# Patient Record
Sex: MALE | Race: WHITE | NOT HISPANIC OR LATINO | Employment: OTHER | URBAN - METROPOLITAN AREA
[De-identification: names, ages, dates, MRNs, and addresses within clinical notes are randomized per-mention and may not be internally consistent; named-entity substitution may affect disease eponyms.]

---

## 2018-10-11 ENCOUNTER — HOSPITAL ENCOUNTER (OUTPATIENT)
Dept: LAB | Facility: MEDICAL CENTER | Age: 83
End: 2018-10-11
Attending: FAMILY MEDICINE
Payer: MEDICARE

## 2018-10-11 LAB
ALBUMIN SERPL BCP-MCNC: 4.4 G/DL (ref 3.2–4.9)
ALBUMIN/GLOB SERPL: 1.9 G/DL
ALP SERPL-CCNC: 59 U/L (ref 30–99)
ALT SERPL-CCNC: 23 U/L (ref 2–50)
ANION GAP SERPL CALC-SCNC: 6 MMOL/L (ref 0–11.9)
APPEARANCE UR: CLEAR
AST SERPL-CCNC: 27 U/L (ref 12–45)
BACTERIA #/AREA URNS HPF: NEGATIVE /HPF
BASOPHILS # BLD AUTO: 0.5 % (ref 0–1.8)
BASOPHILS # BLD: 0.03 K/UL (ref 0–0.12)
BILIRUB SERPL-MCNC: 2.1 MG/DL (ref 0.1–1.5)
BILIRUB UR QL STRIP.AUTO: NEGATIVE
BNP SERPL-MCNC: 143 PG/ML (ref 0–100)
BUN SERPL-MCNC: 20 MG/DL (ref 8–22)
CALCIUM SERPL-MCNC: 9.2 MG/DL (ref 8.5–10.5)
CHLORIDE SERPL-SCNC: 101 MMOL/L (ref 96–112)
CHOLEST SERPL-MCNC: 148 MG/DL (ref 100–199)
CO2 SERPL-SCNC: 31 MMOL/L (ref 20–33)
COLOR UR: YELLOW
CREAT SERPL-MCNC: 0.96 MG/DL (ref 0.5–1.4)
EOSINOPHIL # BLD AUTO: 0.1 K/UL (ref 0–0.51)
EOSINOPHIL NFR BLD: 1.5 % (ref 0–6.9)
EPI CELLS #/AREA URNS HPF: NEGATIVE /HPF
ERYTHROCYTE [DISTWIDTH] IN BLOOD BY AUTOMATED COUNT: 49.1 FL (ref 35.9–50)
FASTING STATUS PATIENT QL REPORTED: NORMAL
GLOBULIN SER CALC-MCNC: 2.3 G/DL (ref 1.9–3.5)
GLUCOSE SERPL-MCNC: 115 MG/DL (ref 65–99)
GLUCOSE UR STRIP.AUTO-MCNC: NEGATIVE MG/DL
HCT VFR BLD AUTO: 45 % (ref 42–52)
HDLC SERPL-MCNC: 84 MG/DL
HGB BLD-MCNC: 14.6 G/DL (ref 14–18)
HYALINE CASTS #/AREA URNS LPF: ABNORMAL /LPF
IMM GRANULOCYTES # BLD AUTO: 0.07 K/UL (ref 0–0.11)
IMM GRANULOCYTES NFR BLD AUTO: 1.1 % (ref 0–0.9)
KETONES UR STRIP.AUTO-MCNC: NEGATIVE MG/DL
LDLC SERPL CALC-MCNC: 53 MG/DL
LEUKOCYTE ESTERASE UR QL STRIP.AUTO: NEGATIVE
LYMPHOCYTES # BLD AUTO: 1.4 K/UL (ref 1–4.8)
LYMPHOCYTES NFR BLD: 21.1 % (ref 22–41)
MCH RBC QN AUTO: 33.8 PG (ref 27–33)
MCHC RBC AUTO-ENTMCNC: 32.4 G/DL (ref 33.7–35.3)
MCV RBC AUTO: 104.2 FL (ref 81.4–97.8)
MICRO URNS: ABNORMAL
MONOCYTES # BLD AUTO: 0.7 K/UL (ref 0–0.85)
MONOCYTES NFR BLD AUTO: 10.5 % (ref 0–13.4)
NEUTROPHILS # BLD AUTO: 4.35 K/UL (ref 1.82–7.42)
NEUTROPHILS NFR BLD: 65.3 % (ref 44–72)
NITRITE UR QL STRIP.AUTO: NEGATIVE
NRBC # BLD AUTO: 0 K/UL
NRBC BLD-RTO: 0 /100 WBC
PH UR STRIP.AUTO: 7 [PH]
PLATELET # BLD AUTO: 147 K/UL (ref 164–446)
PMV BLD AUTO: 9.7 FL (ref 9–12.9)
POTASSIUM SERPL-SCNC: 4.1 MMOL/L (ref 3.6–5.5)
PROT SERPL-MCNC: 6.7 G/DL (ref 6–8.2)
PROT UR QL STRIP: NEGATIVE MG/DL
PSA SERPL-MCNC: 1.12 NG/ML (ref 0–4)
RBC # BLD AUTO: 4.32 M/UL (ref 4.7–6.1)
RBC # URNS HPF: ABNORMAL /HPF
RBC UR QL AUTO: ABNORMAL
SODIUM SERPL-SCNC: 138 MMOL/L (ref 135–145)
SP GR UR STRIP.AUTO: 1.02
T4 FREE SERPL-MCNC: 1.12 NG/DL (ref 0.53–1.43)
TRIGL SERPL-MCNC: 53 MG/DL (ref 0–149)
TSH SERPL DL<=0.005 MIU/L-ACNC: 0.86 UIU/ML (ref 0.38–5.33)
UROBILINOGEN UR STRIP.AUTO-MCNC: 0.2 MG/DL
VIT B12 SERPL-MCNC: 164 PG/ML (ref 211–911)
WBC # BLD AUTO: 6.7 K/UL (ref 4.8–10.8)
WBC #/AREA URNS HPF: ABNORMAL /HPF

## 2018-10-11 PROCEDURE — 83880 ASSAY OF NATRIURETIC PEPTIDE: CPT

## 2018-10-11 PROCEDURE — 84443 ASSAY THYROID STIM HORMONE: CPT

## 2018-10-11 PROCEDURE — 81001 URINALYSIS AUTO W/SCOPE: CPT

## 2018-10-11 PROCEDURE — 84153 ASSAY OF PSA TOTAL: CPT

## 2018-10-11 PROCEDURE — 82607 VITAMIN B-12: CPT

## 2018-10-11 PROCEDURE — 80053 COMPREHEN METABOLIC PANEL: CPT

## 2018-10-11 PROCEDURE — 84479 ASSAY OF THYROID (T3 OR T4): CPT

## 2018-10-11 PROCEDURE — 36415 COLL VENOUS BLD VENIPUNCTURE: CPT

## 2018-10-11 PROCEDURE — 80061 LIPID PANEL: CPT

## 2018-10-11 PROCEDURE — 84439 ASSAY OF FREE THYROXINE: CPT

## 2018-10-11 PROCEDURE — 85025 COMPLETE CBC W/AUTO DIFF WBC: CPT

## 2018-10-11 PROCEDURE — 84436 ASSAY OF TOTAL THYROXINE: CPT

## 2018-10-13 LAB
FT4I SERPL CALC-MCNC: 2.2 UNITS (ref 1.7–4.2)
T3RU NFR SERPL: 37 % (ref 28–41)
T4 SERPL-MCNC: 6.03 UG/DL (ref 5.1–14.1)

## 2018-10-20 ENCOUNTER — APPOINTMENT (OUTPATIENT)
Dept: CARDIOLOGY | Facility: MEDICAL CENTER | Age: 83
DRG: 192 | End: 2018-10-20
Attending: INTERNAL MEDICINE
Payer: MEDICARE

## 2018-10-20 ENCOUNTER — APPOINTMENT (OUTPATIENT)
Dept: RADIOLOGY | Facility: MEDICAL CENTER | Age: 83
DRG: 192 | End: 2018-10-20
Attending: EMERGENCY MEDICINE
Payer: MEDICARE

## 2018-10-20 ENCOUNTER — HOSPITAL ENCOUNTER (INPATIENT)
Facility: MEDICAL CENTER | Age: 83
LOS: 1 days | DRG: 192 | End: 2018-10-22
Attending: EMERGENCY MEDICINE | Admitting: INTERNAL MEDICINE
Payer: MEDICARE

## 2018-10-20 DIAGNOSIS — J44.1 CHRONIC OBSTRUCTIVE PULMONARY DISEASE WITH ACUTE EXACERBATION (HCC): ICD-10-CM

## 2018-10-20 DIAGNOSIS — R07.9 CHEST PAIN, UNSPECIFIED TYPE: ICD-10-CM

## 2018-10-20 DIAGNOSIS — R06.02 SHORTNESS OF BREATH: ICD-10-CM

## 2018-10-20 DIAGNOSIS — I50.9 CHRONIC CONGESTIVE HEART FAILURE, UNSPECIFIED HEART FAILURE TYPE (HCC): ICD-10-CM

## 2018-10-20 PROBLEM — R17 SERUM TOTAL BILIRUBIN ELEVATED: Status: ACTIVE | Noted: 2018-10-20

## 2018-10-20 PROBLEM — I10 HTN (HYPERTENSION): Status: ACTIVE | Noted: 2018-10-20

## 2018-10-20 LAB
ALBUMIN SERPL BCP-MCNC: 4.1 G/DL (ref 3.2–4.9)
ALBUMIN/GLOB SERPL: 1.8 G/DL
ALP SERPL-CCNC: 56 U/L (ref 30–99)
ALT SERPL-CCNC: 22 U/L (ref 2–50)
ANION GAP SERPL CALC-SCNC: 8 MMOL/L (ref 0–11.9)
AST SERPL-CCNC: 31 U/L (ref 12–45)
BASOPHILS # BLD AUTO: 0.3 % (ref 0–1.8)
BASOPHILS # BLD: 0.02 K/UL (ref 0–0.12)
BILIRUB SERPL-MCNC: 1.8 MG/DL (ref 0.1–1.5)
BNP SERPL-MCNC: 258 PG/ML (ref 0–100)
BUN SERPL-MCNC: 21 MG/DL (ref 8–22)
CALCIUM SERPL-MCNC: 8.7 MG/DL (ref 8.4–10.2)
CHLORIDE SERPL-SCNC: 102 MMOL/L (ref 96–112)
CO2 SERPL-SCNC: 26 MMOL/L (ref 20–33)
CREAT SERPL-MCNC: 0.91 MG/DL (ref 0.5–1.4)
EKG IMPRESSION: NORMAL
EOSINOPHIL # BLD AUTO: 0.09 K/UL (ref 0–0.51)
EOSINOPHIL NFR BLD: 1.3 % (ref 0–6.9)
ERYTHROCYTE [DISTWIDTH] IN BLOOD BY AUTOMATED COUNT: 47.8 FL (ref 35.9–50)
GLOBULIN SER CALC-MCNC: 2.3 G/DL (ref 1.9–3.5)
GLUCOSE SERPL-MCNC: 93 MG/DL (ref 65–99)
HCT VFR BLD AUTO: 42.9 % (ref 42–52)
HGB BLD-MCNC: 14.3 G/DL (ref 14–18)
IMM GRANULOCYTES # BLD AUTO: 0.05 K/UL (ref 0–0.11)
IMM GRANULOCYTES NFR BLD AUTO: 0.7 % (ref 0–0.9)
LV EJECT FRACT  99904: 45
LV EJECT FRACT MOD 2C 99903: 42.08
LV EJECT FRACT MOD 4C 99902: 67.95
LV EJECT FRACT MOD BP 99901: 55.12
LYMPHOCYTES # BLD AUTO: 1.42 K/UL (ref 1–4.8)
LYMPHOCYTES NFR BLD: 20.8 % (ref 22–41)
MAGNESIUM SERPL-MCNC: 2.1 MG/DL (ref 1.5–2.5)
MCH RBC QN AUTO: 34.3 PG (ref 27–33)
MCHC RBC AUTO-ENTMCNC: 33.3 G/DL (ref 33.7–35.3)
MCV RBC AUTO: 102.9 FL (ref 81.4–97.8)
MONOCYTES # BLD AUTO: 0.73 K/UL (ref 0–0.85)
MONOCYTES NFR BLD AUTO: 10.7 % (ref 0–13.4)
NEUTROPHILS # BLD AUTO: 4.53 K/UL (ref 1.82–7.42)
NEUTROPHILS NFR BLD: 66.2 % (ref 44–72)
NRBC # BLD AUTO: 0 K/UL
NRBC BLD-RTO: 0 /100 WBC
PHOSPHATE SERPL-MCNC: 4 MG/DL (ref 2.5–4.5)
PLATELET # BLD AUTO: 133 K/UL (ref 164–446)
PMV BLD AUTO: 9.1 FL (ref 9–12.9)
POTASSIUM SERPL-SCNC: 4.3 MMOL/L (ref 3.6–5.5)
PROCALCITONIN SERPL-MCNC: <0.05 NG/ML
PROT SERPL-MCNC: 6.4 G/DL (ref 6–8.2)
RBC # BLD AUTO: 4.17 M/UL (ref 4.7–6.1)
SODIUM SERPL-SCNC: 136 MMOL/L (ref 135–145)
TROPONIN I SERPL-MCNC: 0.02 NG/ML (ref 0–0.04)
WBC # BLD AUTO: 6.8 K/UL (ref 4.8–10.8)

## 2018-10-20 PROCEDURE — 80053 COMPREHEN METABOLIC PANEL: CPT

## 2018-10-20 PROCEDURE — 99220 PR INITIAL OBSERVATION CARE,LEVL III: CPT | Performed by: INTERNAL MEDICINE

## 2018-10-20 PROCEDURE — G0378 HOSPITAL OBSERVATION PER HR: HCPCS

## 2018-10-20 PROCEDURE — 700102 HCHG RX REV CODE 250 W/ 637 OVERRIDE(OP): Performed by: INTERNAL MEDICINE

## 2018-10-20 PROCEDURE — 93005 ELECTROCARDIOGRAM TRACING: CPT | Performed by: EMERGENCY MEDICINE

## 2018-10-20 PROCEDURE — 99285 EMERGENCY DEPT VISIT HI MDM: CPT

## 2018-10-20 PROCEDURE — 700102 HCHG RX REV CODE 250 W/ 637 OVERRIDE(OP): Performed by: EMERGENCY MEDICINE

## 2018-10-20 PROCEDURE — 71045 X-RAY EXAM CHEST 1 VIEW: CPT

## 2018-10-20 PROCEDURE — 700111 HCHG RX REV CODE 636 W/ 250 OVERRIDE (IP): Performed by: EMERGENCY MEDICINE

## 2018-10-20 PROCEDURE — 700101 HCHG RX REV CODE 250: Performed by: EMERGENCY MEDICINE

## 2018-10-20 PROCEDURE — 36415 COLL VENOUS BLD VENIPUNCTURE: CPT

## 2018-10-20 PROCEDURE — 700101 HCHG RX REV CODE 250: Performed by: INTERNAL MEDICINE

## 2018-10-20 PROCEDURE — 83735 ASSAY OF MAGNESIUM: CPT

## 2018-10-20 PROCEDURE — 84484 ASSAY OF TROPONIN QUANT: CPT

## 2018-10-20 PROCEDURE — 94760 N-INVAS EAR/PLS OXIMETRY 1: CPT

## 2018-10-20 PROCEDURE — 96372 THER/PROPH/DIAG INJ SC/IM: CPT

## 2018-10-20 PROCEDURE — 700111 HCHG RX REV CODE 636 W/ 250 OVERRIDE (IP): Performed by: INTERNAL MEDICINE

## 2018-10-20 PROCEDURE — 84100 ASSAY OF PHOSPHORUS: CPT

## 2018-10-20 PROCEDURE — 94640 AIRWAY INHALATION TREATMENT: CPT

## 2018-10-20 PROCEDURE — 85025 COMPLETE CBC W/AUTO DIFF WBC: CPT

## 2018-10-20 PROCEDURE — A9270 NON-COVERED ITEM OR SERVICE: HCPCS | Performed by: INTERNAL MEDICINE

## 2018-10-20 PROCEDURE — 96374 THER/PROPH/DIAG INJ IV PUSH: CPT

## 2018-10-20 PROCEDURE — 93306 TTE W/DOPPLER COMPLETE: CPT | Mod: 26 | Performed by: INTERNAL MEDICINE

## 2018-10-20 PROCEDURE — 93306 TTE W/DOPPLER COMPLETE: CPT

## 2018-10-20 PROCEDURE — 83880 ASSAY OF NATRIURETIC PEPTIDE: CPT

## 2018-10-20 PROCEDURE — A9270 NON-COVERED ITEM OR SERVICE: HCPCS | Performed by: EMERGENCY MEDICINE

## 2018-10-20 PROCEDURE — 84145 PROCALCITONIN (PCT): CPT

## 2018-10-20 RX ORDER — BUDESONIDE AND FORMOTEROL FUMARATE DIHYDRATE 80; 4.5 UG/1; UG/1
2 AEROSOL RESPIRATORY (INHALATION)
Status: DISCONTINUED | OUTPATIENT
Start: 2018-10-20 | End: 2018-10-22 | Stop reason: HOSPADM

## 2018-10-20 RX ORDER — FUROSEMIDE 20 MG/1
20 TABLET ORAL DAILY
Status: ON HOLD | COMMUNITY
End: 2018-10-22

## 2018-10-20 RX ORDER — BUDESONIDE AND FORMOTEROL FUMARATE DIHYDRATE 80; 4.5 UG/1; UG/1
2 AEROSOL RESPIRATORY (INHALATION) 2 TIMES DAILY
Status: DISCONTINUED | OUTPATIENT
Start: 2018-10-20 | End: 2018-10-20

## 2018-10-20 RX ORDER — HYDROCODONE BITARTRATE AND ACETAMINOPHEN 5; 325 MG/1; MG/1
1 TABLET ORAL EVERY 6 HOURS PRN
Status: DISCONTINUED | OUTPATIENT
Start: 2018-10-20 | End: 2018-10-22 | Stop reason: HOSPADM

## 2018-10-20 RX ORDER — FUROSEMIDE 10 MG/ML
40 INJECTION INTRAMUSCULAR; INTRAVENOUS ONCE
Status: COMPLETED | OUTPATIENT
Start: 2018-10-20 | End: 2018-10-20

## 2018-10-20 RX ORDER — LABETALOL HYDROCHLORIDE 5 MG/ML
10 INJECTION, SOLUTION INTRAVENOUS EVERY 4 HOURS PRN
Status: DISCONTINUED | OUTPATIENT
Start: 2018-10-20 | End: 2018-10-22 | Stop reason: HOSPADM

## 2018-10-20 RX ORDER — ONDANSETRON 4 MG/1
4 TABLET, ORALLY DISINTEGRATING ORAL EVERY 4 HOURS PRN
Status: DISCONTINUED | OUTPATIENT
Start: 2018-10-20 | End: 2018-10-22 | Stop reason: HOSPADM

## 2018-10-20 RX ORDER — POLYETHYLENE GLYCOL 3350 17 G/17G
1 POWDER, FOR SOLUTION ORAL
Status: DISCONTINUED | OUTPATIENT
Start: 2018-10-20 | End: 2018-10-22 | Stop reason: HOSPADM

## 2018-10-20 RX ORDER — DOXYCYCLINE 100 MG/1
100 TABLET ORAL EVERY 12 HOURS
Status: DISCONTINUED | OUTPATIENT
Start: 2018-10-20 | End: 2018-10-22 | Stop reason: HOSPADM

## 2018-10-20 RX ORDER — CARVEDILOL 6.25 MG/1
3.12 TABLET ORAL 2 TIMES DAILY WITH MEALS
Status: DISCONTINUED | OUTPATIENT
Start: 2018-10-20 | End: 2018-10-22 | Stop reason: HOSPADM

## 2018-10-20 RX ORDER — ONDANSETRON 2 MG/ML
4 INJECTION INTRAMUSCULAR; INTRAVENOUS EVERY 4 HOURS PRN
Status: DISCONTINUED | OUTPATIENT
Start: 2018-10-20 | End: 2018-10-22 | Stop reason: HOSPADM

## 2018-10-20 RX ORDER — IPRATROPIUM BROMIDE AND ALBUTEROL SULFATE 2.5; .5 MG/3ML; MG/3ML
3 SOLUTION RESPIRATORY (INHALATION)
Status: DISCONTINUED | OUTPATIENT
Start: 2018-10-20 | End: 2018-10-21

## 2018-10-20 RX ORDER — AMOXICILLIN 250 MG
2 CAPSULE ORAL 2 TIMES DAILY
Status: DISCONTINUED | OUTPATIENT
Start: 2018-10-20 | End: 2018-10-22 | Stop reason: HOSPADM

## 2018-10-20 RX ORDER — BISACODYL 10 MG
10 SUPPOSITORY, RECTAL RECTAL
Status: DISCONTINUED | OUTPATIENT
Start: 2018-10-20 | End: 2018-10-22 | Stop reason: HOSPADM

## 2018-10-20 RX ORDER — ALENDRONATE SODIUM 10 MG/1
10 TABLET ORAL
Status: DISCONTINUED | OUTPATIENT
Start: 2018-10-20 | End: 2018-10-22 | Stop reason: HOSPADM

## 2018-10-20 RX ORDER — ACETAMINOPHEN 325 MG/1
650 TABLET ORAL EVERY 6 HOURS PRN
Status: DISCONTINUED | OUTPATIENT
Start: 2018-10-20 | End: 2018-10-22 | Stop reason: HOSPADM

## 2018-10-20 RX ORDER — PREDNISONE 20 MG/1
40 TABLET ORAL DAILY
Status: DISCONTINUED | OUTPATIENT
Start: 2018-10-20 | End: 2018-10-22 | Stop reason: HOSPADM

## 2018-10-20 RX ORDER — ENALAPRIL MALEATE 5 MG/1
10 TABLET ORAL DAILY
Status: DISCONTINUED | OUTPATIENT
Start: 2018-10-20 | End: 2018-10-22 | Stop reason: HOSPADM

## 2018-10-20 RX ORDER — FUROSEMIDE 20 MG/1
20 TABLET ORAL
Status: DISCONTINUED | OUTPATIENT
Start: 2018-10-20 | End: 2018-10-22 | Stop reason: HOSPADM

## 2018-10-20 RX ORDER — POTASSIUM CHLORIDE 750 MG/1
20 TABLET, EXTENDED RELEASE ORAL DAILY
COMMUNITY
End: 2018-11-19 | Stop reason: SDUPTHER

## 2018-10-20 RX ORDER — ATORVASTATIN CALCIUM 10 MG/1
10 TABLET, FILM COATED ORAL
Status: DISCONTINUED | OUTPATIENT
Start: 2018-10-20 | End: 2018-10-22 | Stop reason: HOSPADM

## 2018-10-20 RX ORDER — ASPIRIN 81 MG/1
324 TABLET, CHEWABLE ORAL ONCE
Status: COMPLETED | OUTPATIENT
Start: 2018-10-20 | End: 2018-10-20

## 2018-10-20 RX ADMIN — FUROSEMIDE 40 MG: 10 INJECTION, SOLUTION INTRAMUSCULAR; INTRAVENOUS at 12:00

## 2018-10-20 RX ADMIN — ENALAPRIL MALEATE 10 MG: 5 TABLET ORAL at 14:10

## 2018-10-20 RX ADMIN — IPRATROPIUM BROMIDE AND ALBUTEROL SULFATE 3 ML: .5; 3 SOLUTION RESPIRATORY (INHALATION) at 18:35

## 2018-10-20 RX ADMIN — ASPIRIN 81 MG 324 MG: 81 TABLET ORAL at 11:26

## 2018-10-20 RX ADMIN — BUDESONIDE AND FORMOTEROL FUMARATE DIHYDRATE 2 PUFF: 80; 4.5 AEROSOL RESPIRATORY (INHALATION) at 18:35

## 2018-10-20 RX ADMIN — IPRATROPIUM BROMIDE AND ALBUTEROL SULFATE 3 ML: .5; 3 SOLUTION RESPIRATORY (INHALATION) at 22:31

## 2018-10-20 RX ADMIN — PREDNISONE 40 MG: 20 TABLET ORAL at 14:10

## 2018-10-20 RX ADMIN — IPRATROPIUM BROMIDE AND ALBUTEROL SULFATE 3 ML: .5; 3 SOLUTION RESPIRATORY (INHALATION) at 15:22

## 2018-10-20 RX ADMIN — FUROSEMIDE 20 MG: 20 TABLET ORAL at 14:10

## 2018-10-20 RX ADMIN — DOXYCYCLINE 100 MG: 100 TABLET, FILM COATED ORAL at 18:04

## 2018-10-20 RX ADMIN — ALBUTEROL SULFATE 2.5 MG: 2.5 SOLUTION RESPIRATORY (INHALATION) at 11:30

## 2018-10-20 RX ADMIN — ENOXAPARIN SODIUM 40 MG: 100 INJECTION SUBCUTANEOUS at 14:10

## 2018-10-20 RX ADMIN — ATORVASTATIN CALCIUM 10 MG: 10 TABLET, FILM COATED ORAL at 21:22

## 2018-10-20 RX ADMIN — ASPIRIN 81 MG: 81 TABLET, COATED ORAL at 14:10

## 2018-10-20 RX ADMIN — CARVEDILOL 3.12 MG: 6.25 TABLET, FILM COATED ORAL at 18:03

## 2018-10-20 ASSESSMENT — ENCOUNTER SYMPTOMS
MYALGIAS: 0
DEPRESSION: 0
HEADACHES: 0
COUGH: 0
SPUTUM PRODUCTION: 0
DIARRHEA: 0
LOSS OF CONSCIOUSNESS: 0
VOMITING: 0
ABDOMINAL PAIN: 0
SHORTNESS OF BREATH: 1
FEVER: 0
CHILLS: 0
DIZZINESS: 0
STRIDOR: 0
NAUSEA: 0
FALLS: 0
CONSTIPATION: 0
TINGLING: 0
PALPITATIONS: 0
WEAKNESS: 1

## 2018-10-20 ASSESSMENT — LIFESTYLE VARIABLES: EVER_SMOKED: YES

## 2018-10-20 ASSESSMENT — PATIENT HEALTH QUESTIONNAIRE - PHQ9
1. LITTLE INTEREST OR PLEASURE IN DOING THINGS: NOT AT ALL
SUM OF ALL RESPONSES TO PHQ9 QUESTIONS 1 AND 2: 0
1. LITTLE INTEREST OR PLEASURE IN DOING THINGS: NOT AT ALL
2. FEELING DOWN, DEPRESSED, IRRITABLE, OR HOPELESS: NOT AT ALL
2. FEELING DOWN, DEPRESSED, IRRITABLE, OR HOPELESS: NOT AT ALL
SUM OF ALL RESPONSES TO PHQ9 QUESTIONS 1 AND 2: 0

## 2018-10-20 ASSESSMENT — PAIN SCALES - GENERAL
PAINLEVEL_OUTOF10: 0
PAINLEVEL_OUTOF10: 0

## 2018-10-20 ASSESSMENT — COPD QUESTIONNAIRES
DO YOU EVER COUGH UP ANY MUCUS OR PHLEGM?: NO/ONLY WITH OCCASIONAL COLDS OR INFECTIONS
COPD SCREENING SCORE: 8
HAVE YOU SMOKED AT LEAST 100 CIGARETTES IN YOUR ENTIRE LIFE: YES
DURING THE PAST 4 WEEKS HOW MUCH DID YOU FEEL SHORT OF BREATH: MOST  OR ALL OF THE TIME

## 2018-10-20 NOTE — PROGRESS NOTES
2 RN skin assessment done; skin not WDL. Buttocks is red but blanches no open wounds.  Has some bruising to bilateral upper extremities.

## 2018-10-20 NOTE — ASSESSMENT & PLAN NOTE
Ejection fraction 35-40%  Edema improving, significant orthopnea.  Edema and orthopnea improving with increasing Lasix to twice daily.

## 2018-10-20 NOTE — FLOWSHEET NOTE
Nebulizer given in ER.  Aeration very diminished at bases.  Patient states his SOB is mostly with any exertion.     10/20/18 1130   RT Assessment of Delivered Medications   Evaluation of Medication Delivery Daily Yes-- Pt /Family has been Instructed in use of Respiratory Medications and Adverse Reactions   SVN Group   #SVN Performed Yes   Given By: Mouthpiece   Chest Exam   Work Of Breathing / Effort Mild   Respiration (!) 22   Pulse 71   Breath Sounds   RUL Breath Sounds Clear;Diminished   RML Breath Sounds Diminished   RLL Breath Sounds Diminished   CARROLL Breath Sounds Clear;Diminished   LLL Breath Sounds Diminished   Oxygen   Pulse Oximetry 95 %   O2 (LPM) 0   O2 Daily Delivery Respiratory  Room Air with O2 Available

## 2018-10-20 NOTE — ED PROVIDER NOTES
"ED Provider Note    CHIEF COMPLAINT  Chief Complaint   Patient presents with   • Shortness of Breath     Intermittent x2 mos.  Worsening past 1 week.  Hx CHF.  No peripheral edema noted.        HPI  Jhon Begum is a 89 y.o. male with a history of asthma and CHF who presents complaining of shortness of breath.    Patient states he has had difficulty lying flat for several months, intermittently.  In the last week his shortness of breath has become more generalized and he is dyspneic on exertion walking 20-30 feet at home.  He also reports a constant bandlike pressure across the anterior chest region.  This began last evening.  He denies back pain, fever, chills, sputum, leg swelling, calf pain, nausea, vomiting, diarrhea, diaphoresis.  Patient is not on home O2 therapy.  Patient does not take a daily aspirin.  Patient has not had any recent medication changes.      ALLERGIES  Allergies   Allergen Reactions   • Nkda [No Known Drug Allergy]        CURRENT MEDICATIONS  Symbicort, Lipitor, enalapril, carvedilol, Lasix, vitamin B12, Combivent, aspirin    PAST MEDICAL HISTORY   has a past medical history of ASTHMA; Congestive heart failure (HCC); and Pain.    SURGICAL HISTORY   has a past surgical history that includes other (1969); other (1969); hip arthroplasty total (3/31/08); and other abdominal surgery.    SOCIAL HISTORY  Social History     Social History Main Topics   • Smoking status: Former Smoker   • Smokeless tobacco: Never Used   • Alcohol use Yes      Comment: 1 DAILY   • Drug use: No   • Sexual activity: Not on file     Patient lives with his son here in [place]  Quit tobacco use 8 years ago    REVIEW OF SYSTEMS  See HPI for further details.  All other systems are negative except as above in HPI.    PHYSICAL EXAM  VITAL SIGNS: /89   Pulse 71   Temp 36.6 °C (97.8 °F)   Resp (!) 22   Ht 1.727 m (5' 8\")   Wt 63 kg (138 lb 14.2 oz)   SpO2 95%   BMI 21.12 kg/m²     General:  WDWN, nontoxic appearing, " slightly winded with speaking,; A+Ox3; V/S as above   Skin: warm and dry; good color; no rash  HEENT: NCAT; EOMs intact; no scleral icterus   Neck: FROM; no meningismus, no LAD; no JVD  Cardiovascular: Regular heart rate and rhythm.  No murmurs, rubs, or gallops; pulses 2+ bilaterally radially and DP areas  Chest wall: Barrel chested  Lungs: Clear to auscultation with decreased air movement bilaterally.  No wheezes, rhonchi, or rales.   Abdomen: BS present; soft; NTND; no rebound, guarding, or rigidity.  No organomegaly or pulsatile mass; no CVAT   Extremities: CARD x 4; no e/o trauma; no pedal edema; neg Serge's  Neurologic: CNs III-XII grossly intact; speech clear; distal sensation intact; strength 5/5 UE/LEs  Psychiatric: Appropriate affect, normal mood    LABS  Results for orders placed or performed during the hospital encounter of 10/20/18   CBC w/ Differential   Result Value Ref Range    WBC 6.8 4.8 - 10.8 K/uL    RBC 4.17 (L) 4.70 - 6.10 M/uL    Hemoglobin 14.3 14.0 - 18.0 g/dL    Hematocrit 42.9 42.0 - 52.0 %    .9 (H) 81.4 - 97.8 fL    MCH 34.3 (H) 27.0 - 33.0 pg    MCHC 33.3 (L) 33.7 - 35.3 g/dL    RDW 47.8 35.9 - 50.0 fL    Platelet Count 133 (L) 164 - 446 K/uL    MPV 9.1 9.0 - 12.9 fL    Neutrophils-Polys 66.20 44.00 - 72.00 %    Lymphocytes 20.80 (L) 22.00 - 41.00 %    Monocytes 10.70 0.00 - 13.40 %    Eosinophils 1.30 0.00 - 6.90 %    Basophils 0.30 0.00 - 1.80 %    Immature Granulocytes 0.70 0.00 - 0.90 %    Nucleated RBC 0.00 /100 WBC    Neutrophils (Absolute) 4.53 1.82 - 7.42 K/uL    Lymphs (Absolute) 1.42 1.00 - 4.80 K/uL    Monos (Absolute) 0.73 0.00 - 0.85 K/uL    Eos (Absolute) 0.09 0.00 - 0.51 K/uL    Baso (Absolute) 0.02 0.00 - 0.12 K/uL    Immature Granulocytes (abs) 0.05 0.00 - 0.11 K/uL    NRBC (Absolute) 0.00 K/uL   Complete Metabolic Panel (CMP)   Result Value Ref Range    Sodium 136 135 - 145 mmol/L    Potassium 4.3 3.6 - 5.5 mmol/L    Chloride 102 96 - 112 mmol/L    Co2 26 20 - 33  mmol/L    Anion Gap 8.0 0.0 - 11.9    Glucose 93 65 - 99 mg/dL    Bun 21 8 - 22 mg/dL    Creatinine 0.91 0.50 - 1.40 mg/dL    Calcium 8.7 8.4 - 10.2 mg/dL    AST(SGOT) 31 12 - 45 U/L    ALT(SGPT) 22 2 - 50 U/L    Alkaline Phosphatase 56 30 - 99 U/L    Total Bilirubin 1.8 (H) 0.1 - 1.5 mg/dL    Albumin 4.1 3.2 - 4.9 g/dL    Total Protein 6.4 6.0 - 8.2 g/dL    Globulin 2.3 1.9 - 3.5 g/dL    A-G Ratio 1.8 g/dL   Btype Natriuretic Peptide   Result Value Ref Range    B Natriuretic Peptide 258 (H) 0 - 100 pg/mL   Troponin STAT   Result Value Ref Range    Troponin I 0.02 0.00 - 0.04 ng/mL   Magnesium   Result Value Ref Range    Magnesium 2.1 1.5 - 2.5 mg/dL   Phosphorus   Result Value Ref Range    Phosphorus 4.0 2.5 - 4.5 mg/dL   ESTIMATED GFR   Result Value Ref Range    GFR If African American >60 >60 mL/min/1.73 m 2    GFR If Non African American >60 >60 mL/min/1.73 m 2   EKG   Result Value Ref Range    Report       [facility] Emergency Dept.    Test Date:  2018-10-20  Pt Name:    YANELI MEZA                   Department: [facility]  MRN:        [id number]                      [facility]   Gender:     Male                         Technician: [id number]  :        [id number]                   Requested By:MACKENZIE WATTS  Order #:    [id number]                    Reading MD: MACKENZIE WATTS MD    Measurements  Intervals                                Axis  Rate:       64                           P:          2  VA:         170                          QRS:        -57  QRSD:       82                           T:          -7  QT:         424  QTc:        438    Interpretive Statements  Sinus rhythm  Inferior infarct, old  Compared to ECG 2014 10:46:32  Left anterior fascicular block no longer present    Electronically Signed On 10- 11:18:20 PDT by MACKENZIE WATTS MD             IMAGING  DX-CHEST-PORTABLE (1 VIEW)   Final Result         Stable cardiomegaly.      Atherosclerotic plaque.       Sequelae of prior granulomatous exposure.      Mild interstitial prominence of the right lung base is unchanged.          MEDICAL RECORD  I have reviewed patient's medical record and pertinent results are listed below.      COURSE & MEDICAL DECISION MAKING  I have reviewed any medical record information, laboratory studies and radiographic results as noted.    Jhon Begum is a 89 y.o. male who presents complaining of shortness of breath.  Patient has a history of asthma, CHF, and was told he had emphysema at one point.  He is a former smoker.  I doubt PE.  I considered ACS.  Initial EKG demonstrated no ST elevation.    Aspirin and nebulizer given    Pt was re-evaluated at 11:46 AM  Lab results are noted.  BNP is slightly elevated.  Lasix 40 mg IV given.  No pulmonary edema noted on the chest x-ray.  Troponin is negative.  Paging hospitalist for admission.    12:10 PM  Dr. Dubose aware of pt needing admission      FINAL IMPRESSION  1. Shortness of breath    2. Chest pain, unspecified type        Electronically signed by: Julianna Godo, 10/20/2018 10:42 AM

## 2018-10-20 NOTE — PROGRESS NOTES
Patient admitted to the telemetry floor. Patient's admit profile completed.  2 RN skin assessment done see note.  Patient instructed to call RN if he needs assistance.  Bed alarm on.

## 2018-10-20 NOTE — H&P
Hospital Medicine History & Physical Note    Date of Service  10/20/2018    Primary Care Physician  Ihsan Monet M.D.    Consultants  None    Code Status  Full    Chief Complaint  Shortness of breath    History of Presenting Illness  89 y.o. male who presented 10/20/2018 with shortness of breath.  Patient states that his shortness of breath started months ago, became much worse last night.  Patient is a poor historian, some information obtained from the son at bedside.  His shortness of breath is all the time but much worse with exertion.  He states he cannot walk more than 25-30 feet.  He also states he wakes up at night short of breath, breathes very fast and very deep and this improves.  Patient does live with his brother, no history of snoring.  He does complain of chills.    Review of Systems  Review of Systems   Constitutional: Negative for chills, fever and malaise/fatigue.   HENT: Negative for congestion.    Respiratory: Positive for shortness of breath. Negative for cough, sputum production and stridor.    Cardiovascular: Negative for chest pain, palpitations and leg swelling.   Gastrointestinal: Negative for abdominal pain, constipation, diarrhea, nausea and vomiting.   Genitourinary: Negative for dysuria and urgency.   Musculoskeletal: Positive for joint pain. Negative for falls and myalgias.   Neurological: Positive for weakness. Negative for dizziness, tingling, loss of consciousness and headaches.   Psychiatric/Behavioral: Negative for depression and suicidal ideas.   All other systems reviewed and are negative.      Past Medical History   has a past medical history of ASTHMA; Congestive heart failure (HCC); and Pain.  Emphysema    Surgical History   has a past surgical history that includes other (1969); other (1969); hip arthroplasty total (3/31/08); and other abdominal surgery.     Family History  family history includes Cancer in his other.     Social History   reports that he has quit  smoking. He has never used smokeless tobacco. He reports that he drinks alcohol. He reports that he does not use drugs.    Allergies  Allergies   Allergen Reactions   • Nkda [No Known Drug Allergy]        Medications  Prior to Admission Medications   Prescriptions Last Dose Informant Patient Reported? Taking?   Non Formulary Request 8/11/2014 at Unknown  Yes No   Sig: Water pill, name unknown   POTASSIUM PO 8/11/2014 at Unknown  Yes No   Sig: Take  by mouth.   alendronate (FOSAMAX) 10 MG TABS   No No   Sig: Take 1 Tab by mouth every morning before breakfast.   atorvastatin (LIPITOR) 10 MG TABS   No No   Sig: Take 1 Tab by mouth every bedtime.   budesonide-formoterol (SYMBICORT) 80-4.5 MCG/ACT AERO   No No   Sig: Inhale 2 Puffs by mouth 2 Times a Day.   calcium-vitamin D (OSCAL-250) 250-125 MG-UNIT TABS   Yes No   Sig: Take 1 Tab by mouth every day.   carvedilol (COREG) 3.125 MG TABS   No No   Sig: Take 1 Tab by mouth 2 times a day, with meals.   cyanocobalamin (VITAMIN B12) 500 MCG tablet   Yes No   Sig: Take 1 Tab by mouth every day.   enalapril (VASOTEC) 10 MG TABS   No No   Sig: Take 1 Tab by mouth every day.   furosemide (LASIX) 20 MG TABS   No No   Sig: Take 0.5 Tabs by mouth 2 Times a Day.   hydrocodone-acetaminophen (NORCO) 5-325 MG TABS per tablet   No No   Sig: Take 1 Tab by mouth every 6 hours as needed.      Facility-Administered Medications: None       Physical Exam  Temp:  [36.6 °C (97.8 °F)] 36.6 °C (97.8 °F)  Pulse:  [71-72] 71  Resp:  [20-22] 22  BP: (151)/(89) 151/89    Physical Exam   Constitutional: He is oriented to person, place, and time.  Non-toxic appearance. No distress.   Thin and frail appearing    HENT:   Head: Normocephalic and atraumatic. Not macrocephalic and not microcephalic. Head is without raccoon's eyes and without Self's sign.   Mouth/Throat: No oropharyngeal exudate.   Eyes: Conjunctivae are normal. Right eye exhibits no discharge. Left eye exhibits no discharge. No scleral  icterus.   Neck: Normal range of motion. Neck supple. No tracheal deviation, no edema and no erythema present.   Cardiovascular: Normal rate, regular rhythm, normal heart sounds and intact distal pulses.  Exam reveals no gallop and no friction rub.    No murmur heard.  Pulmonary/Chest: Effort normal. No stridor. No respiratory distress. He has decreased breath sounds. He has no wheezes. He has no rales. He exhibits no tenderness.   Abdominal: Soft. Bowel sounds are normal. He exhibits no distension. There is no splenomegaly or hepatomegaly. There is no tenderness. There is no rebound and no guarding.   Musculoskeletal: Normal range of motion. He exhibits no edema, tenderness or deformity.   Lymphadenopathy:     He has no cervical adenopathy.   Neurological: He is alert and oriented to person, place, and time. No cranial nerve deficit. Coordination normal.   Skin: Skin is warm and dry. No rash noted. He is not diaphoretic. No cyanosis or erythema. No pallor. Nails show no clubbing.   Psychiatric: He has a normal mood and affect. His speech is normal and behavior is normal. Judgment and thought content normal. Cognition and memory are impaired.   Nursing note and vitals reviewed.      Laboratory:  Recent Labs      10/20/18   1042   WBC  6.8   RBC  4.17*   HEMOGLOBIN  14.3   HEMATOCRIT  42.9   MCV  102.9*   MCH  34.3*   MCHC  33.3*   RDW  47.8   PLATELETCT  133*   MPV  9.1     Recent Labs      10/20/18   1042   SODIUM  136   POTASSIUM  4.3   CHLORIDE  102   CO2  26   GLUCOSE  93   BUN  21   CREATININE  0.91   CALCIUM  8.7     Recent Labs      10/20/18   1042   ALTSGPT  22   ASTSGOT  31   ALKPHOSPHAT  56   TBILIRUBIN  1.8*   GLUCOSE  93         Recent Labs      10/20/18   1042   BNPBTYPENAT  258*         Recent Labs      10/20/18   1042   TROPONINI  0.02       Urinalysis:    No results found     Imaging:  DX-CHEST-PORTABLE (1 VIEW)   Final Result         Stable cardiomegaly.      Atherosclerotic plaque.      Sequelae of  prior granulomatous exposure.      Mild interstitial prominence of the right lung base is unchanged.      EC-ECHOCARDIOGRAM COMPLETE W/O CONT    (Results Pending)         Assessment/Plan:  I anticipate this patient is appropriate for observation status at this time.    Chronic obstructive pulmonary disease with acute exacerbation (HCC)- (present on admission)   Assessment & Plan    -Likely the diagnosis that is causing him the most shortness of breath, significantly decreased breath sounds  -Start respiratory care per protocol, give oral prednisone as well as doxycycline  -He does state its worse with ambulation, obtain PT/OT        Serum total bilirubin elevated   Assessment & Plan    -Chronic, asymptomatic        HTN (hypertension)   Assessment & Plan    -Continue Coreg and enalapril  -Place PRN labetalol and adjust as needed        CHF (congestive heart failure) (HCC)- (present on admission)   Assessment & Plan    -Most recent echocardiogram was 8/14, ejection fraction 35-40% grade 1 diastolic dysfunction  -Patient does have lower extremity edema, not significant fluid overload on x-ray  -Increase his Lasix from his baseline, oral  -Repeat echocardiogram            VTE prophylaxis: Lovenox

## 2018-10-20 NOTE — CARE PLAN
Problem: Bronchoconstriction:  Goal: Improve in air movement and diminished wheezing    Intervention: Evaluate and manage medication effects  Patient placed on respiratory protocol and Duoneb nebulizer Q 4 for COPD admission.  Patient complains of long time SOB, especially in the last few weeks.  He does not use inhalers regularly at home and no oxygen.  Breath sounds clear upper lobes and diminished throughout.  Room air oximetry is 95%.  Patient noted to get SOB with minimal exertion.  HR 78, RR 20-24. No coughing noted at this time.

## 2018-10-20 NOTE — ED NOTES
Med rec completed per rx bottles brought in by son at bedside. Rx bottles verified and son will be taking medications home.   Patient allergies have been reviewed: NKDA  Comments: Per pt he refuses to take 8 medications every day. Per son, pt takes medications every other day.

## 2018-10-20 NOTE — ED TRIAGE NOTES
Jhon Begum 89 y.o. male     Chief Complaint   Patient presents with   • Shortness of Breath     Intermittent x2 mos.  Worsening past 1 week.  Hx CHF.  No peripheral edema noted.         Pt returned to lobby and educated on triage process.  Advised to notify RN with changes or concerns.

## 2018-10-21 PROBLEM — R53.1 WEAKNESS: Status: ACTIVE | Noted: 2018-10-21

## 2018-10-21 PROBLEM — Z71.89 DO NOT RESUSCITATE DISCUSSION: Status: ACTIVE | Noted: 2018-10-21

## 2018-10-21 PROBLEM — R41.0 ACUTE DELIRIUM: Status: ACTIVE | Noted: 2018-10-21

## 2018-10-21 LAB
ANION GAP SERPL CALC-SCNC: 8 MMOL/L (ref 0–11.9)
BUN SERPL-MCNC: 26 MG/DL (ref 8–22)
CALCIUM SERPL-MCNC: 8.8 MG/DL (ref 8.4–10.2)
CHLORIDE SERPL-SCNC: 98 MMOL/L (ref 96–112)
CO2 SERPL-SCNC: 30 MMOL/L (ref 20–33)
CREAT SERPL-MCNC: 1.3 MG/DL (ref 0.5–1.4)
ERYTHROCYTE [DISTWIDTH] IN BLOOD BY AUTOMATED COUNT: 46.7 FL (ref 35.9–50)
GLUCOSE SERPL-MCNC: 127 MG/DL (ref 65–99)
HCT VFR BLD AUTO: 39.8 % (ref 42–52)
HGB BLD-MCNC: 13.4 G/DL (ref 14–18)
MCH RBC QN AUTO: 33.9 PG (ref 27–33)
MCHC RBC AUTO-ENTMCNC: 33.7 G/DL (ref 33.7–35.3)
MCV RBC AUTO: 100.8 FL (ref 81.4–97.8)
PLATELET # BLD AUTO: 137 K/UL (ref 164–446)
PMV BLD AUTO: 9.7 FL (ref 9–12.9)
POTASSIUM SERPL-SCNC: 3.8 MMOL/L (ref 3.6–5.5)
RBC # BLD AUTO: 3.95 M/UL (ref 4.7–6.1)
SODIUM SERPL-SCNC: 136 MMOL/L (ref 135–145)
WBC # BLD AUTO: 6.7 K/UL (ref 4.8–10.8)

## 2018-10-21 PROCEDURE — 94640 AIRWAY INHALATION TREATMENT: CPT

## 2018-10-21 PROCEDURE — 700111 HCHG RX REV CODE 636 W/ 250 OVERRIDE (IP): Performed by: INTERNAL MEDICINE

## 2018-10-21 PROCEDURE — 99233 SBSQ HOSP IP/OBS HIGH 50: CPT | Performed by: HOSPITALIST

## 2018-10-21 PROCEDURE — A9270 NON-COVERED ITEM OR SERVICE: HCPCS | Performed by: INTERNAL MEDICINE

## 2018-10-21 PROCEDURE — G8979 MOBILITY GOAL STATUS: HCPCS | Mod: CI

## 2018-10-21 PROCEDURE — 94760 N-INVAS EAR/PLS OXIMETRY 1: CPT

## 2018-10-21 PROCEDURE — 85027 COMPLETE CBC AUTOMATED: CPT

## 2018-10-21 PROCEDURE — 97161 PT EVAL LOW COMPLEX 20 MIN: CPT

## 2018-10-21 PROCEDURE — G8978 MOBILITY CURRENT STATUS: HCPCS | Mod: CJ

## 2018-10-21 PROCEDURE — 96372 THER/PROPH/DIAG INJ SC/IM: CPT

## 2018-10-21 PROCEDURE — 80048 BASIC METABOLIC PNL TOTAL CA: CPT

## 2018-10-21 PROCEDURE — 99497 ADVNCD CARE PLAN 30 MIN: CPT | Performed by: HOSPITALIST

## 2018-10-21 PROCEDURE — 700101 HCHG RX REV CODE 250: Performed by: HOSPITALIST

## 2018-10-21 PROCEDURE — 700102 HCHG RX REV CODE 250 W/ 637 OVERRIDE(OP): Performed by: INTERNAL MEDICINE

## 2018-10-21 PROCEDURE — 700101 HCHG RX REV CODE 250: Performed by: INTERNAL MEDICINE

## 2018-10-21 PROCEDURE — 770006 HCHG ROOM/CARE - MED/SURG/GYN SEMI*

## 2018-10-21 RX ORDER — IPRATROPIUM BROMIDE AND ALBUTEROL SULFATE 2.5; .5 MG/3ML; MG/3ML
3 SOLUTION RESPIRATORY (INHALATION)
Status: DISCONTINUED | OUTPATIENT
Start: 2018-10-21 | End: 2018-10-22

## 2018-10-21 RX ORDER — IPRATROPIUM BROMIDE AND ALBUTEROL SULFATE 2.5; .5 MG/3ML; MG/3ML
3 SOLUTION RESPIRATORY (INHALATION) 4 TIMES DAILY
Status: DISCONTINUED | OUTPATIENT
Start: 2018-10-21 | End: 2018-10-21

## 2018-10-21 RX ADMIN — ASPIRIN 81 MG: 81 TABLET, COATED ORAL at 05:45

## 2018-10-21 RX ADMIN — DOXYCYCLINE 100 MG: 100 TABLET, FILM COATED ORAL at 05:49

## 2018-10-21 RX ADMIN — ACETAMINOPHEN 650 MG: 325 TABLET, FILM COATED ORAL at 10:36

## 2018-10-21 RX ADMIN — IPRATROPIUM BROMIDE AND ALBUTEROL SULFATE 3 ML: .5; 3 SOLUTION RESPIRATORY (INHALATION) at 07:27

## 2018-10-21 RX ADMIN — FUROSEMIDE 20 MG: 20 TABLET ORAL at 05:44

## 2018-10-21 RX ADMIN — CARVEDILOL 3.12 MG: 6.25 TABLET, FILM COATED ORAL at 07:58

## 2018-10-21 RX ADMIN — ENOXAPARIN SODIUM 40 MG: 100 INJECTION SUBCUTANEOUS at 05:45

## 2018-10-21 RX ADMIN — IPRATROPIUM BROMIDE AND ALBUTEROL SULFATE 3 ML: .5; 3 SOLUTION RESPIRATORY (INHALATION) at 02:24

## 2018-10-21 RX ADMIN — PREDNISONE 40 MG: 20 TABLET ORAL at 05:44

## 2018-10-21 RX ADMIN — CARVEDILOL 3.12 MG: 6.25 TABLET, FILM COATED ORAL at 18:45

## 2018-10-21 RX ADMIN — ALENDRONATE SODIUM 10 MG: 10 TABLET ORAL at 07:59

## 2018-10-21 RX ADMIN — BUDESONIDE AND FORMOTEROL FUMARATE DIHYDRATE 2 PUFF: 80; 4.5 AEROSOL RESPIRATORY (INHALATION) at 19:55

## 2018-10-21 RX ADMIN — BUDESONIDE AND FORMOTEROL FUMARATE DIHYDRATE 2 PUFF: 80; 4.5 AEROSOL RESPIRATORY (INHALATION) at 07:27

## 2018-10-21 RX ADMIN — IPRATROPIUM BROMIDE AND ALBUTEROL SULFATE 3 ML: .5; 3 SOLUTION RESPIRATORY (INHALATION) at 19:55

## 2018-10-21 RX ADMIN — FUROSEMIDE 20 MG: 20 TABLET ORAL at 17:25

## 2018-10-21 RX ADMIN — ENALAPRIL MALEATE 10 MG: 5 TABLET ORAL at 05:44

## 2018-10-21 ASSESSMENT — PAIN SCALES - GENERAL
PAINLEVEL_OUTOF10: 0

## 2018-10-21 ASSESSMENT — GAIT ASSESSMENTS
ASSISTIVE DEVICE: FRONT WHEEL WALKER
GAIT LEVEL OF ASSIST: CONTACT GUARD ASSIST
DISTANCE (FEET): 100
DEVIATION: SHUFFLED GAIT;DECREASED BASE OF SUPPORT

## 2018-10-21 NOTE — THERAPY
"Physical Therapy Evaluation completed.   Bed Mobility:  Supine to Sit:  (NT, pt sitting EOB upon entry)  Transfers: Sit to Stand: Contact Guard Assist  Gait: Level Of Assist: Contact Guard Assist with Front-Wheel Walker       Plan of Care: Will benefit from Physical Therapy 3 times per week  Discharge Recommendations: Equipment: Tub transfer bench Post-acute therapy Discharge to a transitional care facility for continued skilled therapy services.    Pt is a 89 yo male with diagnosis of COPD exacerbation presenting with impaired balance and decreased endurance. Pt is a high fall risk with noted impulsivity and decreased safety awareness. Recommend continued PT at SNF prior to DC home, pts function has been slowly declining and could benefit from further balance and gait training prior to DC home.    See \"Rehab Therapy-Acute\" Patient Summary Report for complete documentation.     "

## 2018-10-21 NOTE — FLOWSHEET NOTE
10/21/18 0727   Interdisciplinary Plan of Care-Goals (Indications)   Bronchodilator Indications History / Diagnosis   Interdisciplinary Plan of Care-Outcomes    Bronchodilator Outcome Patient at Stable Baseline   Education   Education Yes - Pt. / Family has been Instructed in use of Respiratory Medications and Adverse Reactions   RT Assessment of Delivered Medications   Evaluation of Medication Delivery Daily Yes-- Pt /Family has been Instructed in use of Respiratory Medications and Adverse Reactions   SVN Group   #SVN Performed Yes   Given By: Mask   MDI/DPI Group   #MDI/DPI Given MDI/DPI x 1   Respiratory WDL   Respiratory (WDL) X   Chest Exam   Work Of Breathing / Effort Mild   Respiration 19   Pulse 91   Breath Sounds   Pre/Post Intervention Pre Intervention Assessment   RUL Breath Sounds Clear   RML Breath Sounds Diminished   RLL Breath Sounds Diminished   CARROLL Breath Sounds Clear   LLL Breath Sounds Diminished   Oximetry   #Pulse Oximetry (Single Determination) Yes   Continuous Oximetry Yes   Oxygen   Pulse Oximetry 93 %   O2 (LPM) 0   O2 Daily Delivery Respiratory  Room Air with O2 Available

## 2018-10-21 NOTE — PROGRESS NOTES
Dr. Garza paged and called back to unit.  Notified of patient's refusal to wear telemetry monitoring. MD aware, no new orders received.

## 2018-10-21 NOTE — PROGRESS NOTES
"Patient c/o mildly worsening SOB.  Patient states \"I can get the air in OK, but then it just disappears\".  O2 sat WNL, patient tachypneic with RR 24, HR 90's. Fine crackles noted in the bases with exp. Wheezes in LLL.  Patient states that at home, he sleeps in a recliner and cannot sleep lying down.  Patient was assisted up to cardiac chair by CNA in an effort to assist patient with relieving SOB.  RT notified, states has breathing treatment due for patient around 2300 and will administer treatment and assess patient at that time to determine if RT has any further recommendations.    "

## 2018-10-21 NOTE — PROGRESS NOTES
Dr. Kanwal Fermin paged to notified of patient's refusal to have IV access.  Awaiting return call.

## 2018-10-21 NOTE — PROGRESS NOTES
Bedside shift report received from Elbert Rowe RN.  Patient updated on POC for NOC shift.  Patient in agreement with POC.  Call bell and personal items within reach.  Non-skid socks in place.  Patient educated to use call bell for assistance.  Patient educated not to attempt to get OOB without assistance.  Bed alarm in use.

## 2018-10-21 NOTE — CARE PLAN
Problem: Safety  Goal: Will remain free from injury  Outcome: PROGRESSING SLOWER THAN EXPECTED  Patient is impulsive. Bed alarm on. Education reinforced  Goal: Will remain free from falls  Outcome: PROGRESSING SLOWER THAN EXPECTED      Problem: Respiratory:  Goal: Respiratory status will improve  Outcome: PROGRESSING AS EXPECTED  No SOB. On RA    Problem: Psychosocial Needs:  Goal: Level of anxiety will decrease  Outcome: PROGRESSING AS EXPECTED  Decreased stimuli. Call within reach

## 2018-10-21 NOTE — CARE PLAN
"Problem: Safety  Goal: Will remain free from falls  Outcome: PROGRESSING AS EXPECTED  Hourly rounding.  Ensure urinal at bedside for patient to use and empty during hourly rounds.  Fall prevention measures in place including use of non-skid socks and appropriate fall risk signs.  Bed and chair alarm in use.    Problem: Knowledge Deficit  Goal: Knowledge of disease process/condition, treatment plan, diagnostic tests, and medications will improve  Outcome: PROGRESSING SLOWER THAN EXPECTED  Reinforced education regarding COPD and CHF.  Patient states his SOB has been going on \"for several months\".  Patient endorses that he has orthopnea and is unable to sleep in bed at home and instead sleeps in a recliner chair.  Patient needs further reinforcement of s/s  Of worsening COPD/CHF to notify PCP to provide appropriate outpatient intervention and prevent hospitalizations.      "

## 2018-10-21 NOTE — FLOWSHEET NOTE
10/21/18 0200   Interdisciplinary Plan of Care-Goals (Indications)   Bronchodilator Indications History / Diagnosis   Interdisciplinary Plan of Care-Outcomes    Bronchodilator Outcome Patient at Stable Baseline   RT Assessment of Delivered Medications   Evaluation of Medication Delivery Daily Yes-- Pt /Family has been Instructed in use of Respiratory Medications and Adverse Reactions   SVN Group   #SVN Performed Yes   Given By: Mask   Respiratory WDL   Respiratory (WDL) X   Breath Sounds   RUL Breath Sounds Clear   RML Breath Sounds Diminished   RLL Breath Sounds Diminished;Fine Crackles   CARROLL Breath Sounds Clear   LLL Breath Sounds Diminished;Fine Crackles   Oximetry   #Pulse Oximetry (Single Determination) Yes   Oxygen   Pulse Oximetry 92 %   O2 (LPM) 0   O2 Daily Delivery Respiratory  Room Air with O2 Available

## 2018-10-21 NOTE — PROGRESS NOTES
Patient self-removed IV. IV catheter intact. Refusing to allow IV to be replaced. Patient continuing to refuse telemetry monitoring.

## 2018-10-21 NOTE — PROGRESS NOTES
Telemetry Shift Summary    Rhythm SR/ST  HR Range 80's-110's  Ectopy Rare PAC's, Pare PVC's  Measurements 0.16/0.08/0.36        Normal Values  Rhythm SR  HR Range    Measurements 0.12-0.20 / 0.06-0.10  / 0.30-0.52

## 2018-10-21 NOTE — PROGRESS NOTES
"Dr. PATTI Fermin called back to unit. Notified of patient's refusal of IV access and continued refusal of telemetry monitoring.  Requesting order for discontinuation of telemetry monitoring due to patient's refusal and also requesting if OK for patient to remain without IV access due to refusal to allow RN to replace IV.  MD states \"I'll be there soon.\"  No new orders received.  "

## 2018-10-21 NOTE — FLOWSHEET NOTE
Patient placed on 2 L nasal cannula.  He still complains of SOB, oximetry is okay on room air.     10/20/18 6685   Interdisciplinary Plan of Care-Goals (Indications)   Bronchodilator Indications History / Diagnosis   RT Assessment of Delivered Medications   Evaluation of Medication Delivery Daily Yes-- Pt /Family has been Instructed in use of Respiratory Medications and Adverse Reactions   SVN Group   #SVN Performed Yes   Given By: Mouthpiece   MDI/DPI Group   #MDI/DPI Given MDI/DPI x 1   Respiratory WDL   Respiratory (WDL) X   Chest Exam   Work Of Breathing / Effort Mild   Respiration (!) 22   Pulse (!) 106   Breath Sounds   RUL Breath Sounds Clear;Diminished   RML Breath Sounds Diminished   RLL Breath Sounds Diminished   CARROLL Breath Sounds Clear;Diminished   LLL Breath Sounds Diminished   Oximetry   #Pulse Oximetry (Single Determination) Yes   Oxygen   Home O2 Use Prior To Admission? No   Pulse Oximetry 91 %   O2 (LPM) 0   O2 Daily Delivery Respiratory  Room Air with O2 Available

## 2018-10-22 ENCOUNTER — PATIENT OUTREACH (OUTPATIENT)
Dept: HEALTH INFORMATION MANAGEMENT | Facility: OTHER | Age: 83
End: 2018-10-22

## 2018-10-22 VITALS
TEMPERATURE: 97.9 F | RESPIRATION RATE: 17 BRPM | DIASTOLIC BLOOD PRESSURE: 57 MMHG | OXYGEN SATURATION: 94 % | HEIGHT: 68 IN | HEART RATE: 67 BPM | WEIGHT: 138.89 LBS | SYSTOLIC BLOOD PRESSURE: 144 MMHG | BODY MASS INDEX: 21.05 KG/M2

## 2018-10-22 PROCEDURE — G8987 SELF CARE CURRENT STATUS: HCPCS | Mod: CI

## 2018-10-22 PROCEDURE — 94760 N-INVAS EAR/PLS OXIMETRY 1: CPT

## 2018-10-22 PROCEDURE — 700111 HCHG RX REV CODE 636 W/ 250 OVERRIDE (IP): Performed by: INTERNAL MEDICINE

## 2018-10-22 PROCEDURE — 97530 THERAPEUTIC ACTIVITIES: CPT

## 2018-10-22 PROCEDURE — 94640 AIRWAY INHALATION TREATMENT: CPT

## 2018-10-22 PROCEDURE — 700102 HCHG RX REV CODE 250 W/ 637 OVERRIDE(OP): Performed by: INTERNAL MEDICINE

## 2018-10-22 PROCEDURE — A9270 NON-COVERED ITEM OR SERVICE: HCPCS | Performed by: INTERNAL MEDICINE

## 2018-10-22 PROCEDURE — 700101 HCHG RX REV CODE 250: Performed by: HOSPITALIST

## 2018-10-22 PROCEDURE — G8988 SELF CARE GOAL STATUS: HCPCS | Mod: CH

## 2018-10-22 PROCEDURE — 99239 HOSP IP/OBS DSCHRG MGMT >30: CPT | Performed by: HOSPITALIST

## 2018-10-22 PROCEDURE — 97165 OT EVAL LOW COMPLEX 30 MIN: CPT

## 2018-10-22 RX ORDER — IPRATROPIUM BROMIDE AND ALBUTEROL SULFATE 2.5; .5 MG/3ML; MG/3ML
3 SOLUTION RESPIRATORY (INHALATION)
Status: DISCONTINUED | OUTPATIENT
Start: 2018-10-22 | End: 2018-10-22 | Stop reason: HOSPADM

## 2018-10-22 RX ORDER — FUROSEMIDE 20 MG/1
20 TABLET ORAL 2 TIMES DAILY
Qty: 60 TAB | Refills: 1 | Status: SHIPPED | OUTPATIENT
Start: 2018-10-22 | End: 2018-11-19 | Stop reason: SDUPTHER

## 2018-10-22 RX ADMIN — BUDESONIDE AND FORMOTEROL FUMARATE DIHYDRATE 2 PUFF: 80; 4.5 AEROSOL RESPIRATORY (INHALATION) at 07:34

## 2018-10-22 RX ADMIN — ALENDRONATE SODIUM 10 MG: 10 TABLET ORAL at 07:55

## 2018-10-22 RX ADMIN — PREDNISONE 40 MG: 20 TABLET ORAL at 07:56

## 2018-10-22 RX ADMIN — IPRATROPIUM BROMIDE AND ALBUTEROL SULFATE 3 ML: .5; 3 SOLUTION RESPIRATORY (INHALATION) at 07:34

## 2018-10-22 RX ADMIN — CARVEDILOL 3.12 MG: 6.25 TABLET, FILM COATED ORAL at 07:55

## 2018-10-22 RX ADMIN — ENALAPRIL MALEATE 10 MG: 5 TABLET ORAL at 07:56

## 2018-10-22 RX ADMIN — DOXYCYCLINE 100 MG: 100 TABLET, FILM COATED ORAL at 07:56

## 2018-10-22 RX ADMIN — FUROSEMIDE 20 MG: 20 TABLET ORAL at 07:56

## 2018-10-22 RX ADMIN — ASPIRIN 81 MG: 81 TABLET, COATED ORAL at 07:56

## 2018-10-22 ASSESSMENT — COGNITIVE AND FUNCTIONAL STATUS - GENERAL
DAILY ACTIVITIY SCORE: 23
HELP NEEDED FOR BATHING: A LITTLE
SUGGESTED CMS G CODE MODIFIER DAILY ACTIVITY: CI

## 2018-10-22 ASSESSMENT — PATIENT HEALTH QUESTIONNAIRE - PHQ9
SUM OF ALL RESPONSES TO PHQ9 QUESTIONS 1 AND 2: 0
2. FEELING DOWN, DEPRESSED, IRRITABLE, OR HOPELESS: NOT AT ALL
1. LITTLE INTEREST OR PLEASURE IN DOING THINGS: NOT AT ALL

## 2018-10-22 ASSESSMENT — GAIT ASSESSMENTS
DISTANCE (FEET): 100
DEVIATION: SHUFFLED GAIT;DECREASED BASE OF SUPPORT
GAIT LEVEL OF ASSIST: CONTACT GUARD ASSIST
ASSISTIVE DEVICE: FRONT WHEEL WALKER

## 2018-10-22 ASSESSMENT — PAIN SCALES - GENERAL: PAINLEVEL_OUTOF10: 0

## 2018-10-22 ASSESSMENT — ACTIVITIES OF DAILY LIVING (ADL): TOILETING: INDEPENDENT

## 2018-10-22 NOTE — THERAPY
"Occupational Therapy Evaluation completed.   Functional Status:  Pt sitting up in chair on arrival.  Pt was dressed in his own clothes, which were dirty.  Pt able to don his shoes & tie them with supervision.  Pt amb with FWW to bathroom with SBA.  Pt was supervised for toilet transfer using grab bar.  Pt able to manage clothing independently.  Pt has very poor hygiene skills.  Clothes visible soiled & smelly, pt unaware even after pointing it out.  Pt initially refused to wash hands after toileting, pt taken to sink & encouraged to wash hands which he did but refused to brush his teeth stating \"No one needs to brush more than once a day!\"  Pt appeared SOB after amb in [place] with FWW but his RA O2 sats were 92%.  Pt is impulsive at times & is definitely set in his ways.  Pt left sitting up in chair after tx.  Plan of Care: Will benefit from Occupational Therapy 3 times per week  Discharge Recommendations:  Equipment: Will Continue to Assess for Equipment Needs. Post-acute therapy Discharge to home with outpatient or home health for additional skilled therapy services.    Pt currently reports he lives with his son who is home 24/7.  Pt would benefit from Post Acuton D/C as he is a fall risk.e OT services, although not sure how receptive he would be to new learning.  Pt does need 24/7 supervision upon D/C.    See \"Rehab Therapy-Acute\" Patient Summary Report for complete documentation.    "

## 2018-10-22 NOTE — ASSESSMENT & PLAN NOTE
Suspect underlying dementia.  Sitter at bedside, frequent re orientation.  Minimize pain medication, treating underlying copd and heart failure.

## 2018-10-22 NOTE — FLOWSHEET NOTE
10/21/18 1955   Events/Summary/Plan   Events/Summary/Plan SVN MDI   Interdisciplinary Plan of Care-Goals (Indications)   Bronchodilator Indications History / Diagnosis   Interdisciplinary Plan of Care-Outcomes    Bronchodilator Outcome Patient at Stable Baseline   Education   Education Yes - Pt. / Family has been Instructed in use of Respiratory Equipment;Yes - Pt. / Family has been Instructed in use of Respiratory Medications and Adverse Reactions   RT Assessment of Delivered Medications   Evaluation of Medication Delivery Daily Yes-- Pt /Family has been Instructed in use of Respiratory Medications and Adverse Reactions   SVN Group   #SVN Performed Yes   Given By: Mask   MDI/DPI Group   #MDI/DPI Given MDI/DPI x 1   Respiratory WDL   Respiratory (WDL) X   Chest Exam   Respiration 18   Pulse 92   Breath Sounds   Pre/Post Intervention Post Intervention Assessment   RUL Breath Sounds Clear   RML Breath Sounds Diminished   RLL Breath Sounds Diminished   CARROLL Breath Sounds Clear   LLL Breath Sounds Diminished   Oximetry   #Pulse Oximetry (Single Determination) Yes   Oxygen   Pulse Oximetry 95 %   O2 Daily Delivery Respiratory  Room Air with O2 Available

## 2018-10-22 NOTE — PROGRESS NOTES
Bedside shift report received from Suyapa Ferraro RN.  Patient is confused and impulsive, oriented to self only.  Gets OOB frequently without assistance and does not use call bell.  Attempts to educate patient to use call bell before getting OOB unsuccessful, will continue to reinforce.  Patient reoriented but does not remain re-oriented.  Non-skid socks on.  Call bell and personal items within reach.  Bed alarm in use.  Patient visible from nurse's station.

## 2018-10-22 NOTE — DISCHARGE PLANNING
Received Choice form at 1093  Agency/Facility Name: [facility] (1) [facility] (2)  Referral sent per Choice form @ 4961

## 2018-10-22 NOTE — CARE PLAN
Problem: Safety  Goal: Will remain free from injury  Outcome: PROGRESSING AS EXPECTED  Bed in low locked position, call bell within reach. Shoes on

## 2018-10-22 NOTE — ASSESSMENT & PLAN NOTE
Due to advanced age, chronic heart failure and debility.  Physical and occupational therapy evaluation, likely recommend skilled treatment prior to going home.

## 2018-10-22 NOTE — ASSESSMENT & PLAN NOTE
Discussed with family, they do have an advanced directive.  Family all in agreement that he is DO NOT RESUSCITATE and DO NOT INTUBATE.  Discussed preferred intensity of care.  Recommend completion of POLST while in the hospital.  15 minutes of advanced care planning in addition to usual E&M time.

## 2018-10-22 NOTE — PROGRESS NOTES
Report received from Victoria MCGILL. Patient sitting up in chair. Has not taken am meds yet. Will f/u. Chair alarm on. Patient may need a sitter. Bed in low locked position, call bell within reach. Continue to monitor.

## 2018-10-22 NOTE — PROGRESS NOTES
Patient and family given discharge information. Verbalized understanding. Patient taken to lobby via wheelchair accompanied by RN.

## 2018-10-22 NOTE — FACE TO FACE
Face to Face Supporting Documentation - Home Health    The encounter with this patient was in whole or in part the primary reason for home health admission.    Date of encounter:   Patient:                    MRN:                       YOB: 2018  Jhon Begum  [id number]  6/20/1927     Home health to see patient for:  Skilled Nursing care for assessment, interventions & education, Home health aide, Physical Therapy evaluation and treatment and Occupational therapy evaluation and treatment    Skilled need for:  Exacerbation of Chronic Disease State heart failure    Skilled nursing interventions to include:  Comment: PT, OT, home aide, skilled nursing.    Homebound status evidenced by:  Needs the assistance of another person in order to leave the home. Leaving home requires a considerable and taxing effort. There is a normal inability to leave the home.    Community Physician to provide follow up care: Ihsan Moent M.D.     Optional Interventions? No      I certify the face to face encounter for this home health care referral meets the CMS requirements and the encounter/clinical assessment with the patient was, in whole, or in part, for the medical condition(s) listed above, which is the primary reason for home health care. Based on my clinical findings: the service(s) are medically necessary, support the need for home health care, and the homebound criteria are met.  I certify that this patient has had a face to face encounter by myself.  Kanwal Fermin M.D. - NPI: [id number]

## 2018-10-22 NOTE — DISCHARGE PLANNING
Agency/Facility Name: [facility]  Spoke To: Yecenia  Outcome: She is still reviewing, however, she will call this CCA back.

## 2018-10-22 NOTE — CARE PLAN
Problem: Safety  Goal: Will remain free from falls  Outcome: PROGRESSING SLOWER THAN EXPECTED  Not compliant with fall prevention measures.  Does not call appropriately before getting OOB.  Bed alarm on.  Non-skid socks on patient.  Call bell and personal belongings within reach.      Problem: Knowledge Deficit  Goal: Knowledge of the prescribed therapeutic regimen will improve  Outcome: PROGRESSING SLOWER THAN EXPECTED  Patient routinely refuses prescribed medications.  Refusing IV access and telemetry monitoring.  Patient educated regarding importance of taking medications as prescribed with no evidence of learning.

## 2018-10-22 NOTE — DISCHARGE INSTRUCTIONS
Discharge Instructions    Discharged to home by car with relative. Discharged via wheelchair, hospital escort: Yes.  Special equipment needed: Not Applicable    Be sure to schedule a follow-up appointment with your primary care doctor or any specialists as instructed.     Discharge Plan:   Pneumococcal Vaccine Administered/Refused: Not given - Patient refused pneumococcal vaccine  Influenza Vaccine Indication: Patient Refuses    I understand that a diet low in cholesterol, fat, and sodium is recommended for good health. Unless I have been given specific instructions below for another diet, I accept this instruction as my diet prescription.   Other diet: cardiac    Special Instructions:     HF Patient Discharge Instructions  · Monitor your weight daily, and maintain a weight chart, to track your weight changes.   · Activity as tolerated, unless your Doctor has ordered otherwise. Other activity order: .  · Follow a low fat, low cholesterol, low salt diet unless instructed otherwise by your Doctor. Read the labels on the back of food products and track your intake of fat, cholesterol and salt.   · Fluid Restriction No. If a Fluid Restriction has been ordered by your Doctor, measure fluids with a measuring cup to ensure that you are not exceeding the restriction.   · No smoking.  · Oxygen No. If your Doctor has ordered that you wear Oxygen at home, it is important to wear it as ordered.  · Did you receive an explanation from staff on the importance of taking each of your medications and why it is necessary to stay on the medications the physician/care provider has ordered? Yes  · Do you have any questions concerning how to manage your heart failure and what to do should you have any increased signs and symptoms after you go home? No  · Do you feel like your heart failure care team involved you in the care treatment plan and allowed you to make decisions regarding your care while in the hospital and addressed any  discharge needs you might have? No    See the educational handout provided at discharge for more information on monitoring your daily weight, activity and diet. This also explains more about Heart Failure, symptoms of a flare-up and some of the tests that you have undergone.     Warning Signs of a Flare-Up include:  · Swelling in the ankles or lower legs.  · Shortness of breath, while at rest, or while doing normal activities.   · Shortness of breath at night when in bed, or coughing in bed.   · Requiring more pillows to sleep at night, or needing to sit up at night to sleep.  · Feeling weak, dizzy or fatigued.     When to call your Doctor:  · Call [facility] about questions regarding the discharge instructions you were given [phone].  (Discharge Unit med tele 3rd floor)  · Call your Primary Care Physician or Cardiologist if:   1. You experience any pain radiating to your jaw or neck.  2. You have any difficulty breathing.  3. You experience weight gain of 2 lbs in a day or 5 lbs in a week.   4. You feel any palpitations or irregular heartbeats.  5. You become dizzy or lose consciousness.   If you have had an angiogram or had a pacemaker or AICD placed, and experience:  1. Bleeding, drainage or swelling at the surgical / puncture site.  2. Fever greater than 100.0 F  3. Shock from internal defibrillator.  4. Cool and / or numb extremities.      · Is patient discharged on Warfarin / Coumadin?   No     Depression / Suicide Risk    As you are discharged from this [facility], it is important to learn how to keep safe from harming yourself.    Recognize the warning signs:  · Abrupt changes in personality, positive or negative- including increase in energy   · Giving away possessions  · Change in eating patterns- significant weight changes-  positive or negative  · Change in sleeping patterns- unable to sleep or sleeping all the time   · Unwillingness or inability to  communicate  · Depression  · Unusual sadness, discouragement and loneliness  · Talk of wanting to die  · Neglect of personal appearance   · Rebelliousness- reckless behavior  · Withdrawal from people/activities they love  · Confusion- inability to concentrate     If you or a loved one observes any of these behaviors or has concerns about self-harm, here's what you can do:  · Talk about it- your feelings and reasons for harming yourself  · Remove any means that you might use to hurt yourself (examples: pills, rope, extension cords, firearm)  · Get professional help from the community (Mental Health, Substance Abuse, psychological counseling)  · Do not be alone:Call your Safe Contact- someone whom you trust who will be there for you.  · Call your local CRISIS HOTLINE 252-3879 or 598-664-8448  · Call your local Children's Mobile Crisis Response Team [place] (964) 728-9650 or www.Mbaobao  · Call the toll free National Suicide Prevention Hotlines   · National Suicide Prevention Lifeline 754-132-GOSG (0387)  · National Hope Line Network 800-SUICIDE (598-6567)

## 2018-10-22 NOTE — DISCHARGE PLANNING
Received Choice form at 1714  Agency/Facility Name: [facility]  Referral sent per Choice form @ 6574

## 2018-10-22 NOTE — PROGRESS NOTES
[facility] Medicine Daily Progress Note    Date of Service  10/21/2018    Chief Complaint  89 y.o. male admitted 10/20/2018 with shortness of breath, specifically orthopnea.    Hospital Course    89 y.o. male who presented 10/20/2018 with shortness of breath.  Patient states that his shortness of breath started months ago, became much worse last night.  Patient is a poor historian, some information obtained from the son at bedside.  His shortness of breath is all the time but much worse with exertion.  He states he cannot walk more than 25-30 feet.  He also states he wakes up at night short of breath, breathes very fast and very deep and this improves.  Patient lives with his son, no history of snoring.  He does complain of chills.  Family at bedside today gives more history that the patient has been having significant weakness and has been refusing to use a walker.      Interval Problem Update  Today he has been agitated trying to get out of bed and refusing to keep an IV in place or where telemetry.  Family is at bedside.  They are agreeable to him being DNR and not wearing telemetry or having an IV.  Son and daughter-in-law both are concerned about his level of function and ability to return safely to home.    Patient is able to state that he has significant shortness of breath that occurs when laying down and occurs later in the evening.  Consistent with paroxysmal nocturnal dyspnea.  The remainder of the review of systems is very difficult to obtain from him due to his confusion.    Consultants/Specialty  None    Code Status  DNAR/DNI    Disposition  Orders for transitional care assessment placed.    Review of Systems  Review of Systems   Unable to perform ROS: Acuity of condition        Physical Exam  Temp:  [36.4 °C (97.5 °F)-36.6 °C (97.9 °F)] 36.4 °C (97.5 °F)  Pulse:  [] 71  Resp:  [19-24] 20  BP: (114-140)/(61-95) 127/71    Physical Exam   Constitutional: He appears well-developed and well-nourished.  No distress.   Patient seen and examined by me.   HENT:   Nose: Nose normal.   Mouth/Throat: Oropharynx is clear and moist. No oropharyngeal exudate.   Eyes: Conjunctivae are normal. Right eye exhibits no discharge. Left eye exhibits no discharge. No scleral icterus.   Neck: No JVD present. No tracheal deviation present.   Cardiovascular: Normal rate, regular rhythm and normal heart sounds.    Pulmonary/Chest: Effort normal and breath sounds normal. No stridor. No respiratory distress. He has no wheezes. He has no rales. He exhibits no tenderness.   Abdominal: Soft. Bowel sounds are normal. He exhibits no distension. There is no tenderness.   Musculoskeletal: He exhibits no edema or tenderness.   Neurological: He is alert. No cranial nerve deficit. He exhibits normal muscle tone. Coordination normal.   Oriented x 2   Skin: Skin is warm and dry. He is not diaphoretic. No pallor.   Psychiatric: He has a normal mood and affect. His speech is normal. He is agitated. Cognition and memory are impaired. He expresses impulsivity. He exhibits abnormal recent memory and abnormal remote memory.   Nursing note and vitals reviewed.      Fluids    Intake/Output Summary (Last 24 hours) at 10/21/18 1830  Last data filed at 10/21/18 1244   Gross per 24 hour   Intake              600 ml   Output              200 ml   Net              400 ml       Laboratory  Recent Labs      10/20/18   1042  10/21/18   0448   WBC  6.8  6.7   RBC  4.17*  3.95*   HEMOGLOBIN  14.3  13.4*   HEMATOCRIT  42.9  39.8*   MCV  102.9*  100.8*   MCH  34.3*  33.9*   MCHC  33.3*  33.7   RDW  47.8  46.7   PLATELETCT  133*  137*   MPV  9.1  9.7     Recent Labs      10/20/18   1042  10/21/18   0448   SODIUM  136  136   POTASSIUM  4.3  3.8   CHLORIDE  102  98   CO2  26  30   GLUCOSE  93  127*   BUN  21  26*   CREATININE  0.91  1.30   CALCIUM  8.7  8.8         Recent Labs      10/20/18   1042   BNPBTYPENAT  258*           Imaging  EC-ECHOCARDIOGRAM COMPLETE W/O CONT    Final Result      DX-CHEST-PORTABLE (1 VIEW)   Final Result         Stable cardiomegaly.      Atherosclerotic plaque.      Sequelae of prior granulomatous exposure.      Mild interstitial prominence of the right lung base is unchanged.           Assessment/Plan  Chronic obstructive pulmonary disease with acute exacerbation (HCC)- (present on admission)   Assessment & Plan    Improved with oral prednisone and doxycycline.  Treating underlying heart failure as well.        Acute delirium- (present on admission)   Assessment & Plan    Suspect underlying dementia.  Sitter at bedside, frequent re orientation.  Minimize pain medication, treating underlying copd and heart failure.        Do not resuscitate discussion   Assessment & Plan    Discussed with family, they do have an advanced directive.  Family all in agreement that he is DO NOT RESUSCITATE and DO NOT INTUBATE.  Discussed preferred intensity of care.  Recommend completion of POLST while in the hospital.  15 minutes of advanced care planning in addition to usual E&M time.        Weakness- (present on admission)   Assessment & Plan    Due to advanced age, chronic heart failure and debility.  Physical and occupational therapy evaluation, likely recommend skilled treatment prior to going home.        Serum total bilirubin elevated- (present on admission)   Assessment & Plan    -Chronic, asymptomatic        HTN (hypertension)- (present on admission)   Assessment & Plan    Continue carvedilol and enalapril        CHF (congestive heart failure) (HCC)- (present on admission)   Assessment & Plan    Ejection fraction 35-40%  Edema improving, significant orthopnea.  Edema and orthopnea improving with increasing Lasix to twice daily.             VTE prophylaxis: SCD

## 2018-10-22 NOTE — DISCHARGE PLANNING
Care Transition Team Assessment    Called pt's son Moi [phone] to complete assessment and discuss discharge plan. Moi states pt currently lives with his brother Felipa. He states Felipa has been staying home to provide care for pt, however he states pt has declined a lot over the past few weeks and he believes pt would benefit from SNF stay prior to returning home with his son. Moi states PTA pt was requiring some assist with ADL's and brother takes care of IADL's. He states pt doesn't use any DME and usually navigates their small mobile home by holding on to furniture. Moi agreeable to SNF placement and states [facility] and [facility] would [facility]. Pt has been to [facility] in the past.   Choice form completed and faxed to [facility]    Information Source  Orientation : Disoriented to Event, Disoriented to Time         Elopement Risk  Legal Hold: No  Ambulatory or Self Mobile in Wheelchair: No-Not an Elopement Risk  Elopement Risk: Not at Risk for Elopement    Interdisciplinary Discharge Planning  Lives with - Patient's Self Care Capacity: Adult Children (son)  Housing / Facility: Mobile Home    Discharge Preparedness  What is your plan after discharge?: Skilled nursing facility  What are your discharge supports?: Child  Prior Functional Level: Needs Assist with Activities of Daily Living, Needs Assist with Medication Management  Difficulity with ADLs: Bathing, Dressing, Walking  Difficulity with IADLs: Cooking, Driving, Keeping track of finances, Laundry, Managing medication, Shopping    Functional Assesment  Prior Functional Level: Needs Assist with Activities of Daily Living, Needs Assist with Medication Management    Finances  Financial Barriers to Discharge: No  Prescription Coverage: Yes    Vision / Hearing Impairment  Hearing Impairment: Both Ears, Patient Declines to Wear Hearing Device(s)              Domestic Abuse  Have you ever been the victim of abuse or violence?:  No    Psychological Assessment  History of Substance Abuse: None  History of Psychiatric Problems: No  Non-compliant with Treatment: No  Newly Diagnosed Illness: No    Discharge Risks or Barriers  Discharge risks or barriers?: No    Anticipated Discharge Information  Anticipated discharge disposition: SNF

## 2018-10-22 NOTE — DISCHARGE SUMMARY
Discharge Summary    CHIEF COMPLAINT ON ADMISSION  Chief Complaint   Patient presents with   • Shortness of Breath     Intermittent x2 mos.  Worsening past 1 week.  Hx CHF.  No peripheral edema noted.        Reason for Admission  Shortness of Breath     Admission Date  10/20/2018    CODE STATUS  DNAR/DNI    HPI & HOSPITAL COURSE  This is a 89 y.o. male here with paroxysmal nocturnal dyspnea.     89 y.o. male who presented 10/20/2018 with shortness of breath.  Patient states that his shortness of breath started months ago, became much worse last night.  Patient is a poor historian, some information obtained from the son at bedside.  His shortness of breath is all the time but much worse with exertion.  He states he cannot walk more than 25-30 feet.  He also states he wakes up at night short of breath, breathes very fast and very deep and this improves.  Patient  lives with his son, no history of snoring.  He does complain of chills.  Family at bedside today gives more history that the patient has been having significant weakness and has been refusing to use a walker.   He had some wheezing on admission was treated with breathing treatments and a short course of steroids.  This improved readily.  He is definitely been having some pedal edema and paroxysmal nocturnal dyspnea.  I increased his Lasix dose with gradual improvement in his nighttime dyspnea.  He is tolerating Lasix 20 mg twice daily quite well.  Was originally thought that maybe he needed to go to skilled facility after discharge but after discussion with his family and the patient he would like to go home with home health care.  This is been ordered and implement it at discharge.    Therefore, he is discharged in fair and stable condition to home with organized home healthcare and close outpatient follow-up.    The patient met 2-midnight criteria for an inpatient stay at the time of discharge.    Discharge Date  10/22/18    FOLLOW UP ITEMS POST  DISCHARGE  Cardiology.  Home Health.    DISCHARGE DIAGNOSES  Active Problems:    Chronic obstructive pulmonary disease with acute exacerbation (HCC) POA: Yes    CHF (congestive heart failure) (HCC) POA: Yes    HTN (hypertension) POA: Yes    Serum total bilirubin elevated POA: Yes    Weakness POA: Yes    Do not resuscitate discussion POA: Unknown    Acute delirium POA: Yes  Resolved Problems:    * No resolved hospital problems. *      FOLLOW UP  Future Appointments  Date Time Provider Department Center   10/25/2018 10:20 AM Mayank Wilson M.D. [facility] None     No follow-up provider specified.    MEDICATIONS ON DISCHARGE     Medication List      CHANGE how you take these medications      Instructions   furosemide 20 MG Tabs  What changed:  when to take this  Commonly known as:  LASIX   Take 1 Tab by mouth 2 times a day.  Dose:  20 mg        CONTINUE taking these medications      Instructions   alendronate 10 MG Tabs  Commonly known as:  FOSAMAX   Take 1 Tab by mouth every morning before breakfast.  Dose:  10 mg     atorvastatin 10 MG Tabs  Commonly known as:  LIPITOR   Take 1 Tab by mouth every bedtime.  Dose:  10 mg     carvedilol 3.125 MG Tabs  Commonly known as:  COREG   Take 1 Tab by mouth 2 times a day, with meals.  Dose:  3.125 mg     enalapril 10 MG Tabs  Commonly known as:  VASOTEC   Take 1 Tab by mouth every day.  Dose:  10 mg     potassium chloride SA 10 MEQ Tbcr  Commonly known as:  K-DUR   Take 20 mEq by mouth every day.  Dose:  20 mEq            Allergies  Allergies   Allergen Reactions   • Nkda [No Known Drug Allergy]        DIET  Orders Placed This Encounter   Procedures   • Diet Order Cardiac     Standing Status:   Standing     Number of Occurrences:   1     Order Specific Question:   Diet:     Answer:   Cardiac [6]       ACTIVITY  As tolerated.  Weight bearing as tolerated    CONSULTATIONS  None.    PROCEDURES  None.    LABORATORY  Lab Results   Component Value Date    SODIUM 136 10/21/2018     POTASSIUM 3.8 10/21/2018    CHLORIDE 98 10/21/2018    CO2 30 10/21/2018    GLUCOSE 127 (H) 10/21/2018    BUN 26 (H) 10/21/2018    CREATININE 1.30 10/21/2018    CREATININE 1.2 03/25/2008        Lab Results   Component Value Date    WBC 6.7 10/21/2018    HEMOGLOBIN 13.4 (L) 10/21/2018    HEMATOCRIT 39.8 (L) 10/21/2018    PLATELETCT 137 (L) 10/21/2018        Total time of the discharge process exceeds 40 minutes.

## 2018-10-22 NOTE — FLOWSHEET NOTE
10/22/18 0735   Interdisciplinary Plan of Care-Goals (Indications)   Bronchodilator Indications History / Diagnosis   Interdisciplinary Plan of Care-Outcomes    Bronchodilator Outcome Patient at Stable Baseline   Education   Education Yes - Pt. / Family has been Instructed in use of Respiratory Medications and Adverse Reactions   RT Assessment of Delivered Medications   Evaluation of Medication Delivery Daily Yes-- Pt /Family has been Instructed in use of Respiratory Medications and Adverse Reactions   SVN Group   #SVN Performed Yes   Given By: Mask   MDI/DPI Group   #MDI/DPI Given MDI/DPI x 1   Respiratory WDL   Respiratory (WDL) X   Chest Exam   Work Of Breathing / Effort Mild   Respiration 17   Pulse 67   Breath Sounds   Pre/Post Intervention Pre Intervention Assessment   RUL Breath Sounds Clear   RML Breath Sounds Diminished   RLL Breath Sounds Diminished   CARROLL Breath Sounds Clear   LLL Breath Sounds Diminished   Oximetry   #Pulse Oximetry (Single Determination) Yes   Oxygen   Pulse Oximetry 93 %   O2 (LPM) 0   O2 Daily Delivery Respiratory  Room Air with O2 Available

## 2018-10-22 NOTE — DISCHARGE PLANNING
"Met with patient and son at the bedside for discharge plans, patient stated\" he will not discharge to SNF but will go home with Home Health\" and family and provider is in agreement. Choice was obtained and sent to [facility] for arrangement, CM to continue to follow for needs.    "

## 2018-10-22 NOTE — RESPIRATORY CARE
COPD EDUCATION by COPD CLINICAL EDUCATOR  10/22/2018 at 8:20 AM by Radha Tao     Patient reviewed by COPD education team. Patient does not qualify for COPD program.

## 2018-10-23 ENCOUNTER — PATIENT OUTREACH (OUTPATIENT)
Dept: HEALTH INFORMATION MANAGEMENT | Facility: OTHER | Age: 83
End: 2018-10-23

## 2018-10-25 ENCOUNTER — OFFICE VISIT (OUTPATIENT)
Dept: CARDIOLOGY | Facility: MEDICAL CENTER | Age: 83
End: 2018-10-25
Payer: MEDICARE

## 2018-10-25 ENCOUNTER — PATIENT OUTREACH (OUTPATIENT)
Dept: HEALTH INFORMATION MANAGEMENT | Facility: OTHER | Age: 83
End: 2018-10-25

## 2018-10-25 VITALS
WEIGHT: 133 LBS | OXYGEN SATURATION: 94 % | DIASTOLIC BLOOD PRESSURE: 72 MMHG | RESPIRATION RATE: 20 BRPM | BODY MASS INDEX: 20.23 KG/M2 | SYSTOLIC BLOOD PRESSURE: 149 MMHG | HEART RATE: 74 BPM

## 2018-10-25 DIAGNOSIS — I35.1 NONRHEUMATIC AORTIC VALVE INSUFFICIENCY: ICD-10-CM

## 2018-10-25 DIAGNOSIS — I50.22 CHRONIC SYSTOLIC HEART FAILURE (HCC): ICD-10-CM

## 2018-10-25 PROCEDURE — 99204 OFFICE O/P NEW MOD 45 MIN: CPT | Performed by: INTERNAL MEDICINE

## 2018-10-25 RX ORDER — DILTIAZEM HYDROCHLORIDE 60 MG/1
1 TABLET, FILM COATED ORAL 2 TIMES DAILY
COMMUNITY
Start: 2018-10-24 | End: 2019-04-22 | Stop reason: ALTCHOICE

## 2018-10-25 ASSESSMENT — ENCOUNTER SYMPTOMS
DIZZINESS: 0
WEIGHT LOSS: 0
INSOMNIA: 0
BRUISES/BLEEDS EASILY: 0
WHEEZING: 1
NERVOUS/ANXIOUS: 1
NAUSEA: 0
PALPITATIONS: 0
ORTHOPNEA: 1
DIARRHEA: 0
SENSORY CHANGE: 0
SHORTNESS OF BREATH: 1
FEVER: 0
VOMITING: 0
HEMOPTYSIS: 0
BACK PAIN: 0
BLURRED VISION: 1
FOCAL WEAKNESS: 0
BLOOD IN STOOL: 0

## 2018-10-25 NOTE — LETTER
"     [facility]   [address], [address]  [place] [address]  Phone: [phone]  Fax: [phone]              Jhon Begum  6/20/1927    Encounter Date: 10/25/2018    Mayank Wilson M.D.          PROGRESS NOTE:  Chief Complaint   Patient presents with   • Shortness of Breath     Hospital follow up at [facility].   Heart failure    Subjective:   Jhon Begum is a 89 y.o. male who presents today for evaluation of shortness of breath and management of heart failure    He is seen in consultation at the request of Dr. Monet for above issues.  He carries diagnosis of heart failure for sometimes. His LV systolic function has noted to be mildly reduced (45-50% or lower) at least since 2014.  He cardiac catheterization in 2014 showed minimal coronary artery disease. He has been on low dose carvedilol, furosemide and enalapril at least since then.  He also used to smoke for many years and has been diagnosed with COPD. He was prescribed Symbicort and other inhaler but did not think they helped much.    He was admitted to the hospital last week for worsening dyspnea. He denied chest pain, subjective fever, productive cough or recent flu like symptoms..   His furosemide was increased from 10 mg BID to 20 mg BID. He think he feel slightly better but remains quite limited and would get dyspnic with mild exertion.  His BP appears to run in the normal range or slightly high.  His O2 at times would drop in the 80s or lower with exertion.  He is currently being followed by Home Health nurse    Past Medical History:   Diagnosis Date   • ASTHMA    • Congestive heart failure (HCC)    • Emphysema lung (HCC) 2008   • Pain      Past Surgical History:   Procedure Laterality Date   • HIP ARTHROPLASTY TOTAL  3/31/08    Performed by WILLIE BURNHAM at [facility] ORS   • OTHER  1969    \"16\" twisted intestine removed\"   • OTHER  1969    \"injury to skull above right ear\"   • OTHER " ABDOMINAL SURGERY       Family History   Problem Relation Age of Onset   • Cancer Other      Social History     Social History   • Marital status: Single     Spouse name: N/A   • Number of children: N/A   • Years of education: N/A     Occupational History   • Not on file.     Social History Main Topics   • Smoking status: Former Smoker   • Smokeless tobacco: Never Used   • Alcohol use Yes      Comment: 1 DAILY   • Drug use: No   • Sexual activity: Not on file     Other Topics Concern   • Not on file     Social History Narrative   • No narrative on file     Allergies   Allergen Reactions   • Nkda [No Known Drug Allergy]      Outpatient Encounter Prescriptions as of 10/25/2018   Medication Sig Dispense Refill   • SYMBICORT 80-4.5 MCG/ACT Aerosol Inhale 1 Puff by mouth 2 Times a Day.     • furosemide (LASIX) 20 MG Tab Take 1 Tab by mouth 2 times a day. 60 Tab 1   • potassium chloride SA (K-DUR) 10 MEQ Tab CR Take 20 mEq by mouth every day.     • atorvastatin (LIPITOR) 10 MG TABS Take 1 Tab by mouth every bedtime. 30 Tab 11   • enalapril (VASOTEC) 10 MG TABS Take 1 Tab by mouth every day. 30 Tab 0   • carvedilol (COREG) 3.125 MG TABS Take 1 Tab by mouth 2 times a day, with meals. 60 Tab 0   • alendronate (FOSAMAX) 10 MG TABS Take 1 Tab by mouth every morning before breakfast. 30 Tab 0     No facility-administered encounter medications on file as of 10/25/2018.      Review of Systems   Constitutional: Positive for malaise/fatigue. Negative for fever and weight loss.   HENT: Negative for congestion and nosebleeds.    Eyes: Positive for blurred vision.   Respiratory: Positive for shortness of breath and wheezing. Negative for hemoptysis.    Cardiovascular: Positive for orthopnea and leg swelling. Negative for chest pain and palpitations.   Gastrointestinal: Negative for blood in stool, diarrhea, nausea and vomiting.   Musculoskeletal: Negative for back pain.   Skin: Negative for itching and rash.   Neurological: Negative  for dizziness, sensory change and focal weakness.   Endo/Heme/Allergies: Does not bruise/bleed easily.   Psychiatric/Behavioral: The patient is nervous/anxious. The patient does not have insomnia.    All other systems reviewed and are negative.       Objective:   /72 (BP Location: Left arm, Patient Position: Sitting, BP Cuff Size: Adult)   Pulse 74   Resp 20   Wt 60.3 kg (133 lb)   SpO2 94%   BMI 20.23 kg/m²      Physical Exam   Constitutional: He is oriented to person, place, and time. No distress.   HENT:   Head: Normocephalic and atraumatic.   Eyes: EOM are normal.   Neck: No tracheal deviation present. No thyromegaly present.   Cardiovascular: Normal rate and regular rhythm.  Exam reveals no gallop.    Murmur heard.   Systolic murmur is present with a grade of 1/6   Pulmonary/Chest: He has decreased breath sounds. He has no wheezes.   Crackles right base   Abdominal: Soft. He exhibits no distension.   Musculoskeletal: He exhibits edema. He exhibits no deformity.   Trace edema both ankles   Neurological: He is alert and oriented to person, place, and time.   Skin: Skin is warm. He is not diaphoretic. No erythema.   Psychiatric: His behavior is normal.     CXR 10/20 by my review showed significant congestion  Echo 10/20/18;  Mild concentric left ventricular hypertrophy.  Mildly reduced left ventricular systolic function.  Left ventricular ejection fraction is visually estimated to be 45%.  Indeterminate diastolic function.  Normal inferior vena cava size and inspiratory collapse.  Aortic sclerosis without stenosis.  Moderate aortic insufficiency.  Estimated right ventricular systolic pressure  is 50 mmHg.    Labs 10/20 CMP normal        Assessment:     1. Chronic systolic heart failure (HCC)     2. Nonrheumatic aortic valve insufficiency         Medical Decision Making:  Today's Assessment / Status / Plan:     I do feel that significant portion of his dyspnea is likely due to his COPD.  He is  here today with one of his sons. I had a lengthy discussion with the condition of his father. He does not have much sign of fluid retention. His lung exam showed very air movement. His BP is slightly high. Will try up titrate his enalapril to 10 mg BID.  His aortic regurgitation is in the moderate range at this time.  He has an appointment to follow up in out heart failure clinic in 2 weeks.  I advised him to keep that appointment. He will most likely be sen there regularly every few weeks. I also think that he may benefit from pulmonary follow up.  Will see him back here in a few months or sooner as needed.  Thank you for allowing me to participate in the caring of this patient.      No Recipients

## 2018-10-25 NOTE — PROGRESS NOTES
"Chief Complaint   Patient presents with   • Shortness of Breath     Hospital follow up at [facility].   Heart failure    Subjective:   Jhon Begum is a 89 y.o. male who presents today for evaluation of shortness of breath and management of heart failure    He is seen in consultation at the request of Dr. Monet for above issues.  He carries diagnosis of heart failure for sometimes. His LV systolic function has noted to be mildly reduced (45-50% or lower) at least since 2014.  He cardiac catheterization in 2014 showed minimal coronary artery disease. He has been on low dose carvedilol, furosemide and enalapril at least since then.  He also used to smoke for many years and has been diagnosed with COPD. He was prescribed Symbicort and other inhaler but did not think they helped much.    He was admitted to the hospital last week for worsening dyspnea. He denied chest pain, subjective fever, productive cough or recent flu like symptoms..   His furosemide was increased from 10 mg BID to 20 mg BID. He think he feel slightly better but remains quite limited and would get dyspnic with mild exertion.  His BP appears to run in the normal range or slightly high.  His O2 at times would drop in the 80s or lower with exertion.  He is currently being followed by Home Health nurse    Past Medical History:   Diagnosis Date   • ASTHMA    • Congestive heart failure (HCC)    • Emphysema lung (HCC) 2008   • Pain      Past Surgical History:   Procedure Laterality Date   • HIP ARTHROPLASTY TOTAL  3/31/08    Performed by WILLIE BURNHAM at SURGERY [facility] ORS   • OTHER  1969    \"16\" twisted intestine removed\"   • OTHER  1969    \"injury to skull above right ear\"   • OTHER ABDOMINAL SURGERY       Family History   Problem Relation Age of Onset   • Cancer Other      Social History     Social History   • Marital status: Single     Spouse name: N/A   • Number of children: N/A   • Years of education: N/A     Occupational History   • Not on " file.     Social History Main Topics   • Smoking status: Former Smoker   • Smokeless tobacco: Never Used   • Alcohol use Yes      Comment: 1 DAILY   • Drug use: No   • Sexual activity: Not on file     Other Topics Concern   • Not on file     Social History Narrative   • No narrative on file     Allergies   Allergen Reactions   • Nkda [No Known Drug Allergy]      Outpatient Encounter Prescriptions as of 10/25/2018   Medication Sig Dispense Refill   • SYMBICORT 80-4.5 MCG/ACT Aerosol Inhale 1 Puff by mouth 2 Times a Day.     • furosemide (LASIX) 20 MG Tab Take 1 Tab by mouth 2 times a day. 60 Tab 1   • potassium chloride SA (K-DUR) 10 MEQ Tab CR Take 20 mEq by mouth every day.     • atorvastatin (LIPITOR) 10 MG TABS Take 1 Tab by mouth every bedtime. 30 Tab 11   • enalapril (VASOTEC) 10 MG TABS Take 1 Tab by mouth every day. 30 Tab 0   • carvedilol (COREG) 3.125 MG TABS Take 1 Tab by mouth 2 times a day, with meals. 60 Tab 0   • alendronate (FOSAMAX) 10 MG TABS Take 1 Tab by mouth every morning before breakfast. 30 Tab 0     No facility-administered encounter medications on file as of 10/25/2018.      Review of Systems   Constitutional: Positive for malaise/fatigue. Negative for fever and weight loss.   HENT: Negative for congestion and nosebleeds.    Eyes: Positive for blurred vision.   Respiratory: Positive for shortness of breath and wheezing. Negative for hemoptysis.    Cardiovascular: Positive for orthopnea and leg swelling. Negative for chest pain and palpitations.   Gastrointestinal: Negative for blood in stool, diarrhea, nausea and vomiting.   Musculoskeletal: Negative for back pain.   Skin: Negative for itching and rash.   Neurological: Negative for dizziness, sensory change and focal weakness.   Endo/Heme/Allergies: Does not bruise/bleed easily.   Psychiatric/Behavioral: The patient is nervous/anxious. The patient does not have insomnia.    All other systems reviewed and are negative.       Objective:   BP  149/72 (BP Location: Left arm, Patient Position: Sitting, BP Cuff Size: Adult)   Pulse 74   Resp 20   Wt 60.3 kg (133 lb)   SpO2 94%   BMI 20.23 kg/m²     Physical Exam   Constitutional: He is oriented to person, place, and time. No distress.   HENT:   Head: Normocephalic and atraumatic.   Eyes: EOM are normal.   Neck: No tracheal deviation present. No thyromegaly present.   Cardiovascular: Normal rate and regular rhythm.  Exam reveals no gallop.    Murmur heard.   Systolic murmur is present with a grade of 1/6   Pulmonary/Chest: He has decreased breath sounds. He has no wheezes.   Crackles right base   Abdominal: Soft. He exhibits no distension.   Musculoskeletal: He exhibits edema. He exhibits no deformity.   Trace edema both ankles   Neurological: He is alert and oriented to person, place, and time.   Skin: Skin is warm. He is not diaphoretic. No erythema.   Psychiatric: His behavior is normal.     CXR 10/20 by my review showed significant congestion  Echo 10/20/18;  Mild concentric left ventricular hypertrophy.  Mildly reduced left ventricular systolic function.  Left ventricular ejection fraction is visually estimated to be 45%.  Indeterminate diastolic function.  Normal inferior vena cava size and inspiratory collapse.  Aortic sclerosis without stenosis.  Moderate aortic insufficiency.  Estimated right ventricular systolic pressure  is 50 mmHg.    Labs 10/20 CMP normal        Assessment:     1. Chronic systolic heart failure (HCC)     2. Nonrheumatic aortic valve insufficiency         Medical Decision Making:  Today's Assessment / Status / Plan:     I do feel that significant portion of his dyspnea is likely due to his COPD.  He is here today with one of his sons. I had a lengthy discussion with the condition of his father. He does not have much sign of fluid retention. His lung exam showed very air movement. His BP is slightly high. Will try up titrate his enalapril to 10 mg BID.  His aortic  regurgitation is in the moderate range at this time.  He has an appointment to follow up in out heart failure clinic in 2 weeks.  I advised him to keep that appointment. He will most likely be sen there regularly every few weeks. I also think that he may benefit from pulmonary follow up.  Will see him back here in a few months or sooner as needed.  Thank you for allowing me to participate in the caring of this patient.

## 2018-11-19 ENCOUNTER — OFFICE VISIT (OUTPATIENT)
Dept: CARDIOLOGY | Facility: MEDICAL CENTER | Age: 83
End: 2018-11-19
Payer: MEDICARE

## 2018-11-19 VITALS
HEART RATE: 82 BPM | BODY MASS INDEX: 20.46 KG/M2 | WEIGHT: 135 LBS | HEIGHT: 68 IN | OXYGEN SATURATION: 95 % | SYSTOLIC BLOOD PRESSURE: 130 MMHG | DIASTOLIC BLOOD PRESSURE: 76 MMHG

## 2018-11-19 DIAGNOSIS — J44.1 CHRONIC OBSTRUCTIVE PULMONARY DISEASE WITH ACUTE EXACERBATION (HCC): ICD-10-CM

## 2018-11-19 DIAGNOSIS — I50.9 HEART FAILURE, NYHA CLASS 3 (HCC): ICD-10-CM

## 2018-11-19 DIAGNOSIS — I25.10 CORONARY ARTERY DISEASE INVOLVING NATIVE CORONARY ARTERY OF NATIVE HEART WITHOUT ANGINA PECTORIS: ICD-10-CM

## 2018-11-19 DIAGNOSIS — I50.20 ACC/AHA STAGE C SYSTOLIC HEART FAILURE (HCC): ICD-10-CM

## 2018-11-19 PROCEDURE — 99214 OFFICE O/P EST MOD 30 MIN: CPT | Performed by: NURSE PRACTITIONER

## 2018-11-19 RX ORDER — FUROSEMIDE 20 MG/1
20 TABLET ORAL DAILY
Qty: 30 TAB | Refills: 11 | Status: SHIPPED | OUTPATIENT
Start: 2018-11-19 | End: 2019-05-22

## 2018-11-19 RX ORDER — ENALAPRIL MALEATE 10 MG/1
10 TABLET ORAL 2 TIMES DAILY
Qty: 60 TAB | Refills: 11 | Status: SHIPPED | OUTPATIENT
Start: 2018-11-19 | End: 2019-05-06 | Stop reason: SDUPTHER

## 2018-11-19 RX ORDER — POTASSIUM CHLORIDE 750 MG/1
20 TABLET, EXTENDED RELEASE ORAL DAILY
Qty: 60 TAB | Refills: 11 | Status: SHIPPED | OUTPATIENT
Start: 2018-11-19 | End: 2019-05-22

## 2018-11-19 RX ORDER — CARVEDILOL 6.25 MG/1
6.25 TABLET ORAL 2 TIMES DAILY WITH MEALS
Qty: 60 TAB | Refills: 11 | Status: SHIPPED | OUTPATIENT
Start: 2018-11-19 | End: 2019-05-22

## 2018-11-19 ASSESSMENT — ENCOUNTER SYMPTOMS
CLAUDICATION: 0
PALPITATIONS: 0
DIZZINESS: 0
PND: 0
COUGH: 0
ORTHOPNEA: 0
SHORTNESS OF BREATH: 1
ABDOMINAL PAIN: 0
MYALGIAS: 0
FEVER: 0

## 2018-11-19 ASSESSMENT — MINNESOTA LIVING WITH HEART FAILURE QUESTIONNAIRE (MLHF)
TIRED, FATIGUED OR LOW ON ENERGY: 5
FEELING LIKE A BURDEN TO FAMILY AND FRIENDS: 3
DIFFICULTY SLEEPING WELL AT NIGHT: 5
MAKING YOU STAY IN A HOSPITAL: 4
DIFFICULTY WITH RECREATIONAL PASTIMES, SPORTS, HOBBIES: 5
DIFFICULTY SOCIALIZING WITH FAMILY OR FRIENDS: 5
DIFFICULTY GOING AWAY FROM HOME: 5
LOSS OF SELF CONTROL IN YOUR LIFE: 5
MAKING YOU WORRY: 5
COSTING YOU MONEY FOR MEDICAL CARE: 4
DIFFICULTY TO CONCENTRATE OR REMEMBERING THINGS: 4
EATING LESS FOODS YOU LIKE: 3
DIFFICULTY WITH SEXUAL ACTIVITIES: 5
TOTAL_SCORE: 83
WALKING ABOUT OR CLIMBING STAIRS DIFFICULT: 5
MAKING YOU SHORT OF BREATH: 5
DIFFICULTY WORKING TO EARN A LIVING: 5
GIVING YOU SIDE EFFECTS FROM TREATMENTS: 1
SWELLING IN ANKLES OR LEGS: 0
WORKING AROUND THE HOUSE OR YARD DIFFICULT: 5
MAKING YOU FEEL DEPRESSED: 4
HAVING TO SIT OR LIE DOWN DURING THE DAY: 0

## 2018-11-19 ASSESSMENT — NEW YORK HEART ASSOCIATION (NYHA) CLASSIFICATION: NYHA FUNCTIONAL CLASS: CLASS III

## 2018-11-19 ASSESSMENT — 6 MINUTE WALK TEST (6MWT): TOTAL DISTANCE WALKED (METERS): 0

## 2018-11-19 NOTE — PROGRESS NOTES
"Chief Complaint   Patient presents with   • CHF (Acute)     HF New       Subjective:   Jhon Begum is a 89 y.o. male who presents today for follow-up on his heart failure with his son, Kushal    Patient of Dr. Wilson.  Patient was last seen in clinic on 10/25/2018.  During his last visit, his enalapril was increased to 10 mg twice a day.  Patient reports he has been taking the medication twice a day without difficulties.    Patient also reports she has been taking his diuretic once a day.  Patient does not want to take a second diuretic pill in the afternoon.    For his symptoms, he does have shortness of breath with ADLs and exertion.  He denies chest pain, palpitations, orthopnea, PND, edema or dizziness/lightheadedness.    Patient is not weighing himself at home.    Additonally, patient has the following medical problems:    -Emphysema: Taking Spiriva and albuterol    -Former smoker for over 60 years, smoked up to 2 packs/week.    Past Medical History:   Diagnosis Date   • ASTHMA    • Congestive heart failure (HCC)    • Emphysema lung (HCC) 2008   • Pain      Past Surgical History:   Procedure Laterality Date   • HIP ARTHROPLASTY TOTAL  3/31/08    Performed by WILLIE BURNHAM at SURGERY [facility]   • OTHER  1969    \"16\" twisted intestine removed\"   • OTHER  1969    \"injury to skull above right ear\"   • OTHER ABDOMINAL SURGERY       Family History   Problem Relation Age of Onset   • Cancer Other      Social History     Social History   • Marital status: Single     Spouse name: N/A   • Number of children: N/A   • Years of education: N/A     Occupational History   • Not on file.     Social History Main Topics   • Smoking status: Former Smoker   • Smokeless tobacco: Never Used   • Alcohol use Yes      Comment: 1 DAILY   • Drug use: No   • Sexual activity: Not on file     Other Topics Concern   • Not on file     Social History Narrative   • No narrative on file     Allergies   Allergen Reactions   • Nkda " "[No Known Drug Allergy]      Outpatient Encounter Prescriptions as of 11/19/2018   Medication Sig Dispense Refill   • SYMBICORT 80-4.5 MCG/ACT Aerosol Inhale 1 Puff by mouth 2 Times a Day.     • furosemide (LASIX) 20 MG Tab Take 1 Tab by mouth 2 times a day. 60 Tab 1   • potassium chloride SA (K-DUR) 10 MEQ Tab CR Take 20 mEq by mouth every day.     • atorvastatin (LIPITOR) 10 MG TABS Take 1 Tab by mouth every bedtime. 30 Tab 11   • enalapril (VASOTEC) 10 MG TABS Take 1 Tab by mouth every day. 30 Tab 0   • carvedilol (COREG) 3.125 MG TABS Take 1 Tab by mouth 2 times a day, with meals. 60 Tab 0   • alendronate (FOSAMAX) 10 MG TABS Take 1 Tab by mouth every morning before breakfast. 30 Tab 0     No facility-administered encounter medications on file as of 11/19/2018.      Review of Systems   Constitutional: Negative for fever and malaise/fatigue.   Respiratory: Positive for shortness of breath. Negative for cough.    Cardiovascular: Negative for chest pain, palpitations, orthopnea, claudication, leg swelling and PND.   Gastrointestinal: Negative for abdominal pain.   Musculoskeletal: Negative for myalgias.   Neurological: Negative for dizziness.   All other systems reviewed and are negative.       Objective:   /76 (BP Location: Left arm, Patient Position: Sitting, BP Cuff Size: Adult)   Pulse 82   Ht 1.727 m (5' 7.99\")   Wt 61.2 kg (135 lb)   SpO2 95%   BMI 20.53 kg/m²     Physical Exam   Constitutional: He is oriented to person, place, and time. He appears well-developed and well-nourished.   HENT:   Head: Normocephalic and atraumatic.   Eyes: Pupils are equal, round, and reactive to light. EOM are normal.   Neck: Normal range of motion. Neck supple. No JVD present.   Cardiovascular: Normal rate, regular rhythm and normal heart sounds.    Pulmonary/Chest: Effort normal and breath sounds normal. No respiratory distress. He has no wheezes. He has no rales.   Abdominal: Soft. Bowel sounds are normal. "   Musculoskeletal: He exhibits no edema.   Neurological: He is alert and oriented to person, place, and time.   Skin: Skin is warm and dry.   Psychiatric: He has a normal mood and affect. His behavior is normal.   Vitals reviewed.    Lab Results   Component Value Date/Time    CHOLSTRLTOT 148 10/11/2018 10:35 AM    LDL 53 10/11/2018 10:35 AM    HDL 84 10/11/2018 10:35 AM    TRIGLYCERIDE 53 10/11/2018 10:35 AM       Lab Results   Component Value Date/Time    SODIUM 136 10/21/2018 04:48 AM    POTASSIUM 3.8 10/21/2018 04:48 AM    CHLORIDE 98 10/21/2018 04:48 AM    CO2 30 10/21/2018 04:48 AM    GLUCOSE 127 (H) 10/21/2018 04:48 AM    BUN 26 (H) 10/21/2018 04:48 AM    CREATININE 1.30 10/21/2018 04:48 AM    CREATININE 1.2 03/25/2008 01:54 PM     Lab Results   Component Value Date/Time    ALKPHOSPHAT 56 10/20/2018 10:42 AM    ASTSGOT 31 10/20/2018 10:42 AM    ALTSGPT 22 10/20/2018 10:42 AM    TBILIRUBIN 1.8 (H) 10/20/2018 10:42 AM        Stress test 6/3/2014   IMPRESSIONS   1. Abnormal stress test   2. Reversible ischemia involving possibly proximal LAD with moderate photon    reduction. Partially reversible ischemia seen in RCA territory.   3. Global hypokinesis with akinetic to severely hypokinetic inferior wall.    LVEF 42%. Calculated TID 1.33    Echocardiogram 6/3/2014  CONCLUSIONS  Left ventricular ejection fraction is 45-50%.  Mildly reduced left ventricular systolic function.  Mild concentric left ventricular hypertrophy.  Normal right ventricular size and function.  Mild mitral regurgitation.  Aortic sclerosis without stenosis.  Mild eccentric aortic insufficiency.  Right ventricular systolic pressure is estimated to be 60-65 mmHg   consistent with moderate pulmonary hypertension.    Coronary angiogram 6/4/2014  IMPRESSION:  1.  Minimal coronary artery disease.  2.  Reduced left ventricular systolic function.  3.  Elevated left ventricular end-diastolic pressure.     RECOMMENDATIONS:  Recommend medical  therapy.    Echocardiogram 8/19/2014   CONCLUSIONS  Normal left ventricular chamber size. Normal left ventricular wall   thickness. Moderately reduced left ventricular systolic function. Left   ventricular ejection fraction is 35% to 40%. Grade I diastolic   dysfunction. Mitral E/A is  >1.0 and E/E' is abnormally high. Global   hypokinesis.  Aortic valve opens normally. Aortic sclerosis without stenosis. Mild   aortic insufficiency.   Mild tricuspid regurgitation. Right ventricular systolic pressure is   estimated to be 29-34 mmHg. Consistent with mild pulmonary   hypertension.    Transthoracic Echo Report 10/20/2018  Mild concentric left ventricular hypertrophy.  Mildly reduced left ventricular systolic function.  Left ventricular ejection fraction is visually estimated to be 45%.  Indeterminate diastolic function.  Normal inferior vena cava size and inspiratory collapse.  Aortic sclerosis without stenosis.  Moderate aortic insufficiency.  Estimated right ventricular systolic pressure  is 50 mmHg.    Assessment:     1. ACC/AHA stage C systolic heart failure (HCC)  carvedilol (COREG) 6.25 MG Tab    BASIC METABOLIC PANEL    furosemide (LASIX) 20 MG Tab    enalapril (VASOTEC) 10 MG Tab    potassium chloride SA (K-DUR) 10 MEQ Tab CR   2. Heart failure, NYHA class 3 (HCC)  carvedilol (COREG) 6.25 MG Tab    BASIC METABOLIC PANEL    furosemide (LASIX) 20 MG Tab    enalapril (VASOTEC) 10 MG Tab    potassium chloride SA (K-DUR) 10 MEQ Tab CR   3. Coronary artery disease involving native coronary artery of native heart without angina pectoris     4. Chronic obstructive pulmonary disease with acute exacerbation (HCC)         Medical Decision Making:  Today's Assessment / Status / Plan:   1. HFrEF, Stage C, Class 3, LVEF 45%: Based on physical examination findings, patient is euvolemic. No JVD, lungs are clear to auscultation, no pitting edema in bilateral lower extremities, no ascites.  -Continue decrease her furosemide to 20  mg daily  -Increase carvedilol to 6.25 mg twice a day  -Continue enalapril 10 mg twice a day  -Continue potassium 20 mEq daily  -Repeat BMP in 1 week  -Reinforced s/sx of worsening heart failure with patient and weight monitoring. Pt verbalizes understanding. Pt to call office or RTC if present.   -Patient to monitor weights at home daily. Pt to call office if weight increasing >3 lbs in 1 day or >5 lbs in 1 week.     2.  CAD: Last LDL 53 on 10/11/2018  -Continue atorvastatin 10 mg daily    3.  COPD:  -Encourage patient to follow-up for further management of his COPD/emphysema or refer to pulmonary  -Continue inhalers    FU in clinic in 4 weeks with HF MD. Sooner if needed.    Patient verbalizes understanding and agrees with the plan of care.     Collaborating MD: Juan Cifuentes MD

## 2018-11-19 NOTE — PROGRESS NOTES
"Education Narrative  Reviewed anatomy and physiology of heart failure with patient and son. Went over heart failure worksheet and patient's individual HF diagnosis, EF, risk factors, general medication classes and indications, as well as personal goals.  Goals: Patient's primary goal is: patient overwhelmed with everything, son appeared disinterested, playing on cell phone, patient stated he just wants to enjoy the rest of his time.      Discussed daily weights, sodium restriction, worsening signs and symptoms to report to physician, heart medications, and importance of adherence to medication regimen. Emphasized recommendation from AHA/AAHFN to keep daily sodium intake between 1500mg-2000mg.      Reviewed dietary handouts, advanced care planning classes, and advance directive planning handout. Discussed dietary considerations and reviewed Seven Day Heart Healthy Meal Plan by [employer].     Invited patient and family members/friends to HF support group and encouraged patient to call Heart Failure clinic during normal business hours with any questions.  Heart Failure program card with number given to patient.           Patient states full understanding of all information given.     OP Heart Failure  Vitals     Weight: 61.2 kg (135 lb)        Height: 172.7 cm (5' 7.99\")  BMI (Calculated): 20.53  Blood Pressure : 130/76  Pulse: 82    System Assessment  NYHA Functional Class Assessment: Class III  ACC/AHA HF Stage: C    Smoking Hx  Have you Ever Smoked: Yes  Have you Smoked in the Last 12 Mos: No     Confirm Quit Date: 01/01/10       Alcohol Hx  Do you Drink?: Yes                            Illicit Drug Hx  Illicit Drug History: No    Social Hx  Social History: Lives with Children  Level of Support: Good  Advance Directives: None    Education  Symptoms: Verbalizes understanding  Weighing: Verbalizes Understanding  Weight Gain Response: Verbalizes Understanding   Recording Data: Verbalizes understanding  Teach Back " Failures: Teach Back Successful  Compliance: Patient is Compliant       Medications  Medication Reconciliation : Complete  Medication Counseling Provided: Needs Reinforcement  Able to Accurately Identify Medication Indications: None  Medication Discrepancies: None  Is Patient on an Evidence Based Beta Blocker: Yes  Is Patient on ACE-1 or ARB: Yes    Dietary Assessment  Food Labels: Verbalizes Understanding  Foods High in Sodium: Verbalizes Understanding  Daily Sodium Intake: Verbalizes Understanding  Diet: Verbalizes Understanding  Food Preparation: Verbalizes Understanding  Eating Out Plan: Verbalizes understanding  Healthier Options: Verbalizes Understanding  Fluid Restriction: Not Applicable    MN Living with Heart Failure  Swelling in Ankles or Legs: 0  Having to Sit or Lie Down During the Day: 0  Walking About or Climbing Stairs Difficult: 5  Working Around the House or Yard Difficult: 5  Difficulty Going Away from Home: 5  Difficulty Sleeping Well at Night: 5  Difficulty Socializing with Family or Friends: 5  Difficulty Working to Earn a Livin  Difficulty with Recreational Pastimes, Sports, Hobbies: 5  Difficulty with Sexual Activities: 5  Eating Less Foods You Like: 3  Making you Short of Breath: 5  Tired, Fatigued or Low on Energy: 5  Making you Stay in a Hospital: 4  Costing you Money for Medical Care?: 4  Giving you Side Effects from Treatments: 1  Feeling like a [place] to Family and Friends: 3  Loss of Self Control in your Life: 5  Making You Worry: 5  Difficulty to Concentrate or Remembering Things: 4  Making you Feel Depressed: 4  MLHF Total Score : 83    6 Minute Walk Test  Baseline to end of test: 0:00  Total meters walked: 0  Comments: wheelchair bound-did not walk

## 2018-11-21 ENCOUNTER — PATIENT OUTREACH (OUTPATIENT)
Dept: HEALTH INFORMATION MANAGEMENT | Facility: OTHER | Age: 83
End: 2018-11-21

## 2018-11-28 NOTE — PROGRESS NOTES
Jhon Begum was admitted to [facility] on 10/20/18 for a COPD exacerbation, and was discharged on 10/22/18. [facility] patient advocate was able to successfully engage with patient post-discharge. Per discharge orders, patient had a follow-up appointment with Dr. Wilson (cardiology) on 10/25/18 and with Kylie RODRIGUEZ (cardiology) on 11/19/18. Patient was also discharged with home health services through [facility] for skilled nursing, physical therapy, occupational therapy and a home health aide. Patient declined to follow-up with PCP post-discharge. Patient has a two future appointments with Cardiology. PPS screening was conducted at 70%.

## 2019-01-03 ENCOUNTER — TELEPHONE (OUTPATIENT)
Dept: CARDIOLOGY | Facility: MEDICAL CENTER | Age: 84
End: 2019-01-03

## 2019-01-03 NOTE — TELEPHONE ENCOUNTER
"S/w patient about non-fasting labs, Patient was very confused and stated to \"Skip over the appt, I'm fine.\" Patient thought that Dr. Goldberg was the eye doctor. Patient asked when the appt was and I explained to the patient that it is 01/08/2019, patient then stated \"Ok, for Dr. Prasad\", I explained to the patient that this appt is not with Dr. Prasad, its with Dr. Goldberg. Patient sounded confused even more and stated \" I don't know anything about Dr. Goldberg. Who is he? I don't need this appt.\"     I asked the patient if he would like to transferred to scheduling to re-schedule appt and patient stated \"Sure.\"     I transferred him to ext #[phone]  "

## 2019-03-07 ENCOUNTER — HOSPITAL ENCOUNTER (INPATIENT)
Facility: MEDICAL CENTER | Age: 84
LOS: 4 days | DRG: 183 | End: 2019-03-11
Attending: EMERGENCY MEDICINE | Admitting: INTERNAL MEDICINE
Payer: MEDICARE

## 2019-03-07 ENCOUNTER — APPOINTMENT (OUTPATIENT)
Dept: RADIOLOGY | Facility: MEDICAL CENTER | Age: 84
DRG: 183 | End: 2019-03-07
Attending: EMERGENCY MEDICINE
Payer: MEDICARE

## 2019-03-07 DIAGNOSIS — S50.01XA CONTUSION OF RIGHT ELBOW, INITIAL ENCOUNTER: ICD-10-CM

## 2019-03-07 DIAGNOSIS — S22.41XA CLOSED FRACTURE OF MULTIPLE RIBS OF RIGHT SIDE, INITIAL ENCOUNTER: ICD-10-CM
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
## 2019-03-07 PROBLEM — J96.01 ACUTE RESPIRATORY FAILURE WITH HYPOXIA (HCC): Status: ACTIVE | Noted: 2019-03-07

## 2019-03-07 PROBLEM — D72.829 LEUKOCYTOSIS: Status: ACTIVE | Noted: 2019-03-07

## 2019-03-07 PROBLEM — S22.49XA RIB FRACTURES: Status: ACTIVE | Noted: 2019-03-07

## 2019-03-07 PROBLEM — D69.6 THROMBOCYTOPENIA (HCC): Status: ACTIVE | Noted: 2019-03-07

## 2019-03-07 PROBLEM — J96.00 ACUTE RESPIRATORY FAILURE (HCC): Status: ACTIVE | Noted: 2019-03-07

## 2019-03-07 LAB
ALBUMIN SERPL BCP-MCNC: 3.8 G/DL (ref 3.2–4.9)
ALBUMIN/GLOB SERPL: 1.5 G/DL
ALP SERPL-CCNC: 61 U/L (ref 30–99)
ALT SERPL-CCNC: 26 U/L (ref 2–50)
ANION GAP SERPL CALC-SCNC: 9 MMOL/L (ref 0–11.9)
APTT PPP: 23.6 SEC (ref 24.7–36)
AST SERPL-CCNC: 37 U/L (ref 12–45)
BASOPHILS # BLD AUTO: 0.2 % (ref 0–1.8)
BASOPHILS # BLD: 0.03 K/UL (ref 0–0.12)
BILIRUB SERPL-MCNC: 2.2 MG/DL (ref 0.1–1.5)
BUN SERPL-MCNC: 33 MG/DL (ref 8–22)
CALCIUM SERPL-MCNC: 8.9 MG/DL (ref 8.4–10.2)
CHLORIDE SERPL-SCNC: 99 MMOL/L (ref 96–112)
CO2 SERPL-SCNC: 27 MMOL/L (ref 20–33)
CREAT SERPL-MCNC: 1.08 MG/DL (ref 0.5–1.4)
EOSINOPHIL # BLD AUTO: 0.06 K/UL (ref 0–0.51)
EOSINOPHIL NFR BLD: 0.5 % (ref 0–6.9)
ERYTHROCYTE [DISTWIDTH] IN BLOOD BY AUTOMATED COUNT: 50.4 FL (ref 35.9–50)
GLOBULIN SER CALC-MCNC: 2.6 G/DL (ref 1.9–3.5)
GLUCOSE SERPL-MCNC: 122 MG/DL (ref 65–99)
HCT VFR BLD AUTO: 40.2 % (ref 42–52)
HGB BLD-MCNC: 12.9 G/DL (ref 14–18)
IMM GRANULOCYTES # BLD AUTO: 0.2 K/UL (ref 0–0.11)
IMM GRANULOCYTES NFR BLD AUTO: 1.6 % (ref 0–0.9)
INR PPP: 1.09 (ref 0.87–1.13)
LYMPHOCYTES # BLD AUTO: 1.39 K/UL (ref 1–4.8)
LYMPHOCYTES NFR BLD: 11 % (ref 22–41)
MCH RBC QN AUTO: 33.2 PG (ref 27–33)
MCHC RBC AUTO-ENTMCNC: 32.1 G/DL (ref 33.7–35.3)
MCV RBC AUTO: 103.6 FL (ref 81.4–97.8)
MONOCYTES # BLD AUTO: 1.36 K/UL (ref 0–0.85)
MONOCYTES NFR BLD AUTO: 10.7 % (ref 0–13.4)
NEUTROPHILS # BLD AUTO: 9.63 K/UL (ref 1.82–7.42)
NEUTROPHILS NFR BLD: 76 % (ref 44–72)
NRBC # BLD AUTO: 0 K/UL
NRBC BLD-RTO: 0 /100 WBC
PLATELET # BLD AUTO: 98 K/UL (ref 164–446)
PMV BLD AUTO: 9.9 FL (ref 9–12.9)
POTASSIUM SERPL-SCNC: 4.3 MMOL/L (ref 3.6–5.5)
PROT SERPL-MCNC: 6.4 G/DL (ref 6–8.2)
PROTHROMBIN TIME: 14 SEC (ref 12–14.6)
RBC # BLD AUTO: 3.88 M/UL (ref 4.7–6.1)
SODIUM SERPL-SCNC: 135 MMOL/L (ref 135–145)
WBC # BLD AUTO: 12.7 K/UL (ref 4.8–10.8)

## 2019-03-07 PROCEDURE — 36415 COLL VENOUS BLD VENIPUNCTURE: CPT

## 2019-03-07 PROCEDURE — 90715 TDAP VACCINE 7 YRS/> IM: CPT | Performed by: EMERGENCY MEDICINE

## 2019-03-07 PROCEDURE — 80053 COMPREHEN METABOLIC PANEL: CPT

## 2019-03-07 PROCEDURE — 85730 THROMBOPLASTIN TIME PARTIAL: CPT

## 2019-03-07 PROCEDURE — 90471 IMMUNIZATION ADMIN: CPT

## 2019-03-07 PROCEDURE — 85610 PROTHROMBIN TIME: CPT

## 2019-03-07 PROCEDURE — 700111 HCHG RX REV CODE 636 W/ 250 OVERRIDE (IP): Performed by: EMERGENCY MEDICINE

## 2019-03-07 PROCEDURE — 99222 1ST HOSP IP/OBS MODERATE 55: CPT | Mod: AI | Performed by: INTERNAL MEDICINE

## 2019-03-07 PROCEDURE — A9270 NON-COVERED ITEM OR SERVICE: HCPCS | Performed by: INTERNAL MEDICINE

## 2019-03-07 PROCEDURE — 71250 CT THORAX DX C-: CPT

## 2019-03-07 PROCEDURE — 700102 HCHG RX REV CODE 250 W/ 637 OVERRIDE(OP): Performed by: INTERNAL MEDICINE

## 2019-03-07 PROCEDURE — 770006 HCHG ROOM/CARE - MED/SURG/GYN SEMI*

## 2019-03-07 PROCEDURE — 94760 N-INVAS EAR/PLS OXIMETRY 1: CPT

## 2019-03-07 PROCEDURE — 3E0234Z INTRODUCTION OF SERUM, TOXOID AND VACCINE INTO MUSCLE, PERCUTANEOUS APPROACH: ICD-10-PCS | Performed by: EMERGENCY MEDICINE

## 2019-03-07 PROCEDURE — 85025 COMPLETE CBC W/AUTO DIFF WBC: CPT

## 2019-03-07 PROCEDURE — 99285 EMERGENCY DEPT VISIT HI MDM: CPT

## 2019-03-07 PROCEDURE — 73080 X-RAY EXAM OF ELBOW: CPT | Mod: RT

## 2019-03-07 RX ORDER — CARVEDILOL 6.25 MG/1
6.25 TABLET ORAL 2 TIMES DAILY WITH MEALS
Status: DISCONTINUED | OUTPATIENT
Start: 2019-03-07 | End: 2019-03-11 | Stop reason: HOSPADM

## 2019-03-07 RX ORDER — BISACODYL 10 MG
10 SUPPOSITORY, RECTAL RECTAL
Status: DISCONTINUED | OUTPATIENT
Start: 2019-03-07 | End: 2019-03-11 | Stop reason: HOSPADM

## 2019-03-07 RX ORDER — POLYETHYLENE GLYCOL 3350 17 G/17G
1 POWDER, FOR SOLUTION ORAL
Status: DISCONTINUED | OUTPATIENT
Start: 2019-03-07 | End: 2019-03-11 | Stop reason: HOSPADM

## 2019-03-07 RX ORDER — ALENDRONATE SODIUM 10 MG/1
10 TABLET ORAL
Status: DISCONTINUED | OUTPATIENT
Start: 2019-03-08 | End: 2019-03-11 | Stop reason: HOSPADM

## 2019-03-07 RX ORDER — ONDANSETRON 2 MG/ML
4 INJECTION INTRAMUSCULAR; INTRAVENOUS EVERY 4 HOURS PRN
Status: DISCONTINUED | OUTPATIENT
Start: 2019-03-07 | End: 2019-03-11 | Stop reason: HOSPADM

## 2019-03-07 RX ORDER — OXYCODONE HYDROCHLORIDE 5 MG/1
2.5 TABLET ORAL
Status: DISCONTINUED | OUTPATIENT
Start: 2019-03-07 | End: 2019-03-11 | Stop reason: HOSPADM

## 2019-03-07 RX ORDER — FUROSEMIDE 40 MG/1
20 TABLET ORAL DAILY
Status: DISCONTINUED | OUTPATIENT
Start: 2019-03-08 | End: 2019-03-11 | Stop reason: HOSPADM

## 2019-03-07 RX ORDER — ENALAPRILAT 1.25 MG/ML
1.25 INJECTION INTRAVENOUS EVERY 6 HOURS PRN
Status: DISCONTINUED | OUTPATIENT
Start: 2019-03-07 | End: 2019-03-11 | Stop reason: HOSPADM

## 2019-03-07 RX ORDER — BUDESONIDE AND FORMOTEROL FUMARATE DIHYDRATE 80; 4.5 UG/1; UG/1
1 AEROSOL RESPIRATORY (INHALATION) 2 TIMES DAILY
Status: DISCONTINUED | OUTPATIENT
Start: 2019-03-07 | End: 2019-03-11 | Stop reason: HOSPADM

## 2019-03-07 RX ORDER — ATORVASTATIN CALCIUM 10 MG/1
10 TABLET, FILM COATED ORAL
Status: DISCONTINUED | OUTPATIENT
Start: 2019-03-07 | End: 2019-03-11 | Stop reason: HOSPADM

## 2019-03-07 RX ORDER — AMOXICILLIN 250 MG
2 CAPSULE ORAL 2 TIMES DAILY
Status: DISCONTINUED | OUTPATIENT
Start: 2019-03-07 | End: 2019-03-11 | Stop reason: HOSPADM

## 2019-03-07 RX ORDER — POTASSIUM CHLORIDE 20 MEQ/1
20 TABLET, EXTENDED RELEASE ORAL DAILY
Status: DISCONTINUED | OUTPATIENT
Start: 2019-03-08 | End: 2019-03-11 | Stop reason: HOSPADM

## 2019-03-07 RX ORDER — ACETAMINOPHEN 325 MG/1
650 TABLET ORAL EVERY 6 HOURS PRN
Status: DISCONTINUED | OUTPATIENT
Start: 2019-03-07 | End: 2019-03-11 | Stop reason: HOSPADM

## 2019-03-07 RX ORDER — ENALAPRIL MALEATE 5 MG/1
10 TABLET ORAL 2 TIMES DAILY
Status: DISCONTINUED | OUTPATIENT
Start: 2019-03-07 | End: 2019-03-11 | Stop reason: HOSPADM

## 2019-03-07 RX ORDER — OXYCODONE HYDROCHLORIDE 5 MG/1
5 TABLET ORAL
Status: DISCONTINUED | OUTPATIENT
Start: 2019-03-07 | End: 2019-03-11 | Stop reason: HOSPADM

## 2019-03-07 RX ORDER — ONDANSETRON 4 MG/1
4 TABLET, ORALLY DISINTEGRATING ORAL EVERY 4 HOURS PRN
Status: DISCONTINUED | OUTPATIENT
Start: 2019-03-07 | End: 2019-03-11 | Stop reason: HOSPADM

## 2019-03-07 RX ADMIN — CARVEDILOL 6.25 MG: 6.25 TABLET, FILM COATED ORAL at 21:44

## 2019-03-07 RX ADMIN — ACETAMINOPHEN 650 MG: 325 TABLET, FILM COATED ORAL at 21:26

## 2019-03-07 RX ADMIN — ATORVASTATIN CALCIUM 10 MG: 10 TABLET, FILM COATED ORAL at 21:44

## 2019-03-07 RX ADMIN — STANDARDIZED SENNA CONCENTRATE AND DOCUSATE SODIUM 2 TABLET: 8.6; 5 TABLET, FILM COATED ORAL at 21:26

## 2019-03-07 RX ADMIN — CLOSTRIDIUM TETANI TOXOID ANTIGEN (FORMALDEHYDE INACTIVATED), CORYNEBACTERIUM DIPHTHERIAE TOXOID ANTIGEN (FORMALDEHYDE INACTIVATED), BORDETELLA PERTUSSIS TOXOID ANTIGEN (GLUTARALDEHYDE INACTIVATED), BORDETELLA PERTUSSIS FILAMENTOUS HEMAGGLUTININ ANTIGEN (FORMALDEHYDE INACTIVATED), BORDETELLA PERTUSSIS PERTACTIN ANTIGEN, AND BORDETELLA PERTUSSIS FIMBRIAE 2/3 ANTIGEN 0.5 ML: 5; 2; 2.5; 5; 3; 5 INJECTION, SUSPENSION INTRAMUSCULAR at 18:59

## 2019-03-07 RX ADMIN — ENALAPRIL MALEATE 10 MG: 5 TABLET ORAL at 21:44

## 2019-03-07 RX ADMIN — OXYCODONE HYDROCHLORIDE 2.5 MG: 5 TABLET ORAL at 21:27

## 2019-03-07 RX ADMIN — BUDESONIDE AND FORMOTEROL FUMARATE DIHYDRATE 1 PUFF: 80; 4.5 AEROSOL RESPIRATORY (INHALATION) at 21:47

## 2019-03-07 ASSESSMENT — ENCOUNTER SYMPTOMS
VOMITING: 0
CHILLS: 0
LOSS OF CONSCIOUSNESS: 0
NAUSEA: 0
FALLS: 1
MYALGIAS: 0
DIZZINESS: 0
STRIDOR: 0
PALPITATIONS: 0
COUGH: 0
DIARRHEA: 0
DEPRESSION: 0
HEADACHES: 0
CONSTIPATION: 0
TINGLING: 0
FEVER: 0
SPUTUM PRODUCTION: 0
WEAKNESS: 1
SHORTNESS OF BREATH: 1
ABDOMINAL PAIN: 0

## 2019-03-07 ASSESSMENT — PATIENT HEALTH QUESTIONNAIRE - PHQ9
1. LITTLE INTEREST OR PLEASURE IN DOING THINGS: NOT AT ALL
3. TROUBLE FALLING OR STAYING ASLEEP OR SLEEPING TOO MUCH: SEVERAL DAYS
9. THOUGHTS THAT YOU WOULD BE BETTER OFF DEAD, OR OF HURTING YOURSELF: SEVERAL DAYS
8. MOVING OR SPEAKING SO SLOWLY THAT OTHER PEOPLE COULD HAVE NOTICED. OR THE OPPOSITE, BEING SO FIGETY OR RESTLESS THAT YOU HAVE BEEN MOVING AROUND A LOT MORE THAN USUAL: NOT AT ALL
5. POOR APPETITE OR OVEREATING: NOT AT ALL
7. TROUBLE CONCENTRATING ON THINGS, SUCH AS READING THE NEWSPAPER OR WATCHING TELEVISION: NOT AT ALL
6. FEELING BAD ABOUT YOURSELF - OR THAT YOU ARE A FAILURE OR HAVE LET YOURSELF OR YOUR FAMILY DOWN: NOT AL ALL
2. FEELING DOWN, DEPRESSED, IRRITABLE, OR HOPELESS: SEVERAL DAYS
SUM OF ALL RESPONSES TO PHQ QUESTIONS 1-9: 4
SUM OF ALL RESPONSES TO PHQ9 QUESTIONS 1 AND 2: 1
4. FEELING TIRED OR HAVING LITTLE ENERGY: SEVERAL DAYS

## 2019-03-07 ASSESSMENT — LIFESTYLE VARIABLES
TOTAL SCORE: 0
HAVE PEOPLE ANNOYED YOU BY CRITICIZING YOUR DRINKING: NO
HAVE YOU EVER FELT YOU SHOULD CUT DOWN ON YOUR DRINKING: NO
AVERAGE NUMBER OF DAYS PER WEEK YOU HAVE A DRINK CONTAINING ALCOHOL: 7
CONSUMPTION TOTAL: NEGATIVE
ALCOHOL_USE: YES
TOTAL SCORE: 0
HOW MANY TIMES IN THE PAST YEAR HAVE YOU HAD 5 OR MORE DRINKS IN A DAY: 0
EVER_SMOKED: YES
EVER HAD A DRINK FIRST THING IN THE MORNING TO STEADY YOUR NERVES TO GET RID OF A HANGOVER: NO
ON A TYPICAL DAY WHEN YOU DRINK ALCOHOL HOW MANY DRINKS DO YOU HAVE: 1
TOTAL SCORE: 0
EVER_SMOKED: YES
EVER FELT BAD OR GUILTY ABOUT YOUR DRINKING: NO

## 2019-03-07 ASSESSMENT — COPD QUESTIONNAIRES
COPD SCREENING SCORE: 6
DO YOU EVER COUGH UP ANY MUCUS OR PHLEGM?: NO/ONLY WITH OCCASIONAL COLDS OR INFECTIONS
COPD SCREENING SCORE: 6
DURING THE PAST 4 WEEKS HOW MUCH DID YOU FEEL SHORT OF BREATH: SOME OF THE TIME
DO YOU EVER COUGH UP ANY MUCUS OR PHLEGM?: NO/ONLY WITH OCCASIONAL COLDS OR INFECTIONS
HAVE YOU SMOKED AT LEAST 100 CIGARETTES IN YOUR ENTIRE LIFE: YES
DURING THE PAST 4 WEEKS HOW MUCH DID YOU FEEL SHORT OF BREATH: SOME OF THE TIME
IN THE PAST 12 MONTHS DO YOU DO LESS THAN YOU USED TO BECAUSE OF YOUR BREATHING PROBLEMS: AGREE
HAVE YOU SMOKED AT LEAST 100 CIGARETTES IN YOUR ENTIRE LIFE: YES

## 2019-03-07 ASSESSMENT — COGNITIVE AND FUNCTIONAL STATUS - GENERAL
SUGGESTED CMS G CODE MODIFIER MOBILITY: CL
SUGGESTED CMS G CODE MODIFIER DAILY ACTIVITY: CK
TURNING FROM BACK TO SIDE WHILE IN FLAT BAD: A LITTLE
MOVING TO AND FROM BED TO CHAIR: A LOT
PERSONAL GROOMING: A LITTLE
MOVING FROM LYING ON BACK TO SITTING ON SIDE OF FLAT BED: A LOT
EATING MEALS: A LITTLE
DRESSING REGULAR UPPER BODY CLOTHING: A LITTLE
DAILY ACTIVITIY SCORE: 16
CLIMB 3 TO 5 STEPS WITH RAILING: TOTAL
WALKING IN HOSPITAL ROOM: A LOT
DRESSING REGULAR LOWER BODY CLOTHING: A LOT
TOILETING: A LITTLE
STANDING UP FROM CHAIR USING ARMS: A LOT
MOBILITY SCORE: 12
HELP NEEDED FOR BATHING: A LOT

## 2019-03-07 NOTE — ED NOTES
"Pt is accompanied by his son.  He reports a GLF this past Monday with consequent injury to his right ribs. The pain has been escalating for the past 2 days.   Chief Complaint   Patient presents with   • T-5000 FALL   • Rib Injury     /74   Pulse 77   Temp 37.1 °C (98.7 °F) (Temporal)   Resp 20   Ht 1.702 m (5' 7\")   Wt 60 kg (132 lb 4.4 oz)   SpO2 95%   BMI 20.72 kg/m²     "

## 2019-03-08 PROBLEM — J44.9 COPD (CHRONIC OBSTRUCTIVE PULMONARY DISEASE) (HCC): Status: ACTIVE | Noted: 2019-03-08

## 2019-03-08 LAB
ANION GAP SERPL CALC-SCNC: 9 MMOL/L (ref 0–11.9)
BUN SERPL-MCNC: 28 MG/DL (ref 8–22)
CALCIUM SERPL-MCNC: 8.3 MG/DL (ref 8.4–10.2)
CHLORIDE SERPL-SCNC: 101 MMOL/L (ref 96–112)
CO2 SERPL-SCNC: 26 MMOL/L (ref 20–33)
CREAT SERPL-MCNC: 1.03 MG/DL (ref 0.5–1.4)
ERYTHROCYTE [DISTWIDTH] IN BLOOD BY AUTOMATED COUNT: 53 FL (ref 35.9–50)
GLUCOSE SERPL-MCNC: 108 MG/DL (ref 65–99)
HCT VFR BLD AUTO: 36.2 % (ref 42–52)
HGB BLD-MCNC: 11.4 G/DL (ref 14–18)
MCH RBC QN AUTO: 34 PG (ref 27–33)
MCHC RBC AUTO-ENTMCNC: 31.5 G/DL (ref 33.7–35.3)
MCV RBC AUTO: 108.1 FL (ref 81.4–97.8)
PLATELET # BLD AUTO: 107 K/UL (ref 164–446)
PMV BLD AUTO: 10.5 FL (ref 9–12.9)
POTASSIUM SERPL-SCNC: 4.1 MMOL/L (ref 3.6–5.5)
RBC # BLD AUTO: 3.35 M/UL (ref 4.7–6.1)
SODIUM SERPL-SCNC: 136 MMOL/L (ref 135–145)
WBC # BLD AUTO: 9.1 K/UL (ref 4.8–10.8)

## 2019-03-08 PROCEDURE — 80048 BASIC METABOLIC PNL TOTAL CA: CPT

## 2019-03-08 PROCEDURE — 700102 HCHG RX REV CODE 250 W/ 637 OVERRIDE(OP): Performed by: INTERNAL MEDICINE

## 2019-03-08 PROCEDURE — 36415 COLL VENOUS BLD VENIPUNCTURE: CPT

## 2019-03-08 PROCEDURE — 97166 OT EVAL MOD COMPLEX 45 MIN: CPT

## 2019-03-08 PROCEDURE — 99233 SBSQ HOSP IP/OBS HIGH 50: CPT | Performed by: HOSPITALIST

## 2019-03-08 PROCEDURE — A9270 NON-COVERED ITEM OR SERVICE: HCPCS | Performed by: INTERNAL MEDICINE

## 2019-03-08 PROCEDURE — 770006 HCHG ROOM/CARE - MED/SURG/GYN SEMI*

## 2019-03-08 PROCEDURE — 85027 COMPLETE CBC AUTOMATED: CPT

## 2019-03-08 PROCEDURE — 97161 PT EVAL LOW COMPLEX 20 MIN: CPT

## 2019-03-08 RX ADMIN — POTASSIUM CHLORIDE 20 MEQ: 1500 TABLET, EXTENDED RELEASE ORAL at 07:34

## 2019-03-08 RX ADMIN — CARVEDILOL 6.25 MG: 6.25 TABLET, FILM COATED ORAL at 07:33

## 2019-03-08 RX ADMIN — ENALAPRIL MALEATE 10 MG: 5 TABLET ORAL at 20:24

## 2019-03-08 RX ADMIN — BUDESONIDE AND FORMOTEROL FUMARATE DIHYDRATE 1 PUFF: 80; 4.5 AEROSOL RESPIRATORY (INHALATION) at 06:17

## 2019-03-08 RX ADMIN — CARVEDILOL 6.25 MG: 6.25 TABLET, FILM COATED ORAL at 17:38

## 2019-03-08 RX ADMIN — ALENDRONATE SODIUM 10 MG: 10 TABLET ORAL at 07:35

## 2019-03-08 RX ADMIN — OXYCODONE HYDROCHLORIDE 2.5 MG: 5 TABLET ORAL at 17:38

## 2019-03-08 RX ADMIN — FUROSEMIDE 20 MG: 40 TABLET ORAL at 07:33

## 2019-03-08 RX ADMIN — ENALAPRIL MALEATE 10 MG: 5 TABLET ORAL at 07:34

## 2019-03-08 RX ADMIN — BUDESONIDE AND FORMOTEROL FUMARATE DIHYDRATE 1 PUFF: 80; 4.5 AEROSOL RESPIRATORY (INHALATION) at 17:59

## 2019-03-08 RX ADMIN — ACETAMINOPHEN 650 MG: 325 TABLET, FILM COATED ORAL at 11:31

## 2019-03-08 RX ADMIN — OXYCODONE HYDROCHLORIDE 2.5 MG: 5 TABLET ORAL at 11:31

## 2019-03-08 ASSESSMENT — ENCOUNTER SYMPTOMS
SORE THROAT: 0
EYE PAIN: 0
CHILLS: 0
VOMITING: 0
ABDOMINAL PAIN: 0
INSOMNIA: 0
FEVER: 0
COUGH: 0
BACK PAIN: 0
DEPRESSION: 0
PALPITATIONS: 0
DIZZINESS: 0
NECK PAIN: 0
SHORTNESS OF BREATH: 0
HEADACHES: 0
BLURRED VISION: 0
NAUSEA: 0
TINGLING: 0

## 2019-03-08 ASSESSMENT — COGNITIVE AND FUNCTIONAL STATUS - GENERAL
EATING MEALS: A LITTLE
TOILETING: A LITTLE
DRESSING REGULAR UPPER BODY CLOTHING: A LITTLE
DRESSING REGULAR LOWER BODY CLOTHING: A LOT
DAILY ACTIVITIY SCORE: 17
PERSONAL GROOMING: A LITTLE
SUGGESTED CMS G CODE MODIFIER DAILY ACTIVITY: CK
HELP NEEDED FOR BATHING: A LITTLE

## 2019-03-08 ASSESSMENT — GAIT ASSESSMENTS
DEVIATION: SHUFFLED GAIT;DECREASED HEEL STRIKE;DECREASED TOE OFF
GAIT LEVEL OF ASSIST: CONTACT GUARD ASSIST
ASSISTIVE DEVICE: FRONT WHEEL WALKER
DISTANCE (FEET): 5

## 2019-03-08 ASSESSMENT — ACTIVITIES OF DAILY LIVING (ADL): TOILETING: INDEPENDENT

## 2019-03-08 NOTE — ED NOTES
Assumed care of pt-oxygen applied for mild resp distress.  erp to bedside, orders recvd. Update to pt and son

## 2019-03-08 NOTE — PROGRESS NOTES
2 RN skin assessment done; skin not WDL. See Wound flowsheet.    Pt has skin tear to right upper chest and right forearm.  Pt has bruising to right arm, chest, and trunk area.

## 2019-03-08 NOTE — THERAPY
"Physical Therapy Evaluation completed.   Bed Mobility:  Supine to Sit: Moderate Assist (of 2)  Transfers: Sit to Stand: Contact Guard Assist  Gait: Level Of Assist: Contact Guard Assist with Front-Wheel Walker   X 5 ft    Plan of Care: Will benefit from Physical Therapy 3 times per week  Discharge Recommendations: Equipment: Front-Wheel Walker. Post-acute therapy Discharge to a transitional care facility for continued skilled therapy services.    See \"Rehab Therapy-Acute\" Patient Summary Report for complete documentation.   Pt is a 89 y.o.m. who fell outside his home and hit his ribs on bricks resulting in 4 rib fxs on the right.  The pt lives with his son.  His son does not work.  He is supportive and does the cooking, cleaning, shopping, and laundry.  The pt was indep with household mobility holding furniture.  Pt's has refused to use a walker per family members.  The pt presents with significant rib pain that radiates into his back on the right.  He reports pain with breathing.  Pt demonstrates weakness bilat LE's and reports pain with movement of LE's.  Pt requires assist with all mobility. Gait limited by pain.  The pt is not safe to d/c home and would benefit from cont skilled PT in acute care setting to improve mobility and indep.  Pt may require further skilled PT in snf following d/c to help pt return to PLOF before returning home.   "

## 2019-03-08 NOTE — ED PROVIDER NOTES
ED Provider Note    CHIEF COMPLAINT  Chief Complaint   Patient presents with   • T-5000 FALL   • Rib Injury       HPI  Jhon Begum is a 89 y.o. male who presents in the care of his son.  He lives with his other son and tripped in the back yard over some cement and fell into a planter.  He hit the right side of his chest and his right arm.  He states that ever since his fall he has had shortness of breath.  He denies coughing up any blood.  He denies any head injury or loss of consciousness.  He denies any neck pain back pain or extremity weakness.  He denies any nausea or vomiting.  He has not taken anything for his pain.  His tetanus is not up-to-date.    REVIEW OF SYSTEMS  HEENT:  No ear pain, headache or dizziness, no dental or facial pain no loss of consciousness  EYES: no disharge or vision changes  CARDIAC: Positive right sided chest pain, palpitaions   PULMONARY: Positive dyspnea, no hematemesis or chest wall contusions  GI: no vomiting abdominal pain or contusions  BACK: no back pain, extremity numbness or weakness  Neuro: no weakness, numbness aphasia or headache  Musculoskeletal: no swelling deformity or pain no joint swelling positive right arm contusion  SKIN: no erythema positive contusions to right upper chest and right arm    See history of present illness all other systems are negative          PAST MEDICAL HISTORY  Past Medical History:   Diagnosis Date   • ASTHMA    • Congestive heart failure (HCC)    • Emphysema lung (HCC) 2008   • Pain        FAMILY HISTORY  Family History   Problem Relation Age of Onset   • Cancer Other        SOCIAL HISTORY  Social History     Social History   • Marital status: Single     Spouse name: N/A   • Number of children: N/A   • Years of education: N/A     Social History Main Topics   • Smoking status: Former Smoker     Years: 60.00     Types: Cigarettes     Quit date: 11/19/2010   • Smokeless tobacco: Never Used      Comment: 2 packs per week   • Alcohol use Yes       "Comment: 1 DAILY   • Drug use: No   • Sexual activity: Not on file     Other Topics Concern   • Not on file     Social History Narrative   • No narrative on file       SURGICAL HISTORY  Past Surgical History:   Procedure Laterality Date   • HIP ARTHROPLASTY TOTAL  3/31/08    Performed by WILLIE BURNHAM at SURGERY [facility]   • OTHER  1969    \"16\" twisted intestine removed\"   • OTHER  1969    \"injury to skull above right ear\"   • OTHER  1969    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   Neck: No C-spine tenderness step-offs or crepitance trachea is midline  Cardiovascular: Normal heart rate, Normal rhythm, No murmurs, No rubs, No gallops.   Thorax & Lungs: Normal breath sounds, No respiratory distress, No wheezing, No chest tenderness.  Positive right sided chest wall tenderness in the right upper chest with decreased breath sounds no wheezes rales or rhonchi no subcutaneous emphysema no paradoxical chest wall movements   Abdomen: Bowel sounds normal, Soft, No tenderness, No masses, No pulsatile masses, no abdominal wall contusions  Skin: Warm, Dry, No erythema, No rash positive contusions over the patient's entire right upper and forearm with a small elbow abrasion over the patient's right elbow positive full range of motion of upper and lower extremities without any obvious bony step-offs crepitance or deformity  Back: Mild back pain without any obvious bony " step-offs crepitance or contusions  Extremities: Intact distal pulses, No edema, No tenderness, No cyanosis, No clubbing.   Musculoskeletal: Right upper and forearm contusion as described above in skin exam full range of motion of all extremities   Neurologic: Alert & oriented x 3, Normal motor function, Normal sensory function, No focal deficits noted.     RADIOLOGY/PROCEDURES  CT-CHEST (THORAX) W/O   Final Result      Fractures of the right third, fourth, fifth, sixth ribs with overlying soft tissue edema in the chest wall.      No pulmonary contusion or pneumothorax is identified.      Atherosclerotic plaque including coronary artery calcification.      Emphysematous changes.      Bilateral renal cysts.      Cholelithiasis.      Multiple compression fractures in the thoracic and lumbar spine. These may be chronic.      Demineralization.      Sequelae of prior granulomatous exposure.      DX-ELBOW-COMPLETE 3+ RIGHT    (Results Pending)         COURSE & MEDICAL DECISION MAKING  Pertinent Labs & Imaging studies reviewed. (See chart for details)  Differential diagnosis: Rib fractures, chest wall contusion, pneumonia, arm fracture    The patient has been injured since Monday and is complaining of pain and shortness of breath but he has not had a productive cough or fevers.  CT of the chest does not show pneumonia but he does have 4 rib fractures on the right side of his chest wall.  He has no thorax.  X-ray of the elbow shows no fractures.  According to the patient's family they are very concerned because he is so unsteady that he is unsafe to be sent home.  They wish him to be placed for possible rehab.    6:30 PM  I updated the patient's tetanus and spoke with the trauma surgeon  who states that since his fall and fractures were on Monday he can be safely admitted to [facility] instead of transferred as a trauma    6:40 PM  I spoke with Dr Dubose who accepts the patient for admission      FINAL  IMPRESSION  1. Fall, initial encounter    2. Closed fracture of multiple ribs of right side, initial encounter    3. Contusion of right elbow, initial encounter            Electronically signed by: Casandra Vyas, 3/7/2019

## 2019-03-08 NOTE — ASSESSMENT & PLAN NOTE
-Multiple fractures on the right post fall  -Causing atelectasis, give incentive spirometry  -Pain management  -Not causing pneumothorax or pulmonary contusion  Very high risk for complication  Incentive spirometry  Repeat cxr tomorrow

## 2019-03-08 NOTE — THERAPY
"Occupational Therapy Evaluation completed.   Functional Status:  Pt is a 89 y/o male, admit after a GLF, with resultant multiple rib fx's on the R . Pt lives with his son, who assists with all IADLS, Pt was I with basic self care, AMB short distances. . Pt presents with significant pain with ADLS and functional mobility, that is limiting his I . Pt requires Min A for UB self care, Mod A for LB, Mod A for bed mobility, Min A to CGA for functional transfers. Pt will benefit from acute OT services to increase functional I and safety.  Plan of Care: Will benefit from Occupational Therapy 3 times per week  Discharge Recommendations:  Equipment: Will Continue to Assess for Equipment Needs. Post-acute therapy Discharge to a transitional care facility for continued skilled therapy services vs home with home health -    See \"Rehab Therapy-Acute\" Patient Summary Report for complete documentation.    "

## 2019-03-08 NOTE — ED NOTES
Resumed care of pt-aware of pending admit. hospitalist at bedside. Pt appears more comfortable on rt side

## 2019-03-08 NOTE — ASSESSMENT & PLAN NOTE
-Due to rib fractures and atelectasis. ocmplicated by severe copd.   Not improved.   -continue oxygen  Incentive spirometer.   He remains at High risk for complications with this situation of multiple rib fractures at his age. High risk for pneumonia.

## 2019-03-08 NOTE — PROGRESS NOTES
Received report from NOC RN; assumed pt care. Pt A&Ox4, resting comfortably in bed, no pain unless pt is moving. Pt hard of hearing, updated on POC. Plan for PT/OT today, pt aware. Pt notified to call for assistance, hourly rounding, per NOC RN pt is incontinent.

## 2019-03-08 NOTE — DIETARY
"Nutrition services: Day 1 of admit.  Jhon Begum is a 89 y.o. male with admitting DX of hypoxia. Pt with acute respiratory failure due to fractured ribs sustained in a fall while doing yard work. Hx includes asthma and CHF and emphysema.   Consult received for unplanned weight loss      Assessment:  Height: 170.2 cm (5' 7\")  Weight: 60 kg (132 lb 4.4 oz)  Body mass index is 20.72 kg/m².   Diet/Intake: regular/no recorded intake    Evaluation:   1. Labs: 3/8/19: BUN=28 (improving)  2. Meds: Lasix (refused), KCl (refused)  3. Pt reports weight loss of 20# (13%)  2 years ago. No weight loss noted in the past 6 months. Pt lives with his son who prepares the meals. He said that he eats 2 1/2 meals a day with report of cereals and eggs eaten at breakfast and a sandwich at lunch. Upon RD visit pt with lunch on his tray table. He reports that he can't eat the meal because he ate 2 hours ago and he is not hungry. He said the meal looks good. Small portions requested and Boost Plus supplements added to all meal trays for additional kcals and protein. Regular size portions may have been overwhelming.     Malnutrition Risk: No criteria noted for malnutrition.     Recommendations/Plan:  1. Provide diet with small portions  2. Add Boost Plus to all meal trays.   3. Encourage intake of >50%  4. Document intake of all meals  as % taken in ADL's to provide interdisciplinary communication across all shifts.   5. Monitor weight.  6. Nutrition rep will continue to see patient for ongoing meal and snack preferences.   7. RD will monitor.           "

## 2019-03-08 NOTE — FLOWSHEET NOTE
03/07/19 5674   Type of Assessment   Assessment Yes   Patient History   Home O2 Use Prior To Admission? No   Home Treatments/Frequency No   COPD Risk Screening   Do you have a history of COPD? No   COPD Population Screener   During the past 4 weeks, how much did you feel short of breath? 1   Do you ever cough up any mucus or phlegm? 0   In the past 12 months, you do less than you used to because of your breathing problems 1   Have you smoked at least 100 cigarettes in your entire life? 2   How old are you? 2   COPD Screening Score 6   Smoking History   Have you ever smoked Yes   Have you smoked in the last 12 months No   Confirm Quit Date 03/07/01   Level Of Consciousness   Level of Consciousness Alert   Respiratory WDL   Respiratory (WDL) WDL   Chest Exam   Respiration 16   Pulse 80

## 2019-03-08 NOTE — H&P
[facility] Medicine History & Physical Note    Date of Service  3/7/2019    Primary Care Physician  Ihsan Monet M.D.    Consultants  None    Code Status  DNR    Chief Complaint  Chest pain    History of Presenting Illness  89 y.o. male who presented 3/7/2019 with chest pain and shortness of breath.  Patient was working in his yard on Monday, states he lost his balance, fell and hit his chest on some bricks.  Patient states since then he has had difficulty getting around, any kind of movement causes severe chest pain, also some difficulty breathing.  He describes the pain as located on the right side of his chest with radiation to the back, sharp in nature, 10/10 at its worst.  Patient states whenever he is lying down and not moving the pain is fine.  Patient states he also has felt unsteady and weak at times.    Review of Systems  Review of Systems   Constitutional: Negative for chills, fever and malaise/fatigue.   HENT: Negative for congestion.    Respiratory: Positive for shortness of breath. Negative for cough, sputum production and stridor.    Cardiovascular: Positive for chest pain. Negative for palpitations and leg swelling.   Gastrointestinal: Negative for abdominal pain, constipation, diarrhea, nausea and vomiting.   Genitourinary: Negative for dysuria and urgency.   Musculoskeletal: Positive for falls. Negative for myalgias.   Neurological: Positive for weakness. Negative for dizziness, tingling, loss of consciousness and headaches.   Psychiatric/Behavioral: Negative for depression and suicidal ideas.   All other systems reviewed and are negative.      Past Medical History   has a past medical history of ASTHMA; Congestive heart failure (HCC); Emphysema lung (HCC) (2008); and Pain.    Surgical History   has a past surgical history that includes other (1969); other (1969); hip arthroplasty total (3/31/08); other abdominal surgery; and other (1969).     Family History  family history includes Cancer in  his other.     Social History   reports that he quit smoking about 8 years ago. His smoking use included Cigarettes. He quit after 60.00 years of use. He has never used smokeless tobacco. He reports that he drinks alcohol. He reports that he does not use drugs.    Allergies  Allergies   Allergen Reactions   • Nkda [No Known Drug Allergy]        Medications  Prior to Admission Medications   Prescriptions Last Dose Informant Patient Reported? Taking?   SYMBICORT 80-4.5 MCG/ACT Aerosol   Yes No   Sig: Inhale 1 Puff by mouth 2 Times a Day.   alendronate (FOSAMAX) 10 MG TABS   No No   Sig: Take 1 Tab by mouth every morning before breakfast.   atorvastatin (LIPITOR) 10 MG TABS   No No   Sig: Take 1 Tab by mouth every bedtime.   carvedilol (COREG) 6.25 MG Tab   No No   Sig: Take 1 Tab by mouth 2 times a day, with meals.   enalapril (VASOTEC) 10 MG Tab   No No   Sig: Take 1 Tab by mouth 2 times a day.   furosemide (LASIX) 20 MG Tab   No No   Sig: Take 1 Tab by mouth every day.   potassium chloride SA (K-DUR) 10 MEQ Tab CR   No No   Sig: Take 2 Tabs by mouth every day.      Facility-Administered Medications: None       Physical Exam  Temp:  [37.1 °C (98.7 °F)] 37.1 °C (98.7 °F)  Pulse:  [77-83] 81  Resp:  [19-41] 19  BP: (125)/(74) 125/74  SpO2:  [87 %-100 %] 100 %    Physical Exam   Constitutional: He is oriented to person, place, and time. He appears well-developed and well-nourished.  Non-toxic appearance. No distress.   HENT:   Head: Normocephalic and atraumatic. Not macrocephalic and not microcephalic. Head is without raccoon's eyes and without Self's sign.   Right Ear: External ear normal.   Left Ear: External ear normal.   Mouth/Throat: Oropharynx is clear and moist. No oropharyngeal exudate.   Eyes: Conjunctivae are normal. Right eye exhibits no discharge. Left eye exhibits no discharge. No scleral icterus.   Neck: Normal range of motion. Neck supple. No tracheal deviation, no edema and no erythema present.    Cardiovascular: Normal rate, regular rhythm, normal heart sounds and intact distal pulses.  Exam reveals no gallop, no friction rub and no decreased pulses.    No murmur heard.  Pulmonary/Chest: Effort normal. No stridor. No respiratory distress. He has no decreased breath sounds. He has no wheezes. He has no rhonchi. He has rales in the right lower field. He exhibits tenderness.   Abdominal: Soft. Bowel sounds are normal. He exhibits no distension. There is no splenomegaly or hepatomegaly. There is no tenderness. There is no rebound and no guarding.   Musculoskeletal: Normal range of motion. He exhibits no edema, tenderness or deformity.   Lymphadenopathy:     He has no cervical adenopathy.   Neurological: He is alert and oriented to person, place, and time. No cranial nerve deficit. Coordination normal.   Skin: Skin is warm and dry. No rash noted. He is not diaphoretic. No cyanosis or erythema. No pallor. Nails show no clubbing.   Significant bruising over right chest and right arm   Psychiatric: He has a normal mood and affect. His speech is normal and behavior is normal. Judgment and thought content normal. Cognition and memory are normal.   Nursing note and vitals reviewed.      Laboratory:  Recent Labs      03/07/19   1736   WBC  12.7*   RBC  3.88*   HEMOGLOBIN  12.9*   HEMATOCRIT  40.2*   MCV  103.6*   MCH  33.2*   MCHC  32.1*   RDW  50.4*   PLATELETCT  98*   MPV  9.9     Recent Labs      03/07/19   1736   SODIUM  135   POTASSIUM  4.3   CHLORIDE  99   CO2  27   GLUCOSE  122*   BUN  33*   CREATININE  1.08   CALCIUM  8.9     Recent Labs      03/07/19   1736   ALTSGPT  26   ASTSGOT  37   ALKPHOSPHAT  61   TBILIRUBIN  2.2*   GLUCOSE  122*     Recent Labs      03/07/19   1736   APTT  23.6*   INR  1.09             No results for input(s): TROPONINI in the last 72 hours.    Urinalysis:    No results found     Imaging:  DX-ELBOW-COMPLETE 3+ RIGHT   Final Result      1.  Limited study due to patient positioning. No  definite fracture seen.      2.  Degenerative change.      3.  Likely intra-articular ossific loose bodies.      CT-CHEST (THORAX) W/O   Final Result      Fractures of the right third, fourth, fifth, sixth ribs with overlying soft tissue edema in the chest wall.      No pulmonary contusion or pneumothorax is identified.      Atherosclerotic plaque including coronary artery calcification.      Emphysematous changes.      Bilateral renal cysts.      Cholelithiasis.      Multiple compression fractures in the thoracic and lumbar spine. These may be chronic.      Demineralization.      Sequelae of prior granulomatous exposure.            Assessment/Plan:  I anticipate this patient will require at least two midnights for appropriate medical management, necessitating inpatient admission.    Acute respiratory failure (HCC)   Assessment & Plan    -Due to rib fractures and atelectasis  -when on RA his sats drop to 87%  -continue oxygen       Thrombocytopenia (HCC)   Assessment & Plan    -No sign of gross bleeding  -Repeat CBC in the morning     Leukocytosis   Assessment & Plan    -Likely reactive  -No additional sign of infection  -No antibiotics warranted     Rib fractures   Assessment & Plan    -Multiple fractures on the right post fall  -Causing atelectasis, give incentive spirometry  -Pain management  -Not causing pneumothorax or pulmonary contusion     HTN (hypertension)- (present on admission)   Assessment & Plan    -Continue home Coreg and enalapril  -Placed as needed enalapril  -Adjust as needed     Chronic systolic congestive heart failure (HCC)- (present on admission)   Assessment & Plan    -no acute exacerbation         VTE prophylaxis: SCDs

## 2019-03-08 NOTE — FLOWSHEET NOTE
03/07/19 4245   Interdisciplinary Plan of Care-Outcomes    Bronchodilator Outcome Patient at Stable Baseline   Education   Education Yes - Pt. / Family has been Instructed in use of Respiratory Equipment;Yes - Pt. / Family has been Instructed in use of Respiratory Medications and Adverse Reactions   RT Assessment of Delivered Medications   Evaluation of Medication Delivery Daily Yes-- Pt /Family has been Instructed in use of Respiratory Medications and Adverse Reactions   MDI/DPI Group   #MDI/DPI Given MDI/DPI x 1   Respiratory WDL   Respiratory (WDL) X   Chest Exam   Respiration 16   Pulse 80   Breath Sounds   Pre/Post Intervention Pre Intervention Assessment   RUL Breath Sounds Diminished   CARROLL Breath Sounds Diminished   Oximetry   #Pulse Oximetry (Single Determination) Yes   Oxygen   Home O2 Use Prior To Admission? No   Pulse Oximetry 96 %   O2 (LPM) 2   O2 Daily Delivery Respiratory  Silicone Nasal Cannula

## 2019-03-08 NOTE — PROGRESS NOTES
[facility] Medicine Daily Progress Note    Date of Service  3/8/2019    Chief Complaint  89 y.o. male admitted 3/7/2019 with chest pain.     Hospital Course    He has been found to have multiple rib fractures after a fall at home. This was complicated by acute respiratory failure with hypoxia. He was admitted for pain control and respiratory therapy.       Interval Problem Update  Having a lot of pain with movement and breathing. We discussed breathing deep and I provided him with an incentive spirometer.       I reviewed his imaging.   DX-ELBOW-COMPLETE 3+ RIGHT   Final Result       1.  Limited study due to patient positioning. No definite fracture seen.       2.  Degenerative change.       3.  Likely intra-articular ossific loose bodies.     CT-CHEST (THORAX) W/O   Final Result       Fractures of the right third, fourth, fifth, sixth ribs with overlying soft tissue edema in the chest wall.       No pulmonary contusion or pneumothorax is identified.       Atherosclerotic plaque including coronary artery calcification.       Emphysematous changes.       Bilateral renal cysts.       Cholelithiasis.       Multiple compression fractures in the thoracic and lumbar spine. These may be chronic.       Demineralization.       Sequelae of prior granulomatous exposure.     Echocardiography Laboratory    CONCLUSIONS  Mild concentric left ventricular hypertrophy.  Mildly reduced left ventricular systolic function.  Left ventricular ejection fraction is visually estimated to be 45%.  Indeterminate diastolic function.  Normal inferior vena cava size and inspiratory collapse.  Aortic sclerosis without stenosis.  Moderate aortic insufficiency.  Estimated right ventricular systolic pressure  is 50 mmHg.      Consultants/Specialty  none    Code Status  DNR    Disposition  Pt/ot consulted for consideration of placement.     Review of Systems  Review of Systems   Constitutional: Negative for chills and fever.   HENT: Negative for sore  throat.    Eyes: Negative for blurred vision and pain.   Respiratory: Negative for cough and shortness of breath.    Cardiovascular: Negative for chest pain and palpitations.   Gastrointestinal: Negative for abdominal pain, nausea and vomiting.   Genitourinary: Negative for dysuria and urgency.   Musculoskeletal: Negative for back pain and neck pain.   Skin: Negative for itching and rash.   Neurological: Negative for dizziness, tingling and headaches.   Psychiatric/Behavioral: Negative for depression. The patient does not have insomnia.    All other systems reviewed and are negative.       Physical Exam  Temp:  [36.2 °C (97.2 °F)-37.1 °C (98.7 °F)] 36.6 °C (97.8 °F)  Pulse:  [48-90] 70  Resp:  [16-41] 20  BP: (123-158)/(74-87) 145/78  SpO2:  [87 %-100 %] 96 %    Physical Exam   Constitutional: He is oriented to person, place, and time. He appears well-developed and well-nourished. No distress.   Patient seen and examined  Plan discussed with RN   HENT:   Right Ear: External ear normal.   Left Ear: External ear normal.   Nose: Nose normal.   Eyes: Right eye exhibits no discharge. Left eye exhibits no discharge. No scleral icterus.   Neck: No JVD present. No tracheal deviation present.   Cardiovascular: Normal rate, normal heart sounds and intact distal pulses.    No murmur heard.  Cap refill 2sec  Pulses 2+ throughout     Pulmonary/Chest: Effort normal and breath sounds normal. No respiratory distress. He has no wheezes. He has no rales.   Abdominal: Soft. Bowel sounds are normal. He exhibits no distension. There is no tenderness. There is no guarding.   Musculoskeletal: He exhibits tenderness. He exhibits no edema.   Neurological: He is alert and oriented to person, place, and time.   Skin: Skin is warm and dry. He is not diaphoretic. No erythema.   Normal skin color   Psychiatric: He has a normal mood and affect. His behavior is normal.   Nursing note and vitals reviewed.      Fluids    Intake/Output Summary (Last  24 hours) at 03/08/19 0934  Last data filed at 03/08/19 0000   Gross per 24 hour   Intake              240 ml   Output                0 ml   Net              240 ml       Laboratory  Recent Labs      03/07/19 1736 03/08/19   0509   WBC  12.7*  9.1   RBC  3.88*  3.35*   HEMOGLOBIN  12.9*  11.4*   HEMATOCRIT  40.2*  36.2*   MCV  103.6*  108.1*   MCH  33.2*  34.0*   MCHC  32.1*  31.5*   RDW  50.4*  53.0*   PLATELETCT  98*  107*   MPV  9.9  10.5     Recent Labs      03/07/19 1736 03/08/19   0509   SODIUM  135  136   POTASSIUM  4.3  4.1   CHLORIDE  99  101   CO2  27  26   GLUCOSE  122*  108*   BUN  33*  28*   CREATININE  1.08  1.03   CALCIUM  8.9  8.3*     Recent Labs      03/07/19 1736   APTT  23.6*   INR  1.09               Imaging  DX-ELBOW-COMPLETE 3+ RIGHT   Final Result      1.  Limited study due to patient positioning. No definite fracture seen.      2.  Degenerative change.      3.  Likely intra-articular ossific loose bodies.      CT-CHEST (THORAX) W/O   Final Result      Fractures of the right third, fourth, fifth, sixth ribs with overlying soft tissue edema in the chest wall.      No pulmonary contusion or pneumothorax is identified.      Atherosclerotic plaque including coronary artery calcification.      Emphysematous changes.      Bilateral renal cysts.      Cholelithiasis.      Multiple compression fractures in the thoracic and lumbar spine. These may be chronic.      Demineralization.      Sequelae of prior granulomatous exposure.           Assessment/Plan  Acute respiratory failure with hypoxia (HCC)- (present on admission)   Assessment & Plan    -Due to rib fractures and atelectasis  -when on RA his sats drop to 87%  -continue oxygen  Incentive spirometer.   High risk for complications with this situation.      COPD (chronic obstructive pulmonary disease) (HCC)   Assessment & Plan    ?chronic hypoxia from this as well?  No flare  Rt protocol     Thrombocytopenia (HCC)- (present on admission)    Assessment & Plan    -No sign of gross bleeding  -Repeat CBC in the morning     Leukocytosis- (present on admission)   Assessment & Plan    -Likely reactive  -No additional sign of infection  -No antibiotics warranted     Rib fractures- (present on admission)   Assessment & Plan    -Multiple fractures on the right post fall  -Causing atelectasis, give incentive spirometry  -Pain management  -Not causing pneumothorax or pulmonary contusion     HTN (hypertension)- (present on admission)   Assessment & Plan    -Continue home Coreg and enalapril  -Placed as needed enalapril  -Adjust as needed     Chronic systolic congestive heart failure (HCC)- (present on admission)   Assessment & Plan    -no acute exacerbation          VTE prophylaxis: scd

## 2019-03-08 NOTE — ED NOTES
Pt's son Moi [phone] requesting to be notified of any issues, also to be called if pt is d/c home. However they are requesting that he stays in the hospital for placement.

## 2019-03-08 NOTE — CARE PLAN
Problem: Safety  Goal: Will remain free from falls  Outcome: PROGRESSING AS EXPECTED    Intervention: Implement fall precautions  Room/floor clear. Non skid socks on. Proper signs up. Bed alarm on, bed low and locked.  Call light and belongings within reach.  Hourly rounding in place to make sure needs are met.      Problem: Pain Management  Goal: Pain level will decrease to patient's comfort goal  Outcome: PROGRESSING AS EXPECTED    Intervention: Follow pain managment plan developed in collaboration with patient and Interdisciplinary Team  Pain assessment q4h or q2h after medication intervention.  Encourage patient to report pain, verbalize understanding. Medicate PRN

## 2019-03-09 LAB
ANION GAP SERPL CALC-SCNC: 6 MMOL/L (ref 0–11.9)
BUN SERPL-MCNC: 28 MG/DL (ref 8–22)
CALCIUM SERPL-MCNC: 7.9 MG/DL (ref 8.4–10.2)
CHLORIDE SERPL-SCNC: 97 MMOL/L (ref 96–112)
CO2 SERPL-SCNC: 27 MMOL/L (ref 20–33)
CREAT SERPL-MCNC: 1.01 MG/DL (ref 0.5–1.4)
GLUCOSE SERPL-MCNC: 92 MG/DL (ref 65–99)
POTASSIUM SERPL-SCNC: 4 MMOL/L (ref 3.6–5.5)
SODIUM SERPL-SCNC: 130 MMOL/L (ref 135–145)

## 2019-03-09 PROCEDURE — 770006 HCHG ROOM/CARE - MED/SURG/GYN SEMI*

## 2019-03-09 PROCEDURE — 36415 COLL VENOUS BLD VENIPUNCTURE: CPT

## 2019-03-09 PROCEDURE — A9270 NON-COVERED ITEM OR SERVICE: HCPCS | Performed by: INTERNAL MEDICINE

## 2019-03-09 PROCEDURE — 700102 HCHG RX REV CODE 250 W/ 637 OVERRIDE(OP): Performed by: INTERNAL MEDICINE

## 2019-03-09 PROCEDURE — 99233 SBSQ HOSP IP/OBS HIGH 50: CPT | Performed by: HOSPITALIST

## 2019-03-09 PROCEDURE — 80048 BASIC METABOLIC PNL TOTAL CA: CPT

## 2019-03-09 RX ADMIN — ENALAPRIL MALEATE 10 MG: 5 TABLET ORAL at 09:06

## 2019-03-09 RX ADMIN — CARVEDILOL 6.25 MG: 6.25 TABLET, FILM COATED ORAL at 17:56

## 2019-03-09 RX ADMIN — ALENDRONATE SODIUM 10 MG: 10 TABLET ORAL at 06:13

## 2019-03-09 RX ADMIN — OXYCODONE HYDROCHLORIDE 2.5 MG: 5 TABLET ORAL at 09:06

## 2019-03-09 RX ADMIN — OXYCODONE HYDROCHLORIDE 2.5 MG: 5 TABLET ORAL at 18:20

## 2019-03-09 RX ADMIN — BUDESONIDE AND FORMOTEROL FUMARATE DIHYDRATE 1 PUFF: 80; 4.5 AEROSOL RESPIRATORY (INHALATION) at 18:00

## 2019-03-09 RX ADMIN — FUROSEMIDE 20 MG: 40 TABLET ORAL at 06:03

## 2019-03-09 RX ADMIN — CARVEDILOL 6.25 MG: 6.25 TABLET, FILM COATED ORAL at 09:06

## 2019-03-09 RX ADMIN — ATORVASTATIN CALCIUM 10 MG: 10 TABLET, FILM COATED ORAL at 21:50

## 2019-03-09 ASSESSMENT — ENCOUNTER SYMPTOMS
EYE PAIN: 0
NAUSEA: 0
PALPITATIONS: 0
DIZZINESS: 0
DEPRESSION: 0
ABDOMINAL PAIN: 0
SHORTNESS OF BREATH: 0
INSOMNIA: 0
CHILLS: 0
BLURRED VISION: 0
NECK PAIN: 0
FEVER: 0
TINGLING: 0
SORE THROAT: 0
BACK PAIN: 0
COUGH: 0

## 2019-03-09 NOTE — PROGRESS NOTES
Pt reassessed and repositioned for comfort. Vital signs WDL. Pain is controlled. O2 sat @ 99% Wanted to rest and doesn't want any other interventions for now. Safety precautions in place.

## 2019-03-09 NOTE — PROGRESS NOTES
"Assessment completed. Pt A&Ox4. Denies any pain, nausea, SOB. Due meds given. Tried to hide Atorvastatin in his hand and pretended to take it. When this RN attempted to check pt's hand and asked pt to take medication pt stated \"I am not going to take any more pill tonight, not tonight\"  Medication was taken from pt and wasted. Redness are seen on pt's upper extremities especially on right forearm and near elbow. On 2L O2 NC sat 97%. Safety and comfort measures in place. No additional needs at this time.  "

## 2019-03-09 NOTE — PROGRESS NOTES
Report given to NANO Henson. POC discussed. Pt resting comfortably in bed w/o signs of discomfort. Safety precautions in place.

## 2019-03-09 NOTE — PROGRESS NOTES
Hospital Medicine Daily Progress Note    Date of Service  3/9/2019    Chief Complaint  89 y.o. male admitted 3/7/2019 with chest pain.     Hospital Course    He has been found to have multiple rib fractures after a fall at home. This was complicated by acute respiratory failure with hypoxia. He was admitted for pain control and respiratory therapy.       Interval Problem Update  3/8- Having a lot of pain with movement and breathing. We discussed breathing deep and I provided him with an incentive spirometer.   3/9- still hypoxic. Pain is a little better and he is able to ambulate.     I reviewed his imaging.   DX-ELBOW-COMPLETE 3+ RIGHT   Final Result       1.  Limited study due to patient positioning. No definite fracture seen.       2.  Degenerative change.       3.  Likely intra-articular ossific loose bodies.     CT-CHEST (THORAX) W/O   Final Result       Fractures of the right third, fourth, fifth, sixth ribs with overlying soft tissue edema in the chest wall.       No pulmonary contusion or pneumothorax is identified.       Atherosclerotic plaque including coronary artery calcification.       Emphysematous changes.       Bilateral renal cysts.       Cholelithiasis.       Multiple compression fractures in the thoracic and lumbar spine. These may be chronic.       Demineralization.       Sequelae of prior granulomatous exposure.     Echocardiography Laboratory    CONCLUSIONS  Mild concentric left ventricular hypertrophy.  Mildly reduced left ventricular systolic function.  Left ventricular ejection fraction is visually estimated to be 45%.  Indeterminate diastolic function.  Normal inferior vena cava size and inspiratory collapse.  Aortic sclerosis without stenosis.  Moderate aortic insufficiency.  Estimated right ventricular systolic pressure  is 50 mmHg.      Consultants/Specialty  none    Code Status  DNR    Disposition  Pt/ot consulted for consideration of placement.     Review of Systems  Review of Systems    Constitutional: Negative for chills and fever.   HENT: Negative for sore throat.    Eyes: Negative for blurred vision and pain.   Respiratory: Negative for cough and shortness of breath.    Cardiovascular: Positive for chest pain. Negative for palpitations.   Gastrointestinal: Negative for abdominal pain and nausea.   Genitourinary: Negative for dysuria and urgency.   Musculoskeletal: Negative for back pain and neck pain.   Skin: Negative for itching and rash.   Neurological: Negative for dizziness and tingling.   Psychiatric/Behavioral: Negative for depression. The patient does not have insomnia.    All other systems reviewed and are negative.       Physical Exam  Temp:  [36.4 °C (97.5 °F)-36.7 °C (98 °F)] 36.5 °C (97.7 °F)  Pulse:  [66-72] 72  Resp:  [18-20] 18  BP: ()/(49-64) 124/64  SpO2:  [97 %-99 %] 99 %    Physical Exam   Constitutional: He is oriented to person, place, and time. He appears well-developed and well-nourished. No distress.   Patient seen and examined  Plan discussed with RN   HENT:   Right Ear: External ear normal.   Left Ear: External ear normal.   Nose: Nose normal.   Eyes: Right eye exhibits no discharge. Left eye exhibits no discharge. No scleral icterus.   Neck: No JVD present. No tracheal deviation present.   Cardiovascular: Normal rate, normal heart sounds and intact distal pulses.    No murmur heard.  Cap refill 2sec  Pulses 2+ throughout     Pulmonary/Chest: Effort normal and breath sounds normal. No respiratory distress. He has no rales.   Decreased bs throughout.    Abdominal: Soft. Bowel sounds are normal. He exhibits no distension. There is no tenderness. There is no guarding.   Musculoskeletal: He exhibits tenderness. He exhibits no edema.   Neurological: He is alert and oriented to person, place, and time.   Skin: Skin is warm and dry. He is not diaphoretic. No erythema.   Normal skin color   Psychiatric: He has a normal mood and affect. His behavior is normal.   Nursing  note and vitals reviewed.      Fluids    Intake/Output Summary (Last 24 hours) at 03/09/19 0901  Last data filed at 03/09/19 0600   Gross per 24 hour   Intake              240 ml   Output              750 ml   Net             -510 ml       Laboratory  Recent Labs      03/07/19 1736 03/08/19   0509   WBC  12.7*  9.1   RBC  3.88*  3.35*   HEMOGLOBIN  12.9*  11.4*   HEMATOCRIT  40.2*  36.2*   MCV  103.6*  108.1*   MCH  33.2*  34.0*   MCHC  32.1*  31.5*   RDW  50.4*  53.0*   PLATELETCT  98*  107*   MPV  9.9  10.5     Recent Labs      03/07/19 1736 03/08/19   0509  03/09/19   0442   SODIUM  135  136  130*   POTASSIUM  4.3  4.1  4.0   CHLORIDE  99  101  97   CO2  27  26  27   GLUCOSE  122*  108*  92   BUN  33*  28*  28*   CREATININE  1.08  1.03  1.01   CALCIUM  8.9  8.3*  7.9*     Recent Labs      03/07/19 1736   APTT  23.6*   INR  1.09               Imaging  DX-ELBOW-COMPLETE 3+ RIGHT   Final Result      1.  Limited study due to patient positioning. No definite fracture seen.      2.  Degenerative change.      3.  Likely intra-articular ossific loose bodies.      CT-CHEST (THORAX) W/O   Final Result      Fractures of the right third, fourth, fifth, sixth ribs with overlying soft tissue edema in the chest wall.      No pulmonary contusion or pneumothorax is identified.      Atherosclerotic plaque including coronary artery calcification.      Emphysematous changes.      Bilateral renal cysts.      Cholelithiasis.      Multiple compression fractures in the thoracic and lumbar spine. These may be chronic.      Demineralization.      Sequelae of prior granulomatous exposure.           Assessment/Plan  Acute respiratory failure with hypoxia (HCC)- (present on admission)   Assessment & Plan    -Due to rib fractures and atelectasis. ocmplicated by severe copd.   Not improved.   -continue oxygen  Incentive spirometer.   He remains at High risk for complications with this situation of multiple rib fractures at his age.  High risk for pneumonia.       COPD (chronic obstructive pulmonary disease) (HCC)   Assessment & Plan    ?chronic hypoxia from this as well?  No flare  Rt protocol     Thrombocytopenia (HCC)- (present on admission)   Assessment & Plan    -No sign of gross bleeding       Rib fractures- (present on admission)   Assessment & Plan    -Multiple fractures on the right post fall  -Causing atelectasis, give incentive spirometry  -Pain management  -Not causing pneumothorax or pulmonary contusion  Very high risk for complication  Incentive spirometry  Repeat cxr tomorrow       HTN (hypertension)- (present on admission)   Assessment & Plan    -Continue home Coreg and enalapril     Chronic systolic congestive heart failure (HCC)- (present on admission)   Assessment & Plan    -no acute exacerbation          VTE prophylaxis: scd

## 2019-03-09 NOTE — CARE PLAN
Problem: Safety  Goal: Will remain free from injury  Pt instructed to call and use call light when in need of help. Call light and personal belonging w/n reach. Bed alarm on    Problem: Knowledge Deficit  Goal: Knowledge of disease process/condition, treatment plan, diagnostic tests, and medications will improve  Outcome: PROGRESSING AS EXPECTED  Pt educated and informed on the treatment plans, the need to follow prescribed medications, especially pt tried to hide medication upon administration.  Educated and encouraged on the importance of mobilization/ambulation as tolerated to regain strength. Educated factors that contribute fatigue or reduced alertness. Encouraged to voice feelings.

## 2019-03-09 NOTE — PROGRESS NOTES
Pt ambulated to the br using fww and 1 person sba.  Had lg formed bm.voided w/o difficulty.  Medicated withn oxy 2.5 mg for rib pain  Up in recliner at present.

## 2019-03-09 NOTE — PROGRESS NOTES
Pt ambulated to the br using fww .sba,gait fast but steady.  Voided in toilet.  C/o rib pain medicated with 2.5mg oxy.

## 2019-03-10 ENCOUNTER — APPOINTMENT (OUTPATIENT)
Dept: RADIOLOGY | Facility: MEDICAL CENTER | Age: 84
DRG: 183 | End: 2019-03-10
Attending: HOSPITALIST
Payer: MEDICARE

## 2019-03-10 LAB
ANION GAP SERPL CALC-SCNC: 8 MMOL/L (ref 0–11.9)
BUN SERPL-MCNC: 35 MG/DL (ref 8–22)
CALCIUM SERPL-MCNC: 8.1 MG/DL (ref 8.4–10.2)
CHLORIDE SERPL-SCNC: 97 MMOL/L (ref 96–112)
CO2 SERPL-SCNC: 27 MMOL/L (ref 20–33)
CREAT SERPL-MCNC: 1.39 MG/DL (ref 0.5–1.4)
GLUCOSE BLD-MCNC: 146 MG/DL (ref 65–99)
GLUCOSE SERPL-MCNC: 103 MG/DL (ref 65–99)
POTASSIUM SERPL-SCNC: 4.3 MMOL/L (ref 3.6–5.5)
SODIUM SERPL-SCNC: 132 MMOL/L (ref 135–145)

## 2019-03-10 PROCEDURE — 700105 HCHG RX REV CODE 258: Performed by: HOSPITALIST

## 2019-03-10 PROCEDURE — 80048 BASIC METABOLIC PNL TOTAL CA: CPT

## 2019-03-10 PROCEDURE — A9270 NON-COVERED ITEM OR SERVICE: HCPCS | Performed by: INTERNAL MEDICINE

## 2019-03-10 PROCEDURE — 700102 HCHG RX REV CODE 250 W/ 637 OVERRIDE(OP): Performed by: INTERNAL MEDICINE

## 2019-03-10 PROCEDURE — 99232 SBSQ HOSP IP/OBS MODERATE 35: CPT | Performed by: HOSPITALIST

## 2019-03-10 PROCEDURE — 770006 HCHG ROOM/CARE - MED/SURG/GYN SEMI*

## 2019-03-10 PROCEDURE — 36415 COLL VENOUS BLD VENIPUNCTURE: CPT

## 2019-03-10 PROCEDURE — 82962 GLUCOSE BLOOD TEST: CPT

## 2019-03-10 PROCEDURE — 71045 X-RAY EXAM CHEST 1 VIEW: CPT

## 2019-03-10 PROCEDURE — 97535 SELF CARE MNGMENT TRAINING: CPT

## 2019-03-10 RX ORDER — SODIUM CHLORIDE 9 MG/ML
INJECTION, SOLUTION INTRAVENOUS CONTINUOUS
Status: DISCONTINUED | OUTPATIENT
Start: 2019-03-10 | End: 2019-03-11 | Stop reason: HOSPADM

## 2019-03-10 RX ORDER — IBUPROFEN 600 MG/1
600 TABLET ORAL EVERY 6 HOURS PRN
Status: DISCONTINUED | OUTPATIENT
Start: 2019-03-10 | End: 2019-03-11 | Stop reason: HOSPADM

## 2019-03-10 RX ORDER — TRAMADOL HYDROCHLORIDE 50 MG/1
50 TABLET ORAL EVERY 6 HOURS PRN
Status: DISCONTINUED | OUTPATIENT
Start: 2019-03-10 | End: 2019-03-11 | Stop reason: HOSPADM

## 2019-03-10 RX ADMIN — STANDARDIZED SENNA CONCENTRATE AND DOCUSATE SODIUM 2 TABLET: 8.6; 5 TABLET, FILM COATED ORAL at 05:47

## 2019-03-10 RX ADMIN — ALENDRONATE SODIUM 10 MG: 10 TABLET ORAL at 05:49

## 2019-03-10 RX ADMIN — ENALAPRIL MALEATE 10 MG: 5 TABLET ORAL at 09:38

## 2019-03-10 RX ADMIN — OXYCODONE HYDROCHLORIDE 5 MG: 5 TABLET ORAL at 19:14

## 2019-03-10 RX ADMIN — CARVEDILOL 6.25 MG: 6.25 TABLET, FILM COATED ORAL at 18:15

## 2019-03-10 RX ADMIN — SODIUM CHLORIDE: 9 INJECTION, SOLUTION INTRAVENOUS at 11:49

## 2019-03-10 RX ADMIN — CARVEDILOL 6.25 MG: 6.25 TABLET, FILM COATED ORAL at 09:37

## 2019-03-10 RX ADMIN — OXYCODONE HYDROCHLORIDE 5 MG: 5 TABLET ORAL at 05:48

## 2019-03-10 RX ADMIN — ENALAPRIL MALEATE 10 MG: 5 TABLET ORAL at 19:14

## 2019-03-10 RX ADMIN — FUROSEMIDE 20 MG: 40 TABLET ORAL at 05:50

## 2019-03-10 RX ADMIN — POTASSIUM CHLORIDE 20 MEQ: 1500 TABLET, EXTENDED RELEASE ORAL at 05:47

## 2019-03-10 RX ADMIN — BUDESONIDE AND FORMOTEROL FUMARATE DIHYDRATE 1 PUFF: 80; 4.5 AEROSOL RESPIRATORY (INHALATION) at 18:16

## 2019-03-10 RX ADMIN — BUDESONIDE AND FORMOTEROL FUMARATE DIHYDRATE 1 PUFF: 80; 4.5 AEROSOL RESPIRATORY (INHALATION) at 05:49

## 2019-03-10 ASSESSMENT — ENCOUNTER SYMPTOMS
NAUSEA: 0
TINGLING: 0
NECK PAIN: 0
FEVER: 0
EYE PAIN: 0
SHORTNESS OF BREATH: 0
INSOMNIA: 0
BACK PAIN: 0
COUGH: 0
PALPITATIONS: 0
CHILLS: 0
ABDOMINAL PAIN: 0
SORE THROAT: 0
DEPRESSION: 0
DIZZINESS: 0
BLURRED VISION: 0

## 2019-03-10 NOTE — DISCHARGE PLANNING
Received Choice form at 0800  Agency/Facility Name: [facility]  Referral sent per Choice form @ 0805

## 2019-03-10 NOTE — DISCHARGE SUMMARY
Discharge Summary    CHIEF COMPLAINT ON ADMISSION  Chief Complaint   Patient presents with   • T-5000 FALL   • Rib Injury       Reason for Admission  Fall, rib pain     CODE STATUS  DNAR/DNI    HPI & HOSPITAL COURSE  This is a 89 y.o. male here with a fall at home and chest pain.      He has been found to have multiple rib fractures after a fall at home. This was complicated by acute respiratory failure with hypoxia. He was admitted for pain control and respiratory therapy.  He Had an uncomplicated hospital course but will require additional therapy at a SNF after discharge.     Therefore, he is discharged in good and stable condition to home with close outpatient follow-up.    The patient met 2-midnight criteria for an inpatient stay at the time of discharge.      FOLLOW UP ITEMS POST DISCHARGE  none    DISCHARGE DIAGNOSES  Active Problems:    Acute respiratory failure with hypoxia (HCC) POA: Yes    Chronic systolic congestive heart failure (HCC) POA: Yes    HTN (hypertension) POA: Yes    Rib fractures POA: Yes    Thrombocytopenia (HCC) POA: Yes    COPD (chronic obstructive pulmonary disease) (HCC) POA: Unknown  Resolved Problems:    * No resolved hospital problems. *      FOLLOW UP  Future Appointments  Date Time Provider Department Center   4/23/2019 2:40 PM VIDYA Araya None     No follow-up provider specified.    MEDICATIONS ON DISCHARGE     Medication List      CONTINUE taking these medications      Instructions   alendronate 10 MG Tabs  Commonly known as:  FOSAMAX   Take 1 Tab by mouth every morning before breakfast.  Dose:  10 mg     atorvastatin 10 MG Tabs  Commonly known as:  LIPITOR   Take 1 Tab by mouth every bedtime.  Dose:  10 mg     carvedilol 6.25 MG Tabs  Commonly known as:  COREG   Take 1 Tab by mouth 2 times a day, with meals.  Dose:  6.25 mg     enalapril 10 MG Tabs  Commonly known as:  VASOTEC   Take 1 Tab by mouth 2 times a day.  Dose:  10 mg     furosemide 20 MG  Tabs  Commonly known as:  LASIX   Take 1 Tab by mouth every day.  Dose:  20 mg     potassium chloride SA 10 MEQ Tbcr  Commonly known as:  K-DUR   Take 2 Tabs by mouth every day.  Dose:  20 mEq     SYMBICORT 80-4.5 MCG/ACT Aero  Generic drug:  budesonide-formoterol   Inhale 1 Puff by mouth 2 Times a Day.  Dose:  1 Puff            Allergies  Allergies   Allergen Reactions   • Nkda [No Known Drug Allergy]        DIET  Orders Placed This Encounter   Procedures   • Diet Order Regular     Standing Status:   Standing     Number of Occurrences:   1     Order Specific Question:   Diet:     Answer:   Regular [1]       ACTIVITY  As tolerated.  Weight bearing as tolerated    LINES, DRAINS, AND WOUNDS  This is an automated list. Peripheral IVs will be removed prior to discharge.  PIV Group Left Hand 20g (Active)     Indwelling Catheter Group Indwelling Catheter 16 fr (Active)      Surgical Wound Group Right Hip Incision (Active)              Indwelling Catheter Group Indwelling Catheter 16 fr (Active)        MENTAL STATUS ON TRANSFER  Level of Consciousness: Alert  Orientation : Oriented x 4       CONSULTATIONS  none    PROCEDURES  none    LABORATORY  Lab Results   Component Value Date    SODIUM 132 (L) 03/10/2019    POTASSIUM 4.3 03/10/2019    CHLORIDE 97 03/10/2019    CO2 27 03/10/2019    GLUCOSE 103 (H) 03/10/2019    BUN 35 (H) 03/10/2019    CREATININE 1.39 03/10/2019    CREATININE 1.2 03/25/2008        Lab Results   Component Value Date    WBC 9.1 03/08/2019    HEMOGLOBIN 11.4 (L) 03/08/2019    HEMATOCRIT 36.2 (L) 03/08/2019    PLATELETCT 107 (L) 03/08/2019        Total time of the discharge process exceeds 35 minutes.

## 2019-03-10 NOTE — PROGRESS NOTES
Hospital Medicine Daily Progress Note    Date of Service  3/10/2019    Chief Complaint  89 y.o. male admitted 3/7/2019 with chest pain.     Hospital Course    He has been found to have multiple rib fractures after a fall at home. This was complicated by acute respiratory failure with hypoxia. He was admitted for pain control and respiratory therapy.       Interval Problem Update  3/8- Having a lot of pain with movement and breathing. We discussed breathing deep and I provided him with an incentive spirometer.   3/9- still hypoxic. Pain is a little better and he is able to ambulate.   3/10- I added fluids for worsening azotemia today. RN asking for ibuprofen and ultram for pain which also was added. He is otherwise stable. I discussed plan with he and his son at the bedside.       I reviewed his imaging.   DX-ELBOW-COMPLETE 3+ RIGHT   Final Result       1.  Limited study due to patient positioning. No definite fracture seen.       2.  Degenerative change.       3.  Likely intra-articular ossific loose bodies.     CT-CHEST (THORAX) W/O   Final Result       Fractures of the right third, fourth, fifth, sixth ribs with overlying soft tissue edema in the chest wall.       No pulmonary contusion or pneumothorax is identified.       Atherosclerotic plaque including coronary artery calcification.       Emphysematous changes.       Bilateral renal cysts.       Cholelithiasis.       Multiple compression fractures in the thoracic and lumbar spine. These may be chronic.       Demineralization.       Sequelae of prior granulomatous exposure.     Echocardiography Laboratory    CONCLUSIONS  Mild concentric left ventricular hypertrophy.  Mildly reduced left ventricular systolic function.  Left ventricular ejection fraction is visually estimated to be 45%.  Indeterminate diastolic function.  Normal inferior vena cava size and inspiratory collapse.  Aortic sclerosis without stenosis.  Moderate aortic insufficiency.  Estimated right  ventricular systolic pressure  is 50 mmHg.      Consultants/Specialty  none    Code Status  DNR    Disposition  Pt/ot consulted for consideration of placement.     Review of Systems  Review of Systems   Constitutional: Negative for chills and fever.   HENT: Negative for sore throat.    Eyes: Negative for blurred vision and pain.   Respiratory: Negative for cough and shortness of breath.    Cardiovascular: Positive for chest pain. Negative for palpitations.   Gastrointestinal: Negative for abdominal pain and nausea.   Genitourinary: Negative for dysuria and urgency.   Musculoskeletal: Negative for back pain and neck pain.   Skin: Negative for itching and rash.   Neurological: Negative for dizziness and tingling.   Psychiatric/Behavioral: Negative for depression. The patient does not have insomnia.    All other systems reviewed and are negative.       Physical Exam  Temp:  [36.4 °C (97.5 °F)-37 °C (98.6 °F)] 36.4 °C (97.5 °F)  Pulse:  [67-83] 68  Resp:  [18] 18  BP: ()/(44-63) 119/44  SpO2:  [96 %-100 %] 100 %    Physical Exam   Constitutional: He is oriented to person, place, and time. He appears well-developed and well-nourished. No distress.   Patient seen and examined  Plan discussed with RN   HENT:   Mouth/Throat: Oropharynx is clear and moist. No oropharyngeal exudate.   Eyes: Conjunctivae and EOM are normal.   Neck: No JVD present. No tracheal deviation present.   Cardiovascular: Normal rate, normal heart sounds and intact distal pulses.    No murmur heard.  Cap refill 2sec  Pulses 2+ throughout     Pulmonary/Chest: Effort normal and breath sounds normal. No respiratory distress. He has no rales.   Decreased bs throughout.    Abdominal: Soft. Bowel sounds are normal. He exhibits no distension. There is no tenderness.   Musculoskeletal: He exhibits tenderness. He exhibits no edema.   Neurological: He is alert and oriented to person, place, and time.   Skin: Skin is warm and dry. He is not diaphoretic. No  erythema.   Normal skin color   Psychiatric: He has a normal mood and affect. His behavior is normal.   Nursing note and vitals reviewed.      Fluids    Intake/Output Summary (Last 24 hours) at 03/10/19 0915  Last data filed at 03/10/19 0400   Gross per 24 hour   Intake              450 ml   Output              200 ml   Net              250 ml       Laboratory  Recent Labs      03/07/19   1736  03/08/19   0509   WBC  12.7*  9.1   RBC  3.88*  3.35*   HEMOGLOBIN  12.9*  11.4*   HEMATOCRIT  40.2*  36.2*   MCV  103.6*  108.1*   MCH  33.2*  34.0*   MCHC  32.1*  31.5*   RDW  50.4*  53.0*   PLATELETCT  98*  107*   MPV  9.9  10.5     Recent Labs      03/08/19   0509  03/09/19   0442  03/10/19   0501   SODIUM  136  130*  132*   POTASSIUM  4.1  4.0  4.3   CHLORIDE  101  97  97   CO2  26  27  27   GLUCOSE  108*  92  103*   BUN  28*  28*  35*   CREATININE  1.03  1.01  1.39   CALCIUM  8.3*  7.9*  8.1*     Recent Labs      03/07/19   1736   APTT  23.6*   INR  1.09               Imaging  DX-CHEST-PORTABLE (1 VIEW)   Final Result      1.  Emphysema. No focal consolidation or pleural effusions.   2.  Unchanged fractures of right upper ribs.      DX-ELBOW-COMPLETE 3+ RIGHT   Final Result      1.  Limited study due to patient positioning. No definite fracture seen.      2.  Degenerative change.      3.  Likely intra-articular ossific loose bodies.      CT-CHEST (THORAX) W/O   Final Result      Fractures of the right third, fourth, fifth, sixth ribs with overlying soft tissue edema in the chest wall.      No pulmonary contusion or pneumothorax is identified.      Atherosclerotic plaque including coronary artery calcification.      Emphysematous changes.      Bilateral renal cysts.      Cholelithiasis.      Multiple compression fractures in the thoracic and lumbar spine. These may be chronic.      Demineralization.      Sequelae of prior granulomatous exposure.           Assessment/Plan  Acute respiratory failure with hypoxia (HCC)-  (present on admission)   Assessment & Plan    -Due to rib fractures and atelectasis. ocmplicated by severe copd.   Not improved.   -continue oxygen  Incentive spirometer.   He remains at High risk for complications with this situation of multiple rib fractures at his age. High risk for pneumonia.       COPD (chronic obstructive pulmonary disease) (HCC)   Assessment & Plan    ?chronic hypoxia from this as well?  No flare  Rt protocol     Thrombocytopenia (HCC)- (present on admission)   Assessment & Plan    -No sign of gross bleeding       Rib fractures- (present on admission)   Assessment & Plan    -Multiple fractures on the right post fall  -Causing atelectasis, give incentive spirometry  -Pain management  -Not causing pneumothorax or pulmonary contusion  Very high risk for complication  Incentive spirometry  Repeat cxr tomorrow       HTN (hypertension)- (present on admission)   Assessment & Plan    -Continue home Coreg and enalapril     Chronic systolic congestive heart failure (HCC)- (present on admission)   Assessment & Plan    -no acute exacerbation          VTE prophylaxis: scd

## 2019-03-10 NOTE — DISCHARGE PLANNING
"Anticipated Discharge Disposition: SNF    Action: SW met with this patient bedside to complete assessment and discuss SNF choice.  Patient requested that this SW call his son Moi to make the SNF choice.  SW called Moi and was informed that [facility] and they would like him to return there if possible.  Completed choice form was faxed to [facility] for referral follow up.     Barriers to Discharge: accepting snf    Plan: SW/CM to be available to assist as needed.       Care Transition Team Assessment    Information Source  Orientation : Oriented x 4  Information Given By: Patient  Informant's Name: Jhon  Who is responsible for making decisions for patient? : Patient    Elopement Risk  Legal Hold: No  Ambulatory or Self Mobile in Wheelchair: Yes  Disoriented: No  Psychiatric Symptoms: None  History of Wandering: No  Elopement this Admit: No  Vocalizing Wanting to Leave: No  Displays Behaviors, Body Language Wanting to Leave: No-Not at Risk for Elopement  Elopement Risk: Not at Risk for Elopement    Interdisciplinary Discharge Planning  Does Admitting Nurse Feel This Could be a Complex Discharge?: No  Lives with - Patient's Self Care Capacity: Adult Children  Patient or legal guardian wants to designate a caregiver (see row info): No  Support Systems: Children  Housing / Facility: Mobile Home  Do You Take your Prescribed Medications Regularly: No  Reasons Why Not Taking Medications :  (Pt states' they don't do anything\")  Able to Return to Previous ADL's: Future Time w/Therapy  Mobility Issues: Yes  Prior Services: Intermittent Physical Support for ADL Per Family  Assistance Needed: Unknown at this Time  Durable Medical Equipment: Not Applicable    Discharge Preparedness  What is your plan after discharge?: Skilled nursing facility  Prior Functional Level: Independent with Activities of Daily Living    Functional Assesment  Prior Functional Level: Independent with Activities of Daily " Living    Finances  Financial Barriers to Discharge: No  Prescription Coverage: Yes    Vision / Hearing Impairment  Vision Impairment : Yes  Right Eye Vision: Wears Glasses  Left Eye Vision: Wears Glasses  Hearing Impairment : No              Domestic Abuse  Have you ever been the victim of abuse or violence?: No  Physical Abuse or Sexual Abuse: No  Verbal Abuse or Emotional Abuse: No  Possible Abuse Reported to:: Not Applicable         Discharge Risks or Barriers  Discharge risks or barriers?: No    Anticipated Discharge Information  Anticipated discharge disposition: SNF

## 2019-03-10 NOTE — THERAPY
Pt refused physical therapy intervention stating that he is hurting and would like to stay in bed. Pt education regarding importance of OOB mobility to prevent further decline in function as well as mitigate effects of prolonged bed rest. RN made aware. MA

## 2019-03-11 VITALS
TEMPERATURE: 98.1 F | RESPIRATION RATE: 24 BRPM | HEART RATE: 75 BPM | WEIGHT: 132.28 LBS | OXYGEN SATURATION: 98 % | SYSTOLIC BLOOD PRESSURE: 105 MMHG | BODY MASS INDEX: 20.76 KG/M2 | HEIGHT: 67 IN | DIASTOLIC BLOOD PRESSURE: 57 MMHG

## 2019-03-11 LAB
ANION GAP SERPL CALC-SCNC: 7 MMOL/L (ref 0–11.9)
BUN SERPL-MCNC: 31 MG/DL (ref 8–22)
CALCIUM SERPL-MCNC: 8.1 MG/DL (ref 8.4–10.2)
CHLORIDE SERPL-SCNC: 97 MMOL/L (ref 96–112)
CO2 SERPL-SCNC: 26 MMOL/L (ref 20–33)
CREAT SERPL-MCNC: 1.12 MG/DL (ref 0.5–1.4)
GLUCOSE SERPL-MCNC: 104 MG/DL (ref 65–99)
POTASSIUM SERPL-SCNC: 4.5 MMOL/L (ref 3.6–5.5)
SODIUM SERPL-SCNC: 130 MMOL/L (ref 135–145)

## 2019-03-11 PROCEDURE — 99239 HOSP IP/OBS DSCHRG MGMT >30: CPT | Performed by: HOSPITALIST

## 2019-03-11 PROCEDURE — A9270 NON-COVERED ITEM OR SERVICE: HCPCS | Performed by: INTERNAL MEDICINE

## 2019-03-11 PROCEDURE — 80048 BASIC METABOLIC PNL TOTAL CA: CPT

## 2019-03-11 PROCEDURE — 700102 HCHG RX REV CODE 250 W/ 637 OVERRIDE(OP): Performed by: INTERNAL MEDICINE

## 2019-03-11 PROCEDURE — 36415 COLL VENOUS BLD VENIPUNCTURE: CPT

## 2019-03-11 RX ADMIN — BUDESONIDE AND FORMOTEROL FUMARATE DIHYDRATE 1 PUFF: 80; 4.5 AEROSOL RESPIRATORY (INHALATION) at 06:02

## 2019-03-11 RX ADMIN — POTASSIUM CHLORIDE 20 MEQ: 1500 TABLET, EXTENDED RELEASE ORAL at 06:02

## 2019-03-11 RX ADMIN — ALENDRONATE SODIUM 10 MG: 10 TABLET ORAL at 10:40

## 2019-03-11 RX ADMIN — CARVEDILOL 6.25 MG: 6.25 TABLET, FILM COATED ORAL at 08:56

## 2019-03-11 RX ADMIN — FUROSEMIDE 20 MG: 40 TABLET ORAL at 06:02

## 2019-03-11 NOTE — DISCHARGE PLANNING
Received Transport Form @ 1219  Spoke to Sridevi @ [facility]    Transport is scheduled for 3/11 @1500 going to [address] [place] [address] Ln. Jonn BARRY.

## 2019-03-11 NOTE — RESPIRATORY CARE
COPD EDUCATION by COPD CLINICAL EDUCATOR  3/11/2019 at 10:30 AM by Radha Tao     Patient reviewed by COPD education team. Patient does not qualify for COPD program.

## 2019-03-11 NOTE — PROGRESS NOTES
Patient has removed a total of four IV's at this point in time, despite education/teaching. Spoke with Dr. Vásquez via phone, order obtained to leave out IV access.

## 2019-03-11 NOTE — CARE PLAN
Problem: Nutritional:  Goal: Achieve adequate nutritional intake  Patient will consume >50% of meals   Outcome: MET Date Met: 03/11/19  PO >50% of most meals.

## 2019-03-11 NOTE — DISCHARGE PLANNING
Agency/Facility Name: [facility]  Spoke To: Minerva  Outcome: Needed to have referral faxed over. Done

## 2019-03-11 NOTE — DISCHARGE PLANNING
Care Transition Team Discharge Planning    Anticipated Discharge Information  Anticipated discharge disposition: SNF     3/11/19  Received call from Minerva at [facility]. She indicated that she can accept patient today and will provide transport at 1500. Informed patient and charge nurse. Also phoned patient's son Moi at [phone] to notify of transport time. Will assemble packet and give to nurse.          Discharge Plan:  [facility] Jonn

## 2019-03-11 NOTE — CARE PLAN
Problem: Safety  Goal: Will remain free from injury  Outcome: PROGRESSING AS EXPECTED  Instructed patient on the use of call light. Instructed patient to call RN to help when ambulating.      Problem: Knowledge Deficit  Goal: Knowledge of the prescribed therapeutic regimen will improve  Outcome: PROGRESSING AS EXPECTED  Updated patient on the current plan for care. Patient has no questions at this time. Will continue to monitor.

## 2019-03-11 NOTE — DISCHARGE PLANNING
Care Transition Team Discharge Planning    Anticipated Discharge Information  Anticipated discharge disposition: SNF    3/11/19  Received call from [facility] who stated that referral was not received. Resent by [facility].            Discharge Plan:  SNF

## 2019-03-11 NOTE — DISCHARGE PLANNING
Care Transition Team Discharge Planning    Anticipated Discharge Information  Anticipated discharge disposition: SNF     3/11/19  Received call from Minerva at [facility] who stated that they can accept patient today pending bed availability. Minerva to phone back when bed is available and if they can transport patient.       Discharge Plan:  [facility].

## 2019-03-11 NOTE — CARE PLAN
Problem: Knowledge Deficit  Goal: Knowledge of disease process/condition, treatment plan, diagnostic tests, and medications will improve    Intervention: Explain information regarding disease process/condition, treatment plan, diagnostic tests, and medications and document in education  hospitalist into see patient and discussed with son will transfer to SNF once bed available.enc po snacks and enc fluid intake, and bed alarm on at all times as pt disorientated and impulsive attempts to get out of bed to urgently void. C/o rib cage pain with movement using IS q 1 hour.

## 2019-03-11 NOTE — PROGRESS NOTES
Pt resting in bed. Awake. A/o x 3, unsure of date. States pain in right shoulder. Refuses offer for pain medication at this time. Cont/inc of urine. Bed bath by CNA. Pt ate breakfast but refused lunch.

## 2019-04-10 ENCOUNTER — PATIENT OUTREACH (OUTPATIENT)
Dept: HEALTH INFORMATION MANAGEMENT | Facility: OTHER | Age: 84
End: 2019-04-10

## 2019-04-19 ENCOUNTER — HOME HEALTH ADMISSION (OUTPATIENT)
Dept: HOME HEALTH SERVICES | Facility: HOME HEALTHCARE | Age: 84
End: 2019-04-19
Payer: MEDICARE

## 2019-04-22 ENCOUNTER — HOME CARE VISIT (OUTPATIENT)
Dept: HOME HEALTH SERVICES | Facility: HOME HEALTHCARE | Age: 84
End: 2019-04-22
Payer: MEDICARE

## 2019-04-22 PROCEDURE — 665001 SOC-HOME HEALTH

## 2019-04-22 PROCEDURE — G0493 RN CARE EA 15 MIN HH/HOSPICE: HCPCS

## 2019-04-22 SDOH — ECONOMIC STABILITY: HOUSING INSECURITY
HOME SAFETY: RTANT TO PLACE ONE. PATIENT DOES HAVE A WORKING FIRE EXTINGUISHER PRESENT IN THE HOME. SMOKE ALARMS ARE NOT PRESENT AND FUNCTIONAL ON EACH LEVEL OF THE HOME., INSTRUCTED PATIENT/CAREGIVER TO GET ONE AS SOON AS POSSIBLE. PATIENT DOES HAVE A FIRE ESCAP

## 2019-04-22 SDOH — ECONOMIC STABILITY: HOUSING INSECURITY
HOME SAFETY: E PLAN DEVELOPED. PATIENT DOES NOT HAVE FLAMMABLE MATERIALS PRESENT IN THE HOME PRESENTING A FIRE HAZARD. NO EVIDENCE FOUND OF SMOKING MATERIALS PRESENT IN THE HOME.

## 2019-04-22 SDOH — ECONOMIC STABILITY: HOUSING INSECURITY
HOME SAFETY: PT HAS THROW RUGS THAT CREATES A POTENTIAL RISK AS A TRIP/SLIP HAZARD.    OXYGEN SAFETY RISK ASSESSMENT PERFORMED. PATIENT DOES NOT HAVE A NO SMOKING SIGN POSTED IN THE HOME., PT WAS GIVEN A NO SMOKING SIGN AND PROVIDED EDUCATION ABOUT WHY IT IS IMPO

## 2019-04-22 SDOH — ECONOMIC STABILITY: HOUSING INSECURITY: EVIDENCE OF SMOKING MATERIAL: 0

## 2019-04-22 ASSESSMENT — ENCOUNTER SYMPTOMS
VOMITING: DENIES
NAUSEA: DENIES

## 2019-04-22 ASSESSMENT — PATIENT HEALTH QUESTIONNAIRE - PHQ9
1. LITTLE INTEREST OR PLEASURE IN DOING THINGS: 00
2. FEELING DOWN, DEPRESSED, IRRITABLE, OR HOPELESS: 00

## 2019-04-22 ASSESSMENT — ACTIVITIES OF DAILY LIVING (ADL): OASIS_M1830: 03

## 2019-04-23 ENCOUNTER — TELEPHONE (OUTPATIENT)
Dept: VASCULAR LAB | Facility: MEDICAL CENTER | Age: 84
End: 2019-04-23

## 2019-04-23 VITALS
OXYGEN SATURATION: 96 % | HEART RATE: 68 BPM | RESPIRATION RATE: 20 BRPM | WEIGHT: 129.8 LBS | SYSTOLIC BLOOD PRESSURE: 126 MMHG | DIASTOLIC BLOOD PRESSURE: 76 MMHG | BODY MASS INDEX: 20.37 KG/M2 | TEMPERATURE: 97.7 F | HEIGHT: 67 IN

## 2019-04-23 ASSESSMENT — ENCOUNTER SYMPTOMS: POOR JUDGMENT: 1

## 2019-04-23 NOTE — TELEPHONE ENCOUNTER
Medications reviewed  No clinically significant interactions          Bebe Riggs, Clinical Pharmacist

## 2019-04-24 ENCOUNTER — HOME CARE VISIT (OUTPATIENT)
Dept: HOME HEALTH SERVICES | Facility: HOME HEALTHCARE | Age: 84
End: 2019-04-24
Payer: MEDICARE

## 2019-04-24 VITALS
DIASTOLIC BLOOD PRESSURE: 60 MMHG | RESPIRATION RATE: 20 BRPM | OXYGEN SATURATION: 90 % | SYSTOLIC BLOOD PRESSURE: 122 MMHG | HEART RATE: 64 BPM | TEMPERATURE: 98.5 F

## 2019-04-24 VITALS
HEART RATE: 65 BPM | OXYGEN SATURATION: 94 % | DIASTOLIC BLOOD PRESSURE: 78 MMHG | RESPIRATION RATE: 20 BRPM | TEMPERATURE: 98.6 F | SYSTOLIC BLOOD PRESSURE: 124 MMHG

## 2019-04-24 PROCEDURE — G0152 HHCP-SERV OF OT,EA 15 MIN: HCPCS

## 2019-04-24 PROCEDURE — G0495 RN CARE TRAIN/EDU IN HH: HCPCS

## 2019-04-24 ASSESSMENT — ACTIVITIES OF DAILY LIVING (ADL)
LAUNDRY_ASSISTANCE: 6
TOILETING_EQUIPMENT_NEEDED: RISER
DRESSING_UB_ASSISTANCE: 0
HOUSEKEEPING_ASSISTANCE: 6
TOILETING_ASSISTANCE: 0
DRESSING_LB_ASSISTANCE: 0
MEAL_PREP_ASSISTANCE: 6
EATING_ASSISTANCE: 1
SHOPPING_ASSISTANCE: 6
TELEPHONE_ASSISTANCE: 2
TRANSPORTATION_ASSISTANCE: 6

## 2019-04-24 ASSESSMENT — ENCOUNTER SYMPTOMS
POOR JUDGMENT: 1
POOR JUDGMENT: 1

## 2019-04-25 ENCOUNTER — HOME CARE VISIT (OUTPATIENT)
Dept: HOME HEALTH SERVICES | Facility: HOME HEALTHCARE | Age: 84
End: 2019-04-25
Payer: MEDICARE

## 2019-04-25 VITALS
SYSTOLIC BLOOD PRESSURE: 135 MMHG | TEMPERATURE: 97.9 F | OXYGEN SATURATION: 94 % | DIASTOLIC BLOOD PRESSURE: 70 MMHG | RESPIRATION RATE: 20 BRPM | HEART RATE: 68 BPM

## 2019-04-25 PROCEDURE — G0151 HHCP-SERV OF PT,EA 15 MIN: HCPCS

## 2019-04-25 SDOH — ECONOMIC STABILITY: HOUSING INSECURITY: EVIDENCE OF SMOKING MATERIAL: 0

## 2019-04-25 ASSESSMENT — ENCOUNTER SYMPTOMS
SEVERE DYSPNEA: 1
POOR JUDGMENT: 1

## 2019-04-25 ASSESSMENT — ACTIVITIES OF DAILY LIVING (ADL)
ADLS_COMMENTS: <!--EPICS-->PLEASE REFER TO OT EVALUATION<!--EPICE-->
IADLS_COMMENTS: <!--EPICS-->PLEASE REFER TO OT EVALUATION<BR><!--EPICE-->

## 2019-04-29 ENCOUNTER — HOME CARE VISIT (OUTPATIENT)
Dept: HOME HEALTH SERVICES | Facility: HOME HEALTHCARE | Age: 84
End: 2019-04-29
Payer: MEDICARE

## 2019-04-29 ENCOUNTER — PATIENT OUTREACH (OUTPATIENT)
Dept: HEALTH INFORMATION MANAGEMENT | Facility: OTHER | Age: 84
End: 2019-04-29

## 2019-04-29 VITALS
RESPIRATION RATE: 22 BRPM | HEART RATE: 68 BPM | DIASTOLIC BLOOD PRESSURE: 68 MMHG | SYSTOLIC BLOOD PRESSURE: 118 MMHG | OXYGEN SATURATION: 93 % | TEMPERATURE: 98.8 F

## 2019-04-29 PROCEDURE — G0152 HHCP-SERV OF OT,EA 15 MIN: HCPCS

## 2019-04-29 PROCEDURE — G0495 RN CARE TRAIN/EDU IN HH: HCPCS

## 2019-04-29 ASSESSMENT — ENCOUNTER SYMPTOMS
POOR JUDGMENT: 1
DEBILITATING PAIN: 1
LOSS OF SENSATION IN FEET: 0
DEPRESSION: 0
OCCASIONAL FEELINGS OF UNSTEADINESS: 1

## 2019-04-29 NOTE — PROGRESS NOTES
"Transitional Care Navigator:    Situation    Initial post SNF discharge call. Pt received therrapies at [facility] from 3/1-4/21/19 after a glf that resulted in rib fracures.   Background       Pt lives in a private residence with his son, although his son works during the day.   Assessment    The patient has been receiving home health support; he states that he is doing well, and that HH will continue to see hime 'for a while\".   Recommendation Pt is agreeable to TCN contacting him again.     TCN will continue to follow.       "

## 2019-04-30 VITALS
OXYGEN SATURATION: 98 % | HEART RATE: 68 BPM | DIASTOLIC BLOOD PRESSURE: 70 MMHG | SYSTOLIC BLOOD PRESSURE: 128 MMHG | RESPIRATION RATE: 20 BRPM | TEMPERATURE: 98.9 F

## 2019-04-30 SDOH — ECONOMIC STABILITY: HOUSING INSECURITY: EVIDENCE OF SMOKING MATERIAL: 0

## 2019-04-30 ASSESSMENT — ENCOUNTER SYMPTOMS: SHORTNESS OF BREATH: T

## 2019-05-01 ENCOUNTER — HOME CARE VISIT (OUTPATIENT)
Dept: HOME HEALTH SERVICES | Facility: HOME HEALTHCARE | Age: 84
End: 2019-05-01
Payer: MEDICARE

## 2019-05-02 ENCOUNTER — HOME CARE VISIT (OUTPATIENT)
Dept: HOME HEALTH SERVICES | Facility: HOME HEALTHCARE | Age: 84
End: 2019-05-02
Payer: MEDICARE

## 2019-05-02 VITALS
RESPIRATION RATE: 18 BRPM | TEMPERATURE: 97.2 F | OXYGEN SATURATION: 100 % | SYSTOLIC BLOOD PRESSURE: 128 MMHG | HEART RATE: 68 BPM | DIASTOLIC BLOOD PRESSURE: 64 MMHG

## 2019-05-02 PROCEDURE — G0495 RN CARE TRAIN/EDU IN HH: HCPCS

## 2019-05-02 ASSESSMENT — ENCOUNTER SYMPTOMS: SEVERE DYSPNEA: 1

## 2019-05-03 ENCOUNTER — HOME CARE VISIT (OUTPATIENT)
Dept: HOME HEALTH SERVICES | Facility: HOME HEALTHCARE | Age: 84
End: 2019-05-03
Payer: MEDICARE

## 2019-05-06 ENCOUNTER — HOME CARE VISIT (OUTPATIENT)
Dept: HOME HEALTH SERVICES | Facility: HOME HEALTHCARE | Age: 84
End: 2019-05-06
Payer: MEDICARE

## 2019-05-06 VITALS
OXYGEN SATURATION: 97 % | RESPIRATION RATE: 18 BRPM | TEMPERATURE: 97.4 F | DIASTOLIC BLOOD PRESSURE: 70 MMHG | HEART RATE: 68 BPM | SYSTOLIC BLOOD PRESSURE: 125 MMHG

## 2019-05-06 DIAGNOSIS — I50.9 HEART FAILURE, NYHA CLASS 3 (HCC): ICD-10-CM

## 2019-05-06 DIAGNOSIS — I50.20 ACC/AHA STAGE C SYSTOLIC HEART FAILURE (HCC): ICD-10-CM

## 2019-05-06 PROCEDURE — G0151 HHCP-SERV OF PT,EA 15 MIN: HCPCS

## 2019-05-06 RX ORDER — ENALAPRIL MALEATE 10 MG/1
10 TABLET ORAL 2 TIMES DAILY
Qty: 200 TAB | Refills: 0 | Status: SHIPPED | OUTPATIENT
Start: 2019-05-06 | End: 2019-05-22

## 2019-05-06 ASSESSMENT — ENCOUNTER SYMPTOMS: POOR JUDGMENT: 1

## 2019-05-07 ENCOUNTER — HOME CARE VISIT (OUTPATIENT)
Dept: HOME HEALTH SERVICES | Facility: HOME HEALTHCARE | Age: 84
End: 2019-05-07
Payer: MEDICARE

## 2019-05-07 VITALS
DIASTOLIC BLOOD PRESSURE: 82 MMHG | OXYGEN SATURATION: 94 % | HEART RATE: 74 BPM | RESPIRATION RATE: 18 BRPM | SYSTOLIC BLOOD PRESSURE: 130 MMHG | TEMPERATURE: 99.3 F

## 2019-05-07 PROCEDURE — G0300 HHS/HOSPICE OF LPN EA 15 MIN: HCPCS

## 2019-05-07 ASSESSMENT — ENCOUNTER SYMPTOMS
SEVERE DYSPNEA: 1
NAUSEA: DENIES
VOMITING: DENIES

## 2019-05-08 ENCOUNTER — HOME CARE VISIT (OUTPATIENT)
Dept: HOME HEALTH SERVICES | Facility: HOME HEALTHCARE | Age: 84
End: 2019-05-08
Payer: MEDICARE

## 2019-05-08 VITALS
OXYGEN SATURATION: 95 % | DIASTOLIC BLOOD PRESSURE: 72 MMHG | RESPIRATION RATE: 16 BRPM | SYSTOLIC BLOOD PRESSURE: 118 MMHG | TEMPERATURE: 99.3 F | HEART RATE: 94 BPM

## 2019-05-08 PROCEDURE — G0152 HHCP-SERV OF OT,EA 15 MIN: HCPCS

## 2019-05-08 PROCEDURE — G0155 HHCP-SVS OF CSW,EA 15 MIN: HCPCS

## 2019-05-08 ASSESSMENT — SOCIAL DETERMINANTS OF HEALTH (SDOH): IS CONCERNED ABOUT INCOME: N

## 2019-05-08 ASSESSMENT — ENCOUNTER SYMPTOMS
DIFFICULTY THINKING: 1
POOR JUDGMENT: 1

## 2019-05-10 ENCOUNTER — HOME CARE VISIT (OUTPATIENT)
Dept: HOME HEALTH SERVICES | Facility: HOME HEALTHCARE | Age: 84
End: 2019-05-10
Payer: MEDICARE

## 2019-05-10 PROCEDURE — G0495 RN CARE TRAIN/EDU IN HH: HCPCS

## 2019-05-12 VITALS
OXYGEN SATURATION: 98 % | TEMPERATURE: 98.2 F | RESPIRATION RATE: 18 BRPM | SYSTOLIC BLOOD PRESSURE: 144 MMHG | DIASTOLIC BLOOD PRESSURE: 80 MMHG | HEART RATE: 78 BPM

## 2019-05-12 SDOH — ECONOMIC STABILITY: HOUSING INSECURITY: EVIDENCE OF SMOKING MATERIAL: 0

## 2019-05-16 ENCOUNTER — HOME CARE VISIT (OUTPATIENT)
Dept: HOME HEALTH SERVICES | Facility: HOME HEALTHCARE | Age: 84
End: 2019-05-16
Payer: MEDICARE

## 2019-05-16 PROCEDURE — G0495 RN CARE TRAIN/EDU IN HH: HCPCS

## 2019-05-17 VITALS
HEART RATE: 64 BPM | TEMPERATURE: 98.4 F | RESPIRATION RATE: 18 BRPM | DIASTOLIC BLOOD PRESSURE: 82 MMHG | SYSTOLIC BLOOD PRESSURE: 132 MMHG | OXYGEN SATURATION: 95 %

## 2019-05-18 SDOH — ECONOMIC STABILITY: HOUSING INSECURITY: EVIDENCE OF SMOKING MATERIAL: 0

## 2019-05-22 ENCOUNTER — HOME CARE VISIT (OUTPATIENT)
Dept: HOME HEALTH SERVICES | Facility: HOME HEALTHCARE | Age: 84
End: 2019-05-22
Payer: MEDICARE

## 2019-05-22 ENCOUNTER — APPOINTMENT (OUTPATIENT)
Dept: CARDIOLOGY | Facility: MEDICAL CENTER | Age: 84
DRG: 291 | End: 2019-05-22
Attending: INTERNAL MEDICINE
Payer: MEDICARE

## 2019-05-22 ENCOUNTER — APPOINTMENT (OUTPATIENT)
Dept: RADIOLOGY | Facility: MEDICAL CENTER | Age: 84
DRG: 291 | End: 2019-05-22
Attending: INTERNAL MEDICINE
Payer: MEDICARE

## 2019-05-22 ENCOUNTER — APPOINTMENT (OUTPATIENT)
Dept: RADIOLOGY | Facility: MEDICAL CENTER | Age: 84
DRG: 291 | End: 2019-05-22
Attending: EMERGENCY MEDICINE
Payer: MEDICARE

## 2019-05-22 ENCOUNTER — HOSPITAL ENCOUNTER (INPATIENT)
Facility: MEDICAL CENTER | Age: 84
LOS: 6 days | DRG: 291 | End: 2019-05-28
Attending: EMERGENCY MEDICINE | Admitting: INTERNAL MEDICINE
Payer: MEDICARE

## 2019-05-22 VITALS
RESPIRATION RATE: 24 BRPM | HEART RATE: 78 BPM | OXYGEN SATURATION: 83 % | SYSTOLIC BLOOD PRESSURE: 130 MMHG | DIASTOLIC BLOOD PRESSURE: 80 MMHG | TEMPERATURE: 98 F

## 2019-05-22 DIAGNOSIS — J44.1 ACUTE EXACERBATION OF CHRONIC OBSTRUCTIVE PULMONARY DISEASE (COPD) (HCC): ICD-10-CM

## 2019-05-22 DIAGNOSIS — R53.1 WEAKNESS: ICD-10-CM

## 2019-05-22 DIAGNOSIS — J81.0 ACUTE PULMONARY EDEMA (HCC): ICD-10-CM

## 2019-05-22 DIAGNOSIS — J96.21 ACUTE ON CHRONIC RESPIRATORY FAILURE WITH HYPOXEMIA (HCC): ICD-10-CM

## 2019-05-22 DIAGNOSIS — E87.79 OTHER HYPERVOLEMIA: ICD-10-CM

## 2019-05-22 DIAGNOSIS — J44.1 CHRONIC OBSTRUCTIVE PULMONARY DISEASE WITH ACUTE EXACERBATION (HCC): ICD-10-CM

## 2019-05-22 DIAGNOSIS — J96.01 ACUTE RESPIRATORY FAILURE WITH HYPOXIA (HCC): ICD-10-CM

## 2019-05-22 DIAGNOSIS — R06.02 SOB (SHORTNESS OF BREATH): ICD-10-CM

## 2019-05-22 DIAGNOSIS — I10 ESSENTIAL HYPERTENSION: ICD-10-CM

## 2019-05-22 PROBLEM — S22.49XA MULTIPLE RIB FRACTURES: Status: ACTIVE | Noted: 2019-05-22

## 2019-05-22 PROBLEM — E87.6 HYPOKALEMIA: Status: ACTIVE | Noted: 2019-05-22

## 2019-05-22 PROBLEM — R79.89 D-DIMER, ELEVATED: Status: ACTIVE | Noted: 2019-05-22

## 2019-05-22 LAB
ALBUMIN SERPL BCP-MCNC: 3.3 G/DL (ref 3.2–4.9)
ALBUMIN/GLOB SERPL: 1.1 G/DL
ALP SERPL-CCNC: 74 U/L (ref 30–99)
ALT SERPL-CCNC: 19 U/L (ref 2–50)
ANION GAP SERPL CALC-SCNC: 9 MMOL/L (ref 0–11.9)
ANISOCYTOSIS BLD QL SMEAR: ABNORMAL
APTT PPP: 24.8 SEC (ref 24.7–36)
AST SERPL-CCNC: 19 U/L (ref 12–45)
BASOPHILS # BLD AUTO: 0 % (ref 0–1.8)
BASOPHILS # BLD: 0 K/UL (ref 0–0.12)
BILIRUB SERPL-MCNC: 1.1 MG/DL (ref 0.1–1.5)
BNP SERPL-MCNC: 749 PG/ML (ref 0–100)
BUN SERPL-MCNC: 21 MG/DL (ref 8–22)
CALCIUM SERPL-MCNC: 8.4 MG/DL (ref 8.4–10.2)
CHLORIDE SERPL-SCNC: 93 MMOL/L (ref 96–112)
CO2 SERPL-SCNC: 28 MMOL/L (ref 20–33)
CREAT SERPL-MCNC: 0.94 MG/DL (ref 0.5–1.4)
D DIMER PPP IA.FEU-MCNC: 1.17 UG/ML (FEU) (ref 0–0.5)
EKG IMPRESSION: NORMAL
EOSINOPHIL # BLD AUTO: 0.27 K/UL (ref 0–0.51)
EOSINOPHIL NFR BLD: 3 % (ref 0–6.9)
ERYTHROCYTE [DISTWIDTH] IN BLOOD BY AUTOMATED COUNT: 48.6 FL (ref 35.9–50)
GLOBULIN SER CALC-MCNC: 3.1 G/DL (ref 1.9–3.5)
GLUCOSE SERPL-MCNC: 116 MG/DL (ref 65–99)
HCT VFR BLD AUTO: 39.5 % (ref 42–52)
HGB BLD-MCNC: 12.7 G/DL (ref 14–18)
INR PPP: 1.09 (ref 0.87–1.13)
LACTATE BLD-SCNC: 1.5 MMOL/L (ref 0.5–2)
LACTATE BLD-SCNC: 1.6 MMOL/L (ref 0.5–2)
LACTATE BLD-SCNC: 2.2 MMOL/L (ref 0.5–2)
LV EJECT FRACT  99904: 55
LV EJECT FRACT MOD 2C 99903: 41.87
LV EJECT FRACT MOD 4C 99902: 53.38
LV EJECT FRACT MOD BP 99901: 51.12
LYMPHOCYTES # BLD AUTO: 0.73 K/UL (ref 1–4.8)
LYMPHOCYTES NFR BLD: 8 % (ref 22–41)
MACROCYTES BLD QL SMEAR: ABNORMAL
MAGNESIUM SERPL-MCNC: 2 MG/DL (ref 1.5–2.5)
MANUAL DIFF BLD: NORMAL
MCH RBC QN AUTO: 32.2 PG (ref 27–33)
MCHC RBC AUTO-ENTMCNC: 32.2 G/DL (ref 33.7–35.3)
MCV RBC AUTO: 100 FL (ref 81.4–97.8)
MONOCYTES # BLD AUTO: 0 K/UL (ref 0–0.85)
MONOCYTES NFR BLD AUTO: 0 % (ref 0–13.4)
NEUTROPHILS # BLD AUTO: 8.1 K/UL (ref 1.82–7.42)
NEUTROPHILS NFR BLD: 89 % (ref 44–72)
NRBC # BLD AUTO: 0 K/UL
NRBC BLD-RTO: 0 /100 WBC
PLATELET # BLD AUTO: 210 K/UL (ref 164–446)
PLATELET BLD QL SMEAR: NORMAL
PMV BLD AUTO: 8.4 FL (ref 9–12.9)
POLYCHROMASIA BLD QL SMEAR: NORMAL
POTASSIUM SERPL-SCNC: 4.7 MMOL/L (ref 3.6–5.5)
PROT SERPL-MCNC: 6.4 G/DL (ref 6–8.2)
PROTHROMBIN TIME: 14 SEC (ref 12–14.6)
RBC # BLD AUTO: 3.95 M/UL (ref 4.7–6.1)
RBC BLD AUTO: PRESENT
SODIUM SERPL-SCNC: 130 MMOL/L (ref 135–145)
TROPONIN I SERPL-MCNC: 0.03 NG/ML (ref 0–0.04)
WBC # BLD AUTO: 9.1 K/UL (ref 4.8–10.8)

## 2019-05-22 PROCEDURE — 71275 CT ANGIOGRAPHY CHEST: CPT

## 2019-05-22 PROCEDURE — 85027 COMPLETE CBC AUTOMATED: CPT

## 2019-05-22 PROCEDURE — 93306 TTE W/DOPPLER COMPLETE: CPT

## 2019-05-22 PROCEDURE — 85379 FIBRIN DEGRADATION QUANT: CPT

## 2019-05-22 PROCEDURE — A9270 NON-COVERED ITEM OR SERVICE: HCPCS | Performed by: INTERNAL MEDICINE

## 2019-05-22 PROCEDURE — 83880 ASSAY OF NATRIURETIC PEPTIDE: CPT

## 2019-05-22 PROCEDURE — 700101 HCHG RX REV CODE 250: Performed by: INTERNAL MEDICINE

## 2019-05-22 PROCEDURE — 94640 AIRWAY INHALATION TREATMENT: CPT

## 2019-05-22 PROCEDURE — 71045 X-RAY EXAM CHEST 1 VIEW: CPT

## 2019-05-22 PROCEDURE — 93306 TTE W/DOPPLER COMPLETE: CPT | Mod: 26,GW | Performed by: INTERNAL MEDICINE

## 2019-05-22 PROCEDURE — 700117 HCHG RX CONTRAST REV CODE 255: Performed by: INTERNAL MEDICINE

## 2019-05-22 PROCEDURE — 85610 PROTHROMBIN TIME: CPT

## 2019-05-22 PROCEDURE — 94760 N-INVAS EAR/PLS OXIMETRY 1: CPT

## 2019-05-22 PROCEDURE — 99285 EMERGENCY DEPT VISIT HI MDM: CPT

## 2019-05-22 PROCEDURE — 84484 ASSAY OF TROPONIN QUANT: CPT

## 2019-05-22 PROCEDURE — 80053 COMPREHEN METABOLIC PANEL: CPT

## 2019-05-22 PROCEDURE — 99223 1ST HOSP IP/OBS HIGH 75: CPT | Mod: AI | Performed by: INTERNAL MEDICINE

## 2019-05-22 PROCEDURE — 93005 ELECTROCARDIOGRAM TRACING: CPT | Performed by: EMERGENCY MEDICINE

## 2019-05-22 PROCEDURE — 770020 HCHG ROOM/CARE - TELE (206)

## 2019-05-22 PROCEDURE — 96375 TX/PRO/DX INJ NEW DRUG ADDON: CPT

## 2019-05-22 PROCEDURE — 700102 HCHG RX REV CODE 250 W/ 637 OVERRIDE(OP): Performed by: INTERNAL MEDICINE

## 2019-05-22 PROCEDURE — 700111 HCHG RX REV CODE 636 W/ 250 OVERRIDE (IP): Performed by: EMERGENCY MEDICINE

## 2019-05-22 PROCEDURE — G0493 RN CARE EA 15 MIN HH/HOSPICE: HCPCS

## 2019-05-22 PROCEDURE — 96374 THER/PROPH/DIAG INJ IV PUSH: CPT

## 2019-05-22 PROCEDURE — 85730 THROMBOPLASTIN TIME PARTIAL: CPT

## 2019-05-22 PROCEDURE — 700101 HCHG RX REV CODE 250: Performed by: EMERGENCY MEDICINE

## 2019-05-22 PROCEDURE — 85007 BL SMEAR W/DIFF WBC COUNT: CPT

## 2019-05-22 PROCEDURE — 36415 COLL VENOUS BLD VENIPUNCTURE: CPT

## 2019-05-22 PROCEDURE — 83735 ASSAY OF MAGNESIUM: CPT

## 2019-05-22 PROCEDURE — 83605 ASSAY OF LACTIC ACID: CPT

## 2019-05-22 PROCEDURE — 87040 BLOOD CULTURE FOR BACTERIA: CPT

## 2019-05-22 PROCEDURE — 304561 HCHG STAT O2

## 2019-05-22 RX ORDER — METHYLPREDNISOLONE SODIUM SUCCINATE 125 MG/2ML
125 INJECTION, POWDER, LYOPHILIZED, FOR SOLUTION INTRAMUSCULAR; INTRAVENOUS ONCE
Status: COMPLETED | OUTPATIENT
Start: 2019-05-22 | End: 2019-05-22

## 2019-05-22 RX ORDER — ONDANSETRON 4 MG/1
4 TABLET, ORALLY DISINTEGRATING ORAL EVERY 4 HOURS PRN
Status: DISCONTINUED | OUTPATIENT
Start: 2019-05-22 | End: 2019-05-28 | Stop reason: HOSPADM

## 2019-05-22 RX ORDER — AMOXICILLIN 250 MG
2 CAPSULE ORAL 2 TIMES DAILY
Status: DISCONTINUED | OUTPATIENT
Start: 2019-05-22 | End: 2019-05-28 | Stop reason: HOSPADM

## 2019-05-22 RX ORDER — ALENDRONATE SODIUM 10 MG/1
10 TABLET ORAL EVERY MORNING
COMMUNITY
End: 2019-05-28

## 2019-05-22 RX ORDER — ACETAMINOPHEN 325 MG/1
650 TABLET ORAL EVERY 6 HOURS PRN
Status: DISCONTINUED | OUTPATIENT
Start: 2019-05-22 | End: 2019-05-28 | Stop reason: HOSPADM

## 2019-05-22 RX ORDER — IPRATROPIUM BROMIDE AND ALBUTEROL SULFATE 2.5; .5 MG/3ML; MG/3ML
3 SOLUTION RESPIRATORY (INHALATION)
Status: COMPLETED | OUTPATIENT
Start: 2019-05-22 | End: 2019-05-22

## 2019-05-22 RX ORDER — BUDESONIDE AND FORMOTEROL FUMARATE DIHYDRATE 160; 4.5 UG/1; UG/1
2 AEROSOL RESPIRATORY (INHALATION)
Status: DISCONTINUED | OUTPATIENT
Start: 2019-05-22 | End: 2019-05-25

## 2019-05-22 RX ORDER — BISACODYL 10 MG
10 SUPPOSITORY, RECTAL RECTAL
Status: DISCONTINUED | OUTPATIENT
Start: 2019-05-22 | End: 2019-05-28 | Stop reason: HOSPADM

## 2019-05-22 RX ORDER — FUROSEMIDE 10 MG/ML
40 INJECTION INTRAMUSCULAR; INTRAVENOUS ONCE
Status: COMPLETED | OUTPATIENT
Start: 2019-05-22 | End: 2019-05-22

## 2019-05-22 RX ORDER — ENALAPRIL MALEATE 10 MG/1
10 TABLET ORAL DAILY
Status: ON HOLD | COMMUNITY
End: 2020-01-24

## 2019-05-22 RX ORDER — ONDANSETRON 2 MG/ML
4 INJECTION INTRAMUSCULAR; INTRAVENOUS EVERY 4 HOURS PRN
Status: DISCONTINUED | OUTPATIENT
Start: 2019-05-22 | End: 2019-05-28 | Stop reason: HOSPADM

## 2019-05-22 RX ORDER — ENALAPRIL MALEATE 5 MG/1
10 TABLET ORAL 2 TIMES DAILY
Status: DISCONTINUED | OUTPATIENT
Start: 2019-05-22 | End: 2019-05-28 | Stop reason: HOSPADM

## 2019-05-22 RX ORDER — TIOTROPIUM BROMIDE 18 UG/1
1 CAPSULE ORAL; RESPIRATORY (INHALATION)
Status: CANCELLED | OUTPATIENT
Start: 2019-05-23

## 2019-05-22 RX ORDER — POLYETHYLENE GLYCOL 3350 17 G/17G
1 POWDER, FOR SOLUTION ORAL
Status: DISCONTINUED | OUTPATIENT
Start: 2019-05-22 | End: 2019-05-28 | Stop reason: HOSPADM

## 2019-05-22 RX ORDER — FUROSEMIDE 10 MG/ML
20 INJECTION INTRAMUSCULAR; INTRAVENOUS
Status: DISCONTINUED | OUTPATIENT
Start: 2019-05-22 | End: 2019-05-24

## 2019-05-22 RX ORDER — CARVEDILOL 6.25 MG/1
6.25 TABLET ORAL 2 TIMES DAILY
COMMUNITY
End: 2019-10-30

## 2019-05-22 RX ORDER — CARVEDILOL 6.25 MG/1
6.25 TABLET ORAL 2 TIMES DAILY WITH MEALS
Status: DISCONTINUED | OUTPATIENT
Start: 2019-05-22 | End: 2019-05-28 | Stop reason: HOSPADM

## 2019-05-22 RX ORDER — IPRATROPIUM BROMIDE AND ALBUTEROL SULFATE 2.5; .5 MG/3ML; MG/3ML
3 SOLUTION RESPIRATORY (INHALATION)
Status: DISCONTINUED | OUTPATIENT
Start: 2019-05-22 | End: 2019-05-24

## 2019-05-22 RX ORDER — PREDNISONE 20 MG/1
10 TABLET ORAL DAILY
Status: DISCONTINUED | OUTPATIENT
Start: 2019-05-22 | End: 2019-05-28 | Stop reason: HOSPADM

## 2019-05-22 RX ORDER — TIOTROPIUM BROMIDE 18 UG/1
1 CAPSULE ORAL; RESPIRATORY (INHALATION) DAILY
Status: DISCONTINUED | OUTPATIENT
Start: 2019-05-22 | End: 2019-05-22 | Stop reason: SDUPTHER

## 2019-05-22 RX ADMIN — FUROSEMIDE 40 MG: 10 INJECTION, SOLUTION INTRAVENOUS at 13:32

## 2019-05-22 RX ADMIN — TIOTROPIUM BROMIDE 1 CAPSULE: 18 CAPSULE ORAL; RESPIRATORY (INHALATION) at 15:27

## 2019-05-22 RX ADMIN — CARVEDILOL 6.25 MG: 6.25 TABLET, FILM COATED ORAL at 17:11

## 2019-05-22 RX ADMIN — METHYLPREDNISOLONE SODIUM SUCCINATE 125 MG: 125 INJECTION, POWDER, FOR SOLUTION INTRAMUSCULAR; INTRAVENOUS at 11:55

## 2019-05-22 RX ADMIN — IOHEXOL 60 ML: 350 INJECTION, SOLUTION INTRAVENOUS at 14:30

## 2019-05-22 RX ADMIN — ENALAPRIL MALEATE 10 MG: 5 TABLET ORAL at 17:11

## 2019-05-22 RX ADMIN — BUDESONIDE AND FORMOTEROL FUMARATE DIHYDRATE 2 PUFF: 160; 4.5 AEROSOL RESPIRATORY (INHALATION) at 19:25

## 2019-05-22 RX ADMIN — IPRATROPIUM BROMIDE AND ALBUTEROL SULFATE 3 ML: .5; 3 SOLUTION RESPIRATORY (INHALATION) at 12:00

## 2019-05-22 RX ADMIN — IPRATROPIUM BROMIDE AND ALBUTEROL SULFATE 3 ML: .5; 3 SOLUTION RESPIRATORY (INHALATION) at 15:27

## 2019-05-22 ASSESSMENT — COGNITIVE AND FUNCTIONAL STATUS - GENERAL
HELP NEEDED FOR BATHING: A LITTLE
MOBILITY SCORE: 20
DRESSING REGULAR LOWER BODY CLOTHING: A LITTLE
WALKING IN HOSPITAL ROOM: A LITTLE
DRESSING REGULAR UPPER BODY CLOTHING: A LITTLE
SUGGESTED CMS G CODE MODIFIER DAILY ACTIVITY: CK
SUGGESTED CMS G CODE MODIFIER MOBILITY: CJ
DAILY ACTIVITIY SCORE: 18
PERSONAL GROOMING: A LITTLE
TOILETING: A LITTLE
MOVING TO AND FROM BED TO CHAIR: A LITTLE
CLIMB 3 TO 5 STEPS WITH RAILING: A LITTLE
STANDING UP FROM CHAIR USING ARMS: A LITTLE
EATING MEALS: A LITTLE

## 2019-05-22 ASSESSMENT — ENCOUNTER SYMPTOMS
MENTAL STATUS CHANGE: 0
FALLS: 0
VOMITING: 0
CONSTIPATION: 0
SORE THROAT: 0
COUGH: 1
NAUSEA: 0
DIZZINESS: 0
HEADACHES: 0
SINUS PAIN: 0
SHORTNESS OF BREATH: T
SHORTNESS OF BREATH: 1
CHILLS: 0
ABDOMINAL PAIN: 0
NERVOUS/ANXIOUS: 0
SPEECH CHANGE: 0
SENSORY CHANGE: 0
DIARRHEA: 0
EYE REDNESS: 0
FEVER: 0
SPUTUM PRODUCTION: 0
SEVERE DYSPNEA: 1
EYE DISCHARGE: 0

## 2019-05-22 ASSESSMENT — LIFESTYLE VARIABLES
ALCOHOL_USE: NO
EVER_SMOKED: YES
EVER_SMOKED: YES

## 2019-05-22 NOTE — CARE PLAN
Problem: Safety  Goal: Will remain free from falls  Outcome: PROGRESSING AS EXPECTED  Call light within reach and bed alarm on. Pt knows to use call light before getting out of bed.     Problem: Knowledge Deficit  Goal: Knowledge of disease process/condition, treatment plan, diagnostic tests, and medications will improve  Outcome: PROGRESSING AS EXPECTED  Discussed plan of care with patient and family. All questions answered.

## 2019-05-22 NOTE — PROGRESS NOTES
Patient and family are unsure what medications the patient takes at home or what medications he took morning. I am holding all meds till the family comes back with a medication list.

## 2019-05-22 NOTE — ASSESSMENT & PLAN NOTE
Due to fluid overload, baseline 2L from COPD  D-dimer was elevated  CTA showed no clot however but multiple rib fractures which were present in March 2019.  PO lasix

## 2019-05-22 NOTE — ED NOTES
Med Rec updated and complete per pt home pharmacy  Allergies have been verified   No oral ABX within the last 30 days  Pt Home Pharmacy:[facility]

## 2019-05-22 NOTE — ASSESSMENT & PLAN NOTE
He has markedly diminished breath sounds unclear what his baseline is RT protocol has been ordered  There is no overt wheezing, continue home prednisone  Duo nebs  Symbicort  He wears 2 L of oxygen at home, continue

## 2019-05-22 NOTE — ASSESSMENT & PLAN NOTE
Present since March 2019 following fall  Patient has been declining since discharge from [facility] 3 weeks ago  PT pardeep

## 2019-05-22 NOTE — ED PROVIDER NOTES
ED Provider Note    CHIEF COMPLAINT  Chief Complaint   Patient presents with   • Shortness of Breath       HPI    Primary care provider: Ihsan Monet M.D.  Means of arrival: POV  History obtained from: patient, son  History limited by: nothing    Jhon Begum is a 89 y.o. male who presents with dyspnea.  This is been going on for several weeks but slightly worse over the last 2 days.  The patient has a complicated history of heart failure as well as emphysema.  He was hospitalized approximately 2 months ago and spent several weeks in assisted living, now back home with family for several weeks.  He was found to be hypoxic by home health worker and so they were instructed to come in.  He has been on 2 L nasal cannula.  Is been compliant with all medications.  Denies any chest pain or fevers or shaking chills or vomiting.  Many prior episodes, symptoms have steadily been worsening.  No relief with home inhalers.  No aggravating factors noted.  Family denies any weight gain.    REVIEW OF SYSTEMS  Constitutional: Negative for fever or chills.   HENT: Negative for rhinorrhea or sore throat.    Respiratory: Negative for cough but positive for dyspnea.  Cardiovascular: Negative for chest pain or palpitations.   Gastrointestinal: Negative for nausea, vomiting, abdominal pain.   Neurological: Negative for sensory or motor changes.   Endo/Heme/Allergies: Negative for weight changes or hives.   See HPI for further details. All other systems are negative.     PAST MEDICAL HISTORY   has a past medical history of ASTHMA; Congestive heart failure (HCC); Emphysema lung (HCC) (2008); and Pain.    PAST FAMILY HISTORY  Family History   Problem Relation Age of Onset   • Cancer Other        SOCIAL HISTORY  Social History     Social History Main Topics   • Smoking status: Former Smoker     Years: 60.00     Types: Cigarettes     Quit date: 11/19/2010   • Smokeless tobacco: Never Used      Comment: 2 packs per week   • Alcohol use  "Yes      Comment: 1 DAILY   • Drug use: No   • Sexual activity: Not on file       SURGICAL HISTORY   has a past surgical history that includes other (1969); other (1969); hip arthroplasty total (3/31/08); other (1969); and other abdominal surgery.    CURRENT MEDICATIONS  Home Medications     Reviewed by Julia Vidales R.N. (Registered Nurse) on 05/22/19 at 1708  Med List Status: Complete   Medication Last Dose Status   alendronate (FOSAMAX) 10 MG Tab 5/22/2019 Active   aspirin 81 MG tablet 5/22/2019 Active   carvedilol (COREG) 6.25 MG Tab 5/22/2019 Active   Cholecalciferol (D3 HIGH POTENCY) 1000 UNIT Cap 5/21/2019 Active   enalapril (VASOTEC) 10 MG Tab 5/21/2019 Active   Fluticasone Furoate-Vilanterol (BREO ELLIPTA) 100-25 MCG/INH AEROSOL POWDER, BREATH ACTIVATED 5/21/2019 Active   predniSONE (DELTASONE) 10 MG Tab 5/22/2019 Active   tiotropium (SPIRIVA HANDIHALER) 18 MCG Cap 5/21/2019 Active                ALLERGIES  No Known Allergies    PHYSICAL EXAM  VITAL SIGNS: /93   Pulse 70 Comment: post 70  Temp 36.7 °C (98.1 °F) (Oral)   Resp 18 Comment: post 18  Ht 1.727 m (5' 8\")   Wt 58 kg (127 lb 13.9 oz)   SpO2 97%   BMI 19.44 kg/m²    Pulse ox interpretation: On supplemental oxygen, I interpret this pulse ox as normal.  Constitutional: Sitting up in mild distress.  HEENT: Normocephalic, atraumatic. Posterior pharynx clear, mucous membranes dry.  Eyes:  EOMI. Normal sclerae.  Pale conjunctivae.  Neck: Supple, nontender.  Chest/Pulmonary: Very diminished breath sounds bilaterally, rales in the left base.  Cardiovascular: Regular rate and rhythm, no obvious murmur but distant heart sounds per  Abdomen: Soft, nontender; no rebound, guarding, or masses.  Back: No CVA or midline tenderness.   Musculoskeletal: No deformity or tenderness, 1+ symmetric lower extremity edema.  Neuro: Clear speech, normal coordination, cranial nerves II-XII grossly intact, no focal asymmetry or sensory deficits.   Psych: " Normal mood and affect.  Skin: No rashes, warm and dry.      DIAGNOSTIC STUDIES / PROCEDURES    LABS & EKG  Results for orders placed or performed during the hospital encounter of 05/22/19   EC-ECHOCARDIOGRAM COMPLETE W/O CONT   Result Value Ref Range    Eject.Frac. MOD BP 51.12     Eject.Frac. MOD 4C 53.38     Eject.Frac. MOD 2C 41.87     Left Ventrical Ejection Fraction 55    CBC w/ Differential   Result Value Ref Range    WBC 9.1 4.8 - 10.8 K/uL    RBC 3.95 (L) 4.70 - 6.10 M/uL    Hemoglobin 12.7 (L) 14.0 - 18.0 g/dL    Hematocrit 39.5 (L) 42.0 - 52.0 %    .0 (H) 81.4 - 97.8 fL    MCH 32.2 27.0 - 33.0 pg    MCHC 32.2 (L) 33.7 - 35.3 g/dL    RDW 48.6 35.9 - 50.0 fL    Platelet Count 210 164 - 446 K/uL    MPV 8.4 (L) 9.0 - 12.9 fL    Neutrophils-Polys 89.00 (H) 44.00 - 72.00 %    Lymphocytes 8.00 (L) 22.00 - 41.00 %    Monocytes 0.00 0.00 - 13.40 %    Eosinophils 3.00 0.00 - 6.90 %    Basophils 0.00 0.00 - 1.80 %    Nucleated RBC 0.00 /100 WBC    Neutrophils (Absolute) 8.10 (H) 1.82 - 7.42 K/uL    Lymphs (Absolute) 0.73 (L) 1.00 - 4.80 K/uL    Monos (Absolute) 0.00 0.00 - 0.85 K/uL    Eos (Absolute) 0.27 0.00 - 0.51 K/uL    Baso (Absolute) 0.00 0.00 - 0.12 K/uL    NRBC (Absolute) 0.00 K/uL    Anisocytosis 1+     Macrocytosis 2+    Complete Metabolic Panel (CMP)   Result Value Ref Range    Sodium 130 (L) 135 - 145 mmol/L    Potassium 4.7 3.6 - 5.5 mmol/L    Chloride 93 (L) 96 - 112 mmol/L    Co2 28 20 - 33 mmol/L    Anion Gap 9.0 0.0 - 11.9    Glucose 116 (H) 65 - 99 mg/dL    Bun 21 8 - 22 mg/dL    Creatinine 0.94 0.50 - 1.40 mg/dL    Calcium 8.4 8.4 - 10.2 mg/dL    AST(SGOT) 19 12 - 45 U/L    ALT(SGPT) 19 2 - 50 U/L    Alkaline Phosphatase 74 30 - 99 U/L    Total Bilirubin 1.1 0.1 - 1.5 mg/dL    Albumin 3.3 3.2 - 4.9 g/dL    Total Protein 6.4 6.0 - 8.2 g/dL    Globulin 3.1 1.9 - 3.5 g/dL    A-G Ratio 1.1 g/dL   Btype Natriuretic Peptide   Result Value Ref Range    B Natriuretic Peptide 749 (H) 0 - 100 pg/mL    Troponin in two (2) hours   Result Value Ref Range    Troponin I 0.03 0.00 - 0.04 ng/mL   Magnesium   Result Value Ref Range    Magnesium 2.0 1.5 - 2.5 mg/dL   LACTIC ACID   Result Value Ref Range    Lactic Acid 1.6 0.5 - 2.0 mmol/L   PT/INR   Result Value Ref Range    PT 14.0 12.0 - 14.6 sec    INR 1.09 0.87 - 1.13   APTT   Result Value Ref Range    APTT 24.8 24.7 - 36.0 sec   ESTIMATED GFR   Result Value Ref Range    GFR If African American >60 >60 mL/min/1.73 m 2    GFR If Non African American >60 >60 mL/min/1.73 m 2   DIFFERENTIAL MANUAL   Result Value Ref Range    Manual Diff Status PERFORMED    PLATELET ESTIMATE   Result Value Ref Range    Plt Estimation Normal    MORPHOLOGY   Result Value Ref Range    RBC Morphology Present     Polychromia 1+    D-DIMER   Result Value Ref Range    D-Dimer Screen 1.17 (H) 0.00 - 0.50 ug/mL (FEU)   TROPONIN   Result Value Ref Range    Troponin I 0.03 0.00 - 0.04 ng/mL   LACTIC ACID   Result Value Ref Range    Lactic Acid 1.5 0.5 - 2.0 mmol/L   EKG   Result Value Ref Range    Report       [facility] Emergency Dept.    Test Date:  2019  Pt Name:    YANELI DERRICK                   Department: [facility]  MRN:        [id number]                      Room:       [place] [address]  Gender:     Male                         Technician: HRR  :        [id number]                   Requested By:PEDRO ANDERSON  Order #:    [id number]                    Reading MD: Pedro Anderson MD    Measurements  Intervals                                Axis  Rate:       70                           P:          -16  WY:         152                          QRS:        -46  QRSD:       85                           T:          -12  QT:         408  QTc:        441    Interpretive Statements  Sinus rhythm  Probable inferior infarct, age indeterminate  Compared to ECG 10/20/2018 10:36:12  No significant changes  No STEMI or dysrhythmia  Electronically Signed On 2019 12:05:40 PDT by  Pedro Chadwick MD           RADIOLOGY  EC-ECHOCARDIOGRAM COMPLETE W/O CONT   Final Result      CT-CTA CHEST PULMONARY ARTERY W/ RECONS   Final Result      1.  There is no CT evidence of acute pulmonary embolism.   2.  Mildly enlarged heart with new small dependent pleural effusions and dependent groundglass and reticular opacities in both lungs most consistent with changes of pulmonary edema.   3.  There are patchy opacities in the left lower lobe which could represent areas of atelectasis or pneumonia.   4.  There is underlying emphysema.   5.  There is atherosclerosis with coronary artery calcification.            DX-CHEST-PORTABLE (1 VIEW)   Final Result      1.  Worsening interstitial infiltrates bilaterally. Differential diagnosis includes pulmonary edema, atypical infection and progression of interstitial lung disease.      2.  Cardiomegaly.      3.  Somewhat more prominent consolidation within the left lung base possibly representing pneumonia.      NM-CARDIAC STRESS TEST    (Results Pending)         COURSE & MEDICAL DECISION MAKING    This is a 89 y.o. male who presents with dyspnea and low oxygen levels at home.  History of COPD and CHF.  Rales at left lung base.  Hypoxic on supplemental oxygen.    Differential Diagnosis includes but is not limited to:  Acute CHF, COPD, pneumonia, sepsis, respiratory failure, PE    ED Course:  Given the patient's age and multiple comorbidities he merits of broad work-up plan EKG, labs, breathing treatments, steroids, x-ray, and close reevaluation.  Placed on facemask as he is hypoxic on nasal cannula.    EKG consistent with prior, troponin negative highly doubt ACS.  No fevers white count normal no productive cough highly doubt pneumonia.  He is not severely anemic.  He has hyponatremia concordant with clinical suspicion of hypovolemia.  He does have an elevated BNP compared with prior highly suspicious for acute heart failure/volume overload.  Chest x-ray shows  interstitial bilateral infiltrates concerning for edema, that matches the clinical picture.  Plan IV diuretic.    On recheck the patient is resting comfortably but still dyspneic on facemask.  He does not have positive pressure at this time but if he worsens in any way or consider BiPAP.  He is a DNR and would not like intubation if he became comatose and had no hope for recovery.  Patient and family updated on work-up, plan of care including admission, they understand and agree with the plan.  He has no unilateral leg swelling, no chest pain highly doubt PE.    1:12 PM - I consulted with hospitalist Dr. Frank, and she will kindly admit the patient for further work-up and treatment.  The patient and his daughter and son-in-law were updated with concerns for probable acute heart failure and concomitant COPD exacerbation, plan parenteral Lasix and admission to telemetry unit.  The patient is hemodynamically stable for admission to the hospital in guarded condition.    Medications   Respiratory Care per Protocol (not administered)   furosemide (LASIX) injection 20 mg (0 mg Intravenous Held 5/22/19 1345)   senna-docusate (PERICOLACE or SENOKOT S) 8.6-50 MG per tablet 2 Tab (2 Tabs Oral Refused 5/22/19 1800)     And   polyethylene glycol/lytes (MIRALAX) PACKET 1 Packet (not administered)     And   magnesium hydroxide (MILK OF MAGNESIA) suspension 30 mL (not administered)     And   bisacodyl (DULCOLAX) suppository 10 mg (not administered)   enoxaparin (LOVENOX) inj 30 mg (30 mg Subcutaneous Refused 5/22/19 1415)   acetaminophen (TYLENOL) tablet 650 mg (not administered)   ondansetron (ZOFRAN) syringe/vial injection 4 mg (not administered)   ondansetron (ZOFRAN ODT) dispertab 4 mg (not administered)   aspirin EC (ECOTRIN) tablet 81 mg (0 mg Oral Held 5/22/19 1445)   carvedilol (COREG) tablet 6.25 mg (6.25 mg Oral Given 5/22/19 1711)   enalapril (VASOTEC) tablet 10 mg (10 mg Oral Given 5/22/19 1711)   predniSONE  (DELTASONE) tablet 10 mg (0 mg Oral Held 5/22/19 1445)   budesonide-formoterol (SYMBICORT) 160-4.5 MCG/ACT inhaler 2 Puff (not administered)   ipratropium-albuterol (DUONEB) nebulizer solution (3 mL Nebulization Given 5/22/19 1527)   ipratropium-albuterol (DUONEB) nebulizer solution (3 mL Nebulization Given 5/22/19 1200)   methylPREDNISolone sod succ (SOLU-MEDROL) 125 MG injection 125 mg (125 mg Intravenous Given 5/22/19 1155)   furosemide (LASIX) injection 40 mg (40 mg Intravenous Given 5/22/19 1332)   iohexol (OMNIPAQUE) 350 mg/mL (60 mL Intravenous Given 5/22/19 1430)     FINAL IMPRESSION  1. Acute respiratory failure with hypoxia (HCC)    2. Other hypervolemia    3. Acute exacerbation of chronic obstructive pulmonary disease (COPD) (HCC)    4. Weakness    5. Essential hypertension    6. SOB (shortness of breath)    7. Acute pulmonary edema (HCC)        -ADMIT-     Results, exam findings, clinical impression, presumed diagnosis, treatment options, and plan for admission were discussed with the patient and family, and they verbalized understanding, agreed with, and appreciated the plan of care.    Pertinent Labs & Imaging studies reviewed and verified by myself, as well as nursing notes and the patient's past medical, family, and social histories (See chart for details).    Portions of this record were made with voice recognition software.  Despite my review, spelling/grammar/context errors may still remain.  Interpretation of this chart should be taken in this context.    Electronically signed by Pedro Chadwick on 5/22/2019 at 7:18 PM.

## 2019-05-22 NOTE — ED TRIAGE NOTES
"Pt bib family via w/c c/o sob; pt on 023L via nc: 95%; pt breathing regular and speaking full sentences. Pt lips pink. No resp distress noted at this time. Pt lives w son and has visiting health aide; per family pt had \"hypoxia\" pta at home per home health.  "

## 2019-05-22 NOTE — FLOWSHEET NOTE
05/22/19 1531   Events/Summary/Plan   Events/Summary/Plan SVN and DPI given   Interdisciplinary Plan of Care-Goals (Indications)   Bronchodilator Indications History / Diagnosis   Interdisciplinary Plan of Care-Outcomes    Bronchodilator Outcome Improved Vital Signs and Measures of Gas Exchange;Patient at Stable Baseline   SVN Group   #SVN Performed Yes   Given By: Mask   Date SVN Last Changed 05/22/19   Date SVN Next Change Due (Q 7 Days) 05/29/19   MDI/DPI Group   #MDI/DPI Given MDI/DPI x 1   Chest Exam   Work Of Breathing / Effort Mild;Shallow   Respiration 18  (post 18)   Pulse 70  (post 70)   Heart Rate (Monitored) 70   Breath Sounds   Pre/Post Intervention Pre Intervention Assessment  (increased aeration following svn)   RUL Breath Sounds Diminished   RML Breath Sounds Diminished   RLL Breath Sounds Diminished   CARROLL Breath Sounds Diminished   LLL Breath Sounds Diminished   Oximetry   #Pulse Oximetry (Single Determination) Yes   Oxygen   Home O2 Use Prior To Admission? Yes   Home O2 LPM Flow 2 LPM   Home O2 Delivery Method Nasal Cannula   Home O2 Frequency of Use As Needed for SOB   Pulse Oximetry 97 %   O2 (LPM) 3   O2 Daily Delivery Respiratory  OxyMask

## 2019-05-22 NOTE — H&P
Hospital Medicine History & Physical Note    Date of Service  5/22/2019    Primary Care Physician  Ihsan Monet M.D.    Consultants  none    Code Status  DNR/I    Chief Complaint  Chest pressure/ JOEL    History of Presenting Illness  89 y.o. male who presented 5/22/2019 with complaints of pressure across his chest and severe shortness of breath whenever he does anything.  He has a past medical history of congestive heart failure 6 months ago EF was 45 and he had moderate pulmonary hypertension he also has a history of O2 dependent COPD supposed to be on 2 L chronically but seems to use it on a as needed basis and does not wear it at all at night.  He has been having increased swelling in his feet but cannot tell me for how long.  It does not appear that he is on a low-sodium diet.  He does not get any chest pressure or shortness of breath if he is not exerting himself.  Patient was here in March with multiple rib fractures those are still apparent on his imaging studies.  He has a cough but is nonproductive, his appetite has been fairly normal, after he was discharged from here he went to [facility] for 1 month he has been home for 3 weeks-he says he has been doing poorly the whole time but per his family he was doing very well when he first returned from [facility] and has been dwindling ever since.  The patient denies any dizziness or headache he denies palpitations he denies increased cough fever or chills.    Review of Systems  Review of Systems   Constitutional: Negative for chills and fever.   HENT: Negative for sinus pain and sore throat.    Eyes: Negative for discharge and redness.        Poor vision due to macular degeneration   Respiratory: Positive for cough (Occasional) and shortness of breath. Negative for sputum production (Initially yes but now just gets a tickle in his throat).    Cardiovascular: Positive for chest pain (pressure). Orthopnea: Unclear as he always sleeps with his head of bed  slightly elevated.   Gastrointestinal: Negative for abdominal pain, constipation, diarrhea, nausea and vomiting.   Genitourinary: Negative for dysuria.   Musculoskeletal: Negative for falls.   Skin: Negative for itching and rash.   Neurological: Negative for dizziness, sensory change, speech change and headaches.        Feels funny in the head sometimes   Psychiatric/Behavioral: The patient is not nervous/anxious.        Past Medical History   has a past medical history of ASTHMA; Congestive heart failure (HCC); Emphysema lung (HCC) (); and Pain.  Macular degeneration    Surgical History   has a past surgical history that includes other (); other (); hip arthroplasty total (3/31/08); other (); and other abdominal surgery.  Had abscess removed from his intestine along with partial bowel resection.  He had a cyst removed from his brain in     Family History  family history includes Cancer in his other.  He does not know what his mother  from his father  from cancer at the age of 47 but he does not know what type.  To his knowledge there is no family history of diabetes or heart disease    Social History   reports that he quit smoking about 8 years ago. His smoking use included Cigarettes. He quit after 60.00 years of use. He has never used smokeless tobacco. He reports that he drinks alcohol. He reports that he does not use drugs.  He has 4 children he has 2 daughters that live in [place], he lives with 1 son and he has another son here in [place].    Allergies  No Known Allergies    Medications  Prior to Admission Medications   Prescriptions Last Dose Informant Patient Reported? Taking?   Cholecalciferol (D3 HIGH POTENCY) 1000 UNIT Cap unk at [facility] Patient's Home Pharmacy Yes No   Sig: Take 1,000 Units by mouth every day at 6 PM.   Fluticasone Furoate-Vilanterol (BREO ELLIPTA) 100-25 MCG/INH AEROSOL POWDER, BREATH ACTIVATED unk at [facility] Patient's Home Pharmacy Yes No   Sig: Inhale 1 Puff by  mouth every day.   alendronate (FOSAMAX) 10 MG Tab unk at [facility] Patient's Home Pharmacy Yes Yes   Sig: Take 10 mg by mouth every morning.   aspirin 81 MG tablet unk at [facility] Patient's Home Pharmacy Yes No   Sig: Take 81 mg by mouth every day.   carvedilol (COREG) 6.25 MG Tab unk at am Patient's Home Pharmacy Yes Yes   Sig: Take 6.25 mg by mouth 2 Times a Day.   enalapril (VASOTEC) 10 MG Tab unk at [facility] Patient's Home Pharmacy Yes Yes   Sig: Take 10 mg by mouth 2 Times a Day.   predniSONE (DELTASONE) 10 MG Tab unk at [facility] Patient's Home Pharmacy Yes No   Sig: Take 10 mg by mouth every day.   tiotropium (SPIRIVA HANDIHALER) 18 MCG Cap unk at [facility] Patient's Home Pharmacy Yes No   Sig: Inhale 18 mcg by mouth every day.      Facility-Administered Medications: None       Physical Exam  Temp:  [36.1 °C (97 °F)] 36.1 °C (97 °F)  Pulse:  [68-75] 72  Resp:  [20] 20  BP: (145)/(79) 145/79  SpO2:  [95 %-100 %] 96 %    Physical Exam   Constitutional: He is oriented to person, place, and time. He appears well-developed. No distress.   Elderly, frail, nontoxic   HENT:   Head: Normocephalic.   Mouth/Throat: Oropharynx is clear and moist.   He has an eschar on the lateral aspect of his nasal bridge as well as soft tissue swelling and eschars on the lower right lip   Eyes: Pupils are equal, round, and reactive to light. Conjunctivae and EOM are normal. Right eye exhibits no discharge. Left eye exhibits no discharge. No scleral icterus.   Neck: Neck supple.   Cardiovascular: Normal rate and regular rhythm.    Pulmonary/Chest: Effort normal. No respiratory distress. He has no wheezes. He has rales.   Markedly diminished with muffled crackles in the right base   Abdominal: Soft. Bowel sounds are normal. He exhibits no distension. There is tenderness.   Musculoskeletal: He exhibits edema. He exhibits no tenderness.   Neurological: He is alert and oriented to person, place, and time. No cranial nerve deficit.   Skin: Skin is warm and dry. He is  not diaphoretic.   Psychiatric: He has a normal mood and affect.   Nursing note and vitals reviewed.      Laboratory:  Recent Labs      05/22/19   1140   WBC  9.1   RBC  3.95*   HEMOGLOBIN  12.7*   HEMATOCRIT  39.5*   MCV  100.0*   MCH  32.2   MCHC  32.2*   RDW  48.6   PLATELETCT  210   MPV  8.4*     Recent Labs      05/22/19   1140   SODIUM  130*   POTASSIUM  4.7   CHLORIDE  93*   CO2  28   GLUCOSE  116*   BUN  21   CREATININE  0.94   CALCIUM  8.4     Recent Labs      05/22/19   1140   ALTSGPT  19   ASTSGOT  19   ALKPHOSPHAT  74   TBILIRUBIN  1.1   GLUCOSE  116*     Recent Labs      05/22/19   1140   APTT  24.8   INR  1.09     Recent Labs      05/22/19   1140   BNPBTYPENAT  749*         Recent Labs      05/22/19   1333   TROPONINI  0.03       Urinalysis:    No results found     Imaging:  DX-CHEST-PORTABLE (1 VIEW)   Final Result      1.  Worsening interstitial infiltrates bilaterally. Differential diagnosis includes pulmonary edema, atypical infection and progression of interstitial lung disease.      2.  Cardiomegaly.      3.  Somewhat more prominent consolidation within the left lung base possibly representing pneumonia.      EC-ECHOCARDIOGRAM COMPLETE W/O CONT    (Results Pending)   CT-CTA CHEST PULMONARY ARTERY W/ RECONS    (Results Pending)   NM-CARDIAC STRESS TEST    (Results Pending)         Assessment/Plan:  I anticipate this patient will require at least two midnights for appropriate medical management, necessitating inpatient admission.    Chronic obstructive pulmonary disease with acute exacerbation (HCC)- (present on admission)   Assessment & Plan    He has markedly diminished breath sounds unclear what his baseline is RT protocol has been ordered  There is no overt wheezing so I will not use his steroids at this time     Multiple rib fractures   Assessment & Plan    Present since March 2019 following fall  Patient has been declining since discharge from [facility] 3 weeks ago  PT eval     Acute on  chronic respiratory failure with hypoxemia (HCC)   Assessment & Plan    Patient has significantly higher oxygen demand than baseline of 2 L  D-dimer is elevated  CTA showed no clot however but multiple rib fractures which were present in March 2019.     D-dimer, elevated   Assessment & Plan    No PE     Acute exacerbation of CHF (congestive heart failure) ([facility])- (present on admission)   Assessment & Plan    Patient has elevated BNP bilateral pedal edema and faint crackles in the right base  It appears he does not follow low-sodium diet or any kind of fluid restriction at home  We will reevaluate echo, diuresis, fluid restrict, low-sodium diet  His son/caregiver will likely need dietary instruction and guidance prior to discharge.  Continue Coreg, Vasotec  He describes as exertional chest pressure and shortness of breath he has no recent stress test we will obtain one to better optimize his medications if appropriate         VTE prophylaxis: Lovenox

## 2019-05-22 NOTE — ASSESSMENT & PLAN NOTE
Elevated BNP and crackles on admission, he was not previously taking Lasix despite known EF of 45%.  I reviewed the repeat echo and it shows EF of 55%, suspect element of pulmonary hypertension  Continue Lasix 20 p.o. daily  Low-salt diet  Continue Coreg, Vasotec

## 2019-05-22 NOTE — ED TRIAGE NOTES
Chief Complaint   Patient presents with   • Shortness of Breath     Agree with triage RN. PIV established, blood drawn and sent to lab. EKG obtained. Pt placed on monitor. Family at bedside.

## 2019-05-22 NOTE — DISCHARGE PLANNING
*ATTN Discharge Planning team: This patient is currently on service with [facility]. Please submit a referral order and face-to-face prior to discharging the patient home. If you have any questions, contact our Transitional Care Specialist team at x [phone].

## 2019-05-22 NOTE — FLOWSHEET NOTE
05/22/19 1205   Events/Summary/Plan   Events/Summary/Plan SVN given in ER   Interdisciplinary Plan of Care-Goals (Indications)   Bronchodilator Indications History / Diagnosis   Interdisciplinary Plan of Care-Outcomes    Bronchodilator Outcome Improved Vital Signs and Measures of Gas Exchange;Patient at Stable Baseline   Education   Education Yes - Pt. / Family has been Instructed in use of Respiratory Equipment;Yes - Pt. / Family has been Instructed in use of Respiratory Medications and Adverse Reactions   SVN Group   #SVN Performed Yes   Given By: Mask   Date SVN Last Changed 05/22/19   Date SVN Next Change Due (Q 7 Days) 05/29/19   Respiratory WDL   Respiratory (WDL) X   Chest Exam   Work Of Breathing / Effort Mild;Shallow   Respiration 20  (post 20)   Pulse 68  (post 70)   Heart Rate (Monitored) 68   Breath Sounds   Pre/Post Intervention Pre Intervention Assessment  (increased aeration following svn)   RUL Breath Sounds Diminished   RML Breath Sounds Diminished   RLL Breath Sounds Diminished;Crackles   CARROLL Breath Sounds Diminished   LLL Breath Sounds Diminished;Crackles   Oximetry   #Pulse Oximetry (Single Determination) Yes   Oxygen   Home O2 Use Prior To Admission? Yes   Home O2 LPM Flow 2 LPM   Home O2 Delivery Method Nasal Cannula   Home O2 Frequency of Use As Needed for SOB   Pulse Oximetry 100 %   O2 (LPM) 6  (titrate 02 to 4 liters. )   O2 Daily Delivery Respiratory  OxyMask        05/22/19 1205   Events/Summary/Plan   Events/Summary/Plan SVN given in ER   Interdisciplinary Plan of Care-Goals (Indications)   Bronchodilator Indications History / Diagnosis   Interdisciplinary Plan of Care-Outcomes    Bronchodilator Outcome Improved Vital Signs and Measures of Gas Exchange;Patient at Stable Baseline   Education   Education Yes - Pt. / Family has been Instructed in use of Respiratory Equipment;Yes - Pt. / Family has been Instructed in use of Respiratory Medications and Adverse Reactions   SVN Group   #SVN  Performed Yes   Given By: Mask   Date SVN Last Changed 05/22/19   Date SVN Next Change Due (Q 7 Days) 05/29/19   Respiratory WDL   Respiratory (WDL) X   Chest Exam   Work Of Breathing / Effort Mild;Shallow   Respiration 20  (post 20)   Pulse 68  (post 70)   Heart Rate (Monitored) 68   Breath Sounds   Pre/Post Intervention Pre Intervention Assessment  (increased aeration following svn)   RUL Breath Sounds Diminished   RML Breath Sounds Diminished   RLL Breath Sounds Diminished;Crackles   CARROLL Breath Sounds Diminished   LLL Breath Sounds Diminished;Crackles   Oximetry   #Pulse Oximetry (Single Determination) Yes   Oxygen   Home O2 Use Prior To Admission? Yes   Home O2 LPM Flow 2 LPM   Home O2 Delivery Method Nasal Cannula   Home O2 Frequency of Use As Needed for SOB   Pulse Oximetry 100 %   O2 (LPM) 6  (titrate 02 to 4 liters. )   O2 Daily Delivery Respiratory  OxyMask

## 2019-05-23 ENCOUNTER — APPOINTMENT (OUTPATIENT)
Dept: RADIOLOGY | Facility: MEDICAL CENTER | Age: 84
DRG: 291 | End: 2019-05-23
Attending: INTERNAL MEDICINE
Payer: MEDICARE

## 2019-05-23 ENCOUNTER — HOME CARE VISIT (OUTPATIENT)
Dept: HOME HEALTH SERVICES | Facility: HOME HEALTHCARE | Age: 84
End: 2019-05-23
Payer: MEDICARE

## 2019-05-23 ENCOUNTER — PATIENT OUTREACH (OUTPATIENT)
Dept: HEALTH INFORMATION MANAGEMENT | Facility: OTHER | Age: 84
End: 2019-05-23

## 2019-05-23 LAB
ALBUMIN SERPL BCP-MCNC: 3 G/DL (ref 3.2–4.9)
ANION GAP SERPL CALC-SCNC: 9 MMOL/L (ref 0–11.9)
BUN SERPL-MCNC: 23 MG/DL (ref 8–22)
CALCIUM SERPL-MCNC: 8 MG/DL (ref 8.4–10.2)
CHLORIDE SERPL-SCNC: 93 MMOL/L (ref 96–112)
CO2 SERPL-SCNC: 29 MMOL/L (ref 20–33)
CREAT SERPL-MCNC: 0.8 MG/DL (ref 0.5–1.4)
GLUCOSE SERPL-MCNC: 169 MG/DL (ref 65–99)
LACTATE BLD-SCNC: 1.2 MMOL/L (ref 0.5–2)
PHOSPHATE SERPL-MCNC: 4.5 MG/DL (ref 2.5–4.5)
POTASSIUM SERPL-SCNC: 3.8 MMOL/L (ref 3.6–5.5)
SODIUM SERPL-SCNC: 131 MMOL/L (ref 135–145)
TROPONIN I SERPL-MCNC: 0.03 NG/ML (ref 0–0.04)

## 2019-05-23 PROCEDURE — 93018 CV STRESS TEST I&R ONLY: CPT | Performed by: INTERNAL MEDICINE

## 2019-05-23 PROCEDURE — 700102 HCHG RX REV CODE 250 W/ 637 OVERRIDE(OP): Performed by: INTERNAL MEDICINE

## 2019-05-23 PROCEDURE — 80069 RENAL FUNCTION PANEL: CPT

## 2019-05-23 PROCEDURE — 700111 HCHG RX REV CODE 636 W/ 250 OVERRIDE (IP)

## 2019-05-23 PROCEDURE — A9502 TC99M TETROFOSMIN: HCPCS

## 2019-05-23 PROCEDURE — 770020 HCHG ROOM/CARE - TELE (206)

## 2019-05-23 PROCEDURE — 94760 N-INVAS EAR/PLS OXIMETRY 1: CPT

## 2019-05-23 PROCEDURE — 99232 SBSQ HOSP IP/OBS MODERATE 35: CPT | Performed by: INTERNAL MEDICINE

## 2019-05-23 PROCEDURE — 83605 ASSAY OF LACTIC ACID: CPT

## 2019-05-23 PROCEDURE — 94640 AIRWAY INHALATION TREATMENT: CPT

## 2019-05-23 PROCEDURE — 700101 HCHG RX REV CODE 250: Performed by: INTERNAL MEDICINE

## 2019-05-23 PROCEDURE — 84484 ASSAY OF TROPONIN QUANT: CPT

## 2019-05-23 PROCEDURE — 78452 HT MUSCLE IMAGE SPECT MULT: CPT | Mod: 26 | Performed by: INTERNAL MEDICINE

## 2019-05-23 PROCEDURE — A9270 NON-COVERED ITEM OR SERVICE: HCPCS | Performed by: INTERNAL MEDICINE

## 2019-05-23 PROCEDURE — 97161 PT EVAL LOW COMPLEX 20 MIN: CPT

## 2019-05-23 PROCEDURE — 700111 HCHG RX REV CODE 636 W/ 250 OVERRIDE (IP): Performed by: INTERNAL MEDICINE

## 2019-05-23 RX ORDER — ATORVASTATIN CALCIUM 40 MG/1
40 TABLET, FILM COATED ORAL EVERY EVENING
Status: DISCONTINUED | OUTPATIENT
Start: 2019-05-23 | End: 2019-05-28 | Stop reason: HOSPADM

## 2019-05-23 RX ORDER — REGADENOSON 0.08 MG/ML
INJECTION, SOLUTION INTRAVENOUS
Status: COMPLETED
Start: 2019-05-23 | End: 2019-05-23

## 2019-05-23 RX ADMIN — BUDESONIDE AND FORMOTEROL FUMARATE DIHYDRATE 2 PUFF: 160; 4.5 AEROSOL RESPIRATORY (INHALATION) at 07:05

## 2019-05-23 RX ADMIN — PREDNISONE 10 MG: 20 TABLET ORAL at 06:40

## 2019-05-23 RX ADMIN — CARVEDILOL 6.25 MG: 6.25 TABLET, FILM COATED ORAL at 18:00

## 2019-05-23 RX ADMIN — IPRATROPIUM BROMIDE AND ALBUTEROL SULFATE 3 ML: .5; 3 SOLUTION RESPIRATORY (INHALATION) at 19:21

## 2019-05-23 RX ADMIN — REGADENOSON 0.4 MG: 0.08 INJECTION, SOLUTION INTRAVENOUS at 09:05

## 2019-05-23 RX ADMIN — ENALAPRIL MALEATE 10 MG: 5 TABLET ORAL at 18:00

## 2019-05-23 RX ADMIN — ATORVASTATIN CALCIUM 40 MG: 40 TABLET, FILM COATED ORAL at 18:00

## 2019-05-23 RX ADMIN — ENOXAPARIN SODIUM 30 MG: 100 INJECTION SUBCUTANEOUS at 06:42

## 2019-05-23 RX ADMIN — IPRATROPIUM BROMIDE AND ALBUTEROL SULFATE 3 ML: .5; 3 SOLUTION RESPIRATORY (INHALATION) at 13:26

## 2019-05-23 RX ADMIN — FUROSEMIDE 20 MG: 10 INJECTION, SOLUTION INTRAVENOUS at 17:14

## 2019-05-23 RX ADMIN — FUROSEMIDE 20 MG: 10 INJECTION, SOLUTION INTRAVENOUS at 06:41

## 2019-05-23 RX ADMIN — ASPIRIN 81 MG: 81 TABLET, COATED ORAL at 06:40

## 2019-05-23 RX ADMIN — BUDESONIDE AND FORMOTEROL FUMARATE DIHYDRATE 2 PUFF: 160; 4.5 AEROSOL RESPIRATORY (INHALATION) at 19:21

## 2019-05-23 RX ADMIN — ENALAPRIL MALEATE 10 MG: 5 TABLET ORAL at 06:40

## 2019-05-23 RX ADMIN — CARVEDILOL 6.25 MG: 6.25 TABLET, FILM COATED ORAL at 10:28

## 2019-05-23 ASSESSMENT — GAIT ASSESSMENTS
ASSISTIVE DEVICE: FRONT WHEEL WALKER
DISTANCE (FEET): 75
GAIT LEVEL OF ASSIST: SUPERVISED
DEVIATION: DECREASED BASE OF SUPPORT

## 2019-05-23 NOTE — PROGRESS NOTES
Telemetry Shift Summary    Rhythm SR/SB  HR Range 50s-80s  Ectopy fPVC, fPAC, 1 Trip  Measurements 0.14/0.08/0.38        Normal Values  Rhythm SR  HR Range    Measurements 0.12-0.20 / 0.06-0.10  / 0.30-0.52

## 2019-05-23 NOTE — FLOWSHEET NOTE
05/23/19 1038   Therapy Not Performed   Type of Therapy Not Performed SVN   Reason Therapy Not Performed Refused

## 2019-05-23 NOTE — PROGRESS NOTES
Completed assessment. Bed in low position, locked, and appropriate alarms set. Personal belongings and call light are within reach. Patient has no additional needs at this time.

## 2019-05-23 NOTE — PROGRESS NOTES
2 RN skin check complete with Marin.  Devices in place none.  Skin assessed under devices none.  Confirmed pressure ulcers found on none.      Pt's arms/legs are bruised and blistered.

## 2019-05-23 NOTE — CARE PLAN
Problem: Safety  Goal: Will remain free from injury  Outcome: PROGRESSING AS EXPECTED  Remind patient to use call light and provide assistance. Bed in low position, bed locked, and appropriate alarms set. Patient wearing non-slip socks. Call light and personal belongings are within reach.    Problem: Infection  Goal: Will remain free from infection  Outcome: PROGRESSING AS EXPECTED  Assess and monitor for signs and symptoms of infection. Perform hand hygiene before and after patient contact and entering/exiting the room. Educate patient on infection control and hand hygiene.     Problem: Knowledge Deficit  Goal: Knowledge of the prescribed therapeutic regimen will improve  Outcome: PROGRESSING AS EXPECTED  Encourage patient to ask questions and be involved in plan of care. Provide education on treatment plan, diagnostic testing, and medications; have patient verbalize understanding.

## 2019-05-23 NOTE — PROGRESS NOTES
Report received from NANO Funes. Patient resting comfortably in bed. Declines any needs at this time. Call light in reach. Bed alarm on.

## 2019-05-23 NOTE — THERAPY
"Physical Therapy Evaluation completed.   Bed Mobility:  Supine to Sit: Modified Independent  Transfers: Sit to Stand: Supervised  Gait: Level Of Assist: Supervised with Front-Wheel Walker  X 75 feet      Plan of Care: Will benefit from Physical Therapy 3 times per week  Discharge Recommendations: Equipment: No Equipment Needed. Home vrs SNF vrs Hospice    See \"Rehab Therapy-Acute\" Patient Summary Report for complete documentation.     "

## 2019-05-23 NOTE — FLOWSHEET NOTE
05/22/19 1925   Events/Summary/Plan   Events/Summary/Plan pt refusing breathing treatments. No respiratory distress noted. Pt informed that if he feels SOB, to speak with RN for prn svn txs. RN aware.     Interdisciplinary Plan of Care-Goals (Indications)   Bronchodilator Indications History / Diagnosis   Interdisciplinary Plan of Care-Outcomes    Bronchodilator Outcome Patient at Stable Baseline   Education   Education Yes - Pt. / Family has been Instructed in use of Respiratory Medications and Adverse Reactions   Therapy Not Performed   Type of Therapy Not Performed SVN   Reason Therapy Not Performed Refused   MDI/DPI Group   #MDI/DPI Given MDI/DPI x 1   Respiratory WDL   Respiratory (WDL) WDL   Chest Exam   Work Of Breathing / Effort Mild   Respiration 18   Pulse 72   Breath Sounds   RUL Breath Sounds Clear   RML Breath Sounds Clear   RLL Breath Sounds Diminished   CARROLL Breath Sounds Clear   LLL Breath Sounds Diminished   Oximetry   #Pulse Oximetry (Single Determination) Yes   Oxygen   Pulse Oximetry 94 %   O2 (LPM) 3   O2 Daily Delivery Respiratory  Silicone Nasal Cannula

## 2019-05-23 NOTE — PROGRESS NOTES
Received bedside report from NANO Dumont. Plan of care discussed. Safety precautions in place. Call light and personal belongings within reach. Patient has no needs at this time. Son at bedside.

## 2019-05-23 NOTE — PROGRESS NOTES
Patient presents to [facility] suite for pharmacological cardiac stress test with MPI. Nursing goals identified: knowledge deficit, potential for anxiety r/t stress test, potential for compromised cardiac output. Care plan includes educating patient, reassurance and access to ACLS cart/team. Allergies, history, labs and ECG reviewed. No caffeine and NPO confirmed. Resting images attained. Patient prepped for pharmacological cardiac stress study. Lexiscan infused over 12 seconds. Patient reported these symptoms: SOB, flushed. Caffeinated beverage provided. Symptoms resolved. Patient denied pain or discomfort during procedure, except at IV site. IV patent. Some heme noted under Tegaderm. Tegaderm changed.

## 2019-05-23 NOTE — CARE PLAN
Problem: Safety  Goal: Will remain free from falls  Outcome: PROGRESSING AS EXPECTED  Reinforced fall risk precautions. Bed alarm on, Non-skid socks on feet, call light in reach. 1 person assist to the bathroom with walker.     Problem: Knowledge Deficit  Goal: Knowledge of disease process/condition, treatment plan, diagnostic tests, and medications will improve  Outcome: PROGRESSING AS EXPECTED  Discuss POC with Pt. Encourage patient to ask questions and be involved in plan of care. Assess Pt's knowledge of disease process, treatment plan, diagnostic test, labs, and medications; educate, and give information as needed.     Problem: Respiratory:  Goal: Respiratory status will improve  Outcome: PROGRESSING AS EXPECTED  Assess respiratory status. Encouraged deep breathing and coughing. Encouraged and assisted with use of Incentive Spirometer. Administer medications as scheduled. And PRN breathing tx as requested. Monitor O2 Sat and administer O2 as needed to keep sats >90% - on 3L (2L at home)

## 2019-05-23 NOTE — FLOWSHEET NOTE
05/23/19 0700   Interdisciplinary Plan of Care-Goals (Indications)   Bronchodilator Indications History / Diagnosis   Interdisciplinary Plan of Care-Outcomes    Bronchodilator Outcome Patient at Stable Baseline   Education   Education Yes - Pt. / Family has been Instructed in use of Respiratory Equipment;Yes - Pt. / Family has been Instructed in use of Respiratory Medications and Adverse Reactions   Therapy Not Performed   Type of Therapy Not Performed SVN   Reason Therapy Not Performed Refused   RT Assessment of Delivered Medications   Evaluation of Medication Delivery Daily Yes-- Pt /Family has been Instructed in use of Respiratory Medications and Adverse Reactions   MDI/DPI Group   #MDI/DPI Given MDI/DPI x 1   Respiratory WDL   Respiratory (WDL) X   Chest Exam   Work Of Breathing / Effort Mild   Respiration 18   Heart Rate (Monitored) 74   Breath Sounds   Pre/Post Intervention Pre Intervention Assessment   RUL Breath Sounds Clear   RML Breath Sounds Clear   RLL Breath Sounds Fine Crackles   CARROLL Breath Sounds Clear   LLL Breath Sounds Clear;Diminished   Oximetry   #Pulse Oximetry (Single Determination) Yes   Oxygen   Pulse Oximetry 95 %   O2 (LPM) 3   O2 Daily Delivery Respiratory  Silicone Nasal Cannula

## 2019-05-23 NOTE — PROGRESS NOTES
Telemetry Shift Summary    Rhythm SR  HR Range 80s  Ectopy Freq PVC  Measurements 0.16/0.08/0.40        Normal Values  Rhythm SR  HR Range    Measurements 0.12-0.20 / 0.06-0.10  / 0.30-0.52

## 2019-05-23 NOTE — RESPIRATORY CARE
COPD EDUCATION by COPD CLINICAL EDUCATOR  5/23/2019 at 11:25 A PM by Radha Tao     Patient interviewed by COPD education team. Patient refused COPD program at this time. Patient states he does not have COPD and does not take Breo or Spiriva, has SOB all the time associated with heart issues.

## 2019-05-23 NOTE — PROGRESS NOTES
Gave bedside report to NANO Burt. Plan of care discussed. Safety precautions in place. Personal belongings and call light are with in reach. Patient has no additional needs at this time.

## 2019-05-24 PROBLEM — J96.21 ACUTE ON CHRONIC RESPIRATORY FAILURE WITH HYPOXIA (HCC): Status: ACTIVE | Noted: 2019-03-07

## 2019-05-24 PROBLEM — E43 SEVERE PROTEIN-CALORIE MALNUTRITION (HCC): Status: ACTIVE | Noted: 2019-05-24

## 2019-05-24 PROBLEM — I25.10 CORONARY ARTERY DISEASE: Status: ACTIVE | Noted: 2019-05-24

## 2019-05-24 LAB
ALBUMIN SERPL BCP-MCNC: 2.8 G/DL (ref 3.2–4.9)
ANION GAP SERPL CALC-SCNC: 11 MMOL/L (ref 0–11.9)
BASOPHILS # BLD AUTO: 0.2 % (ref 0–1.8)
BASOPHILS # BLD: 0.02 K/UL (ref 0–0.12)
BUN SERPL-MCNC: 23 MG/DL (ref 8–22)
CALCIUM SERPL-MCNC: 8 MG/DL (ref 8.4–10.2)
CHLORIDE SERPL-SCNC: 93 MMOL/L (ref 96–112)
CO2 SERPL-SCNC: 30 MMOL/L (ref 20–33)
CREAT SERPL-MCNC: 1 MG/DL (ref 0.5–1.4)
EOSINOPHIL # BLD AUTO: 0.11 K/UL (ref 0–0.51)
EOSINOPHIL NFR BLD: 1.1 % (ref 0–6.9)
ERYTHROCYTE [DISTWIDTH] IN BLOOD BY AUTOMATED COUNT: 47.3 FL (ref 35.9–50)
GLUCOSE SERPL-MCNC: 114 MG/DL (ref 65–99)
HCT VFR BLD AUTO: 36.2 % (ref 42–52)
HGB BLD-MCNC: 11.8 G/DL (ref 14–18)
IMM GRANULOCYTES # BLD AUTO: 0.2 K/UL (ref 0–0.11)
IMM GRANULOCYTES NFR BLD AUTO: 2 % (ref 0–0.9)
LYMPHOCYTES # BLD AUTO: 0.67 K/UL (ref 1–4.8)
LYMPHOCYTES NFR BLD: 6.6 % (ref 22–41)
MCH RBC QN AUTO: 31.7 PG (ref 27–33)
MCHC RBC AUTO-ENTMCNC: 32.6 G/DL (ref 33.7–35.3)
MCV RBC AUTO: 97.3 FL (ref 81.4–97.8)
MONOCYTES # BLD AUTO: 0.82 K/UL (ref 0–0.85)
MONOCYTES NFR BLD AUTO: 8.1 % (ref 0–13.4)
NEUTROPHILS # BLD AUTO: 8.27 K/UL (ref 1.82–7.42)
NEUTROPHILS NFR BLD: 82 % (ref 44–72)
NRBC # BLD AUTO: 0 K/UL
NRBC BLD-RTO: 0 /100 WBC
PHOSPHATE SERPL-MCNC: 3.6 MG/DL (ref 2.5–4.5)
PLATELET # BLD AUTO: 214 K/UL (ref 164–446)
PMV BLD AUTO: 8.6 FL (ref 9–12.9)
POTASSIUM SERPL-SCNC: 3.4 MMOL/L (ref 3.6–5.5)
RBC # BLD AUTO: 3.72 M/UL (ref 4.7–6.1)
SODIUM SERPL-SCNC: 134 MMOL/L (ref 135–145)
WBC # BLD AUTO: 10.1 K/UL (ref 4.8–10.8)

## 2019-05-24 PROCEDURE — 700101 HCHG RX REV CODE 250: Performed by: INTERNAL MEDICINE

## 2019-05-24 PROCEDURE — 94760 N-INVAS EAR/PLS OXIMETRY 1: CPT

## 2019-05-24 PROCEDURE — 770020 HCHG ROOM/CARE - TELE (206)

## 2019-05-24 PROCEDURE — 700111 HCHG RX REV CODE 636 W/ 250 OVERRIDE (IP): Performed by: INTERNAL MEDICINE

## 2019-05-24 PROCEDURE — A9270 NON-COVERED ITEM OR SERVICE: HCPCS | Performed by: INTERNAL MEDICINE

## 2019-05-24 PROCEDURE — 94640 AIRWAY INHALATION TREATMENT: CPT

## 2019-05-24 PROCEDURE — 97116 GAIT TRAINING THERAPY: CPT

## 2019-05-24 PROCEDURE — 700102 HCHG RX REV CODE 250 W/ 637 OVERRIDE(OP): Performed by: INTERNAL MEDICINE

## 2019-05-24 PROCEDURE — 80069 RENAL FUNCTION PANEL: CPT

## 2019-05-24 PROCEDURE — 99232 SBSQ HOSP IP/OBS MODERATE 35: CPT | Performed by: INTERNAL MEDICINE

## 2019-05-24 PROCEDURE — 85025 COMPLETE CBC W/AUTO DIFF WBC: CPT

## 2019-05-24 PROCEDURE — 97165 OT EVAL LOW COMPLEX 30 MIN: CPT

## 2019-05-24 PROCEDURE — 97110 THERAPEUTIC EXERCISES: CPT

## 2019-05-24 RX ORDER — FUROSEMIDE 20 MG/1
20 TABLET ORAL
Status: DISCONTINUED | OUTPATIENT
Start: 2019-05-25 | End: 2019-05-28 | Stop reason: HOSPADM

## 2019-05-24 RX ORDER — POTASSIUM CHLORIDE 20 MEQ/1
40 TABLET, EXTENDED RELEASE ORAL ONCE
Status: COMPLETED | OUTPATIENT
Start: 2019-05-24 | End: 2019-05-24

## 2019-05-24 RX ORDER — IPRATROPIUM BROMIDE AND ALBUTEROL SULFATE 2.5; .5 MG/3ML; MG/3ML
3 SOLUTION RESPIRATORY (INHALATION)
Status: DISCONTINUED | OUTPATIENT
Start: 2019-05-24 | End: 2019-05-25

## 2019-05-24 RX ADMIN — ASPIRIN 81 MG: 81 TABLET, COATED ORAL at 05:28

## 2019-05-24 RX ADMIN — BUDESONIDE AND FORMOTEROL FUMARATE DIHYDRATE 2 PUFF: 160; 4.5 AEROSOL RESPIRATORY (INHALATION) at 19:41

## 2019-05-24 RX ADMIN — ENALAPRIL MALEATE 10 MG: 5 TABLET ORAL at 05:28

## 2019-05-24 RX ADMIN — PREDNISONE 10 MG: 20 TABLET ORAL at 05:28

## 2019-05-24 RX ADMIN — ENOXAPARIN SODIUM 30 MG: 100 INJECTION SUBCUTANEOUS at 05:31

## 2019-05-24 RX ADMIN — CARVEDILOL 6.25 MG: 6.25 TABLET, FILM COATED ORAL at 08:04

## 2019-05-24 RX ADMIN — IPRATROPIUM BROMIDE AND ALBUTEROL SULFATE 3 ML: .5; 3 SOLUTION RESPIRATORY (INHALATION) at 14:40

## 2019-05-24 RX ADMIN — IPRATROPIUM BROMIDE AND ALBUTEROL SULFATE 3 ML: .5; 3 SOLUTION RESPIRATORY (INHALATION) at 07:06

## 2019-05-24 RX ADMIN — FUROSEMIDE 20 MG: 10 INJECTION, SOLUTION INTRAVENOUS at 05:42

## 2019-05-24 RX ADMIN — CARVEDILOL 6.25 MG: 6.25 TABLET, FILM COATED ORAL at 17:28

## 2019-05-24 RX ADMIN — POTASSIUM CHLORIDE 40 MEQ: 1500 TABLET, EXTENDED RELEASE ORAL at 09:21

## 2019-05-24 RX ADMIN — IPRATROPIUM BROMIDE AND ALBUTEROL SULFATE 3 ML: .5; 3 SOLUTION RESPIRATORY (INHALATION) at 11:43

## 2019-05-24 RX ADMIN — ENALAPRIL MALEATE 10 MG: 5 TABLET ORAL at 17:28

## 2019-05-24 RX ADMIN — IPRATROPIUM BROMIDE AND ALBUTEROL SULFATE 3 ML: .5; 3 SOLUTION RESPIRATORY (INHALATION) at 19:41

## 2019-05-24 RX ADMIN — BUDESONIDE AND FORMOTEROL FUMARATE DIHYDRATE 2 PUFF: 160; 4.5 AEROSOL RESPIRATORY (INHALATION) at 07:06

## 2019-05-24 RX ADMIN — ATORVASTATIN CALCIUM 40 MG: 40 TABLET, FILM COATED ORAL at 17:28

## 2019-05-24 ASSESSMENT — ENCOUNTER SYMPTOMS
HALLUCINATIONS: 0
SORE THROAT: 0
BLOOD IN STOOL: 0
MEMORY LOSS: 1
WEAKNESS: 1
DIARRHEA: 0
COUGH: 0
FEVER: 0
CHILLS: 0
NAUSEA: 0
DIZZINESS: 0
DEPRESSION: 0
HEARTBURN: 0
VOMITING: 0
SHORTNESS OF BREATH: 1
PALPITATIONS: 0
BACK PAIN: 0
FOCAL WEAKNESS: 0
MYALGIAS: 0
ABDOMINAL PAIN: 0
HEADACHES: 0

## 2019-05-24 ASSESSMENT — COGNITIVE AND FUNCTIONAL STATUS - GENERAL
MOVING FROM LYING ON BACK TO SITTING ON SIDE OF FLAT BED: A LITTLE
WALKING IN HOSPITAL ROOM: A LITTLE
MOVING TO AND FROM BED TO CHAIR: A LITTLE
MOBILITY SCORE: 18
CLIMB 3 TO 5 STEPS WITH RAILING: A LITTLE
SUGGESTED CMS G CODE MODIFIER MOBILITY: CK
STANDING UP FROM CHAIR USING ARMS: A LITTLE
TURNING FROM BACK TO SIDE WHILE IN FLAT BAD: A LITTLE

## 2019-05-24 ASSESSMENT — GAIT ASSESSMENTS
DISTANCE (FEET): 100
ASSISTIVE DEVICE: FRONT WHEEL WALKER
DEVIATION: BRADYKINETIC;SHUFFLED GAIT;DECREASED BASE OF SUPPORT
GAIT LEVEL OF ASSIST: MINIMAL ASSIST

## 2019-05-24 ASSESSMENT — ACTIVITIES OF DAILY LIVING (ADL): TOILETING: INDEPENDENT

## 2019-05-24 NOTE — PROGRESS NOTES
Report from Carmel RN, pt denies needs, family at bedside, call light within reach, bed at lowest setting will continue to monitor.

## 2019-05-24 NOTE — PROGRESS NOTES
Pt refusing to use call light, educated on the benefits and safety to do so, continues to refuse, bed locked in lowest setting, bed alarm on. Will continue to monitor.

## 2019-05-24 NOTE — PROGRESS NOTES
Telemetry Shift Summary     Rhythm NSR  HR Range 62-90  Ectopy occasional PAC, PVC  Measurements .16/.08/.40

## 2019-05-24 NOTE — PROGRESS NOTES
Patient removed his own IV. No IV medications ordered at this time. Will follow up with MD Montes if IV needs replacing or not.

## 2019-05-24 NOTE — FLOWSHEET NOTE
05/24/19 0707   Interdisciplinary Plan of Care-Goals (Indications)   Bronchodilator Indications History / Diagnosis   RT Assessment of Delivered Medications   Evaluation of Medication Delivery Daily Yes-- Pt /Family has been Instructed in use of Respiratory Medications and Adverse Reactions   SVN Group   #SVN Performed Yes   Given By: Mask   MDI/DPI Group   #MDI/DPI Given MDI/DPI x 1   Chest Exam   Respiration 18   Pulse 65   Breath Sounds   RUL Breath Sounds Clear;Diminished   RML Breath Sounds Diminished   RLL Breath Sounds Diminished   CARROLL Breath Sounds Clear;Diminished   LLL Breath Sounds Diminished   Oximetry   #Pulse Oximetry (Single Determination) Yes   Oxygen   Home O2 Use Prior To Admission? Yes   Home O2 LPM Flow 2 LPM   Home O2 Delivery Method Nasal Cannula   Home O2 Frequency of Use As Needed for SOB   Pulse Oximetry 94 %   O2 (LPM) 2   O2 Daily Delivery Respiratory  Silicone Nasal Cannula

## 2019-05-24 NOTE — ASSESSMENT & PLAN NOTE
Has been noted in the past as well, medical management  He has elected to DC home w hospice, referral placed

## 2019-05-24 NOTE — DISCHARGE PLANNING
Anticipated Discharge Disposition: SNF versus Home     Action: SAVANAH spoke to pt's son, Moi.  He is contemplating pt transitioning to SNF versus home with hospice.  Moi will speak with his father tomorrow morning and see what the pt wants to do.     Barriers to Discharge: SNF versus Hospice.     Plan: F/U with Moi tomorrow.

## 2019-05-24 NOTE — CARE PLAN
Problem: Safety  Goal: Will remain free from injury  Outcome: PROGRESSING SLOWER THAN EXPECTED  Instructed patinet on use of call light, call for assistance when ambulating, frequent checks, offer toileting on a regular basis. Pt refusing to use call bell.       Problem: Knowledge Deficit  Goal: Knowledge of disease process/condition, treatment plan, diagnostic tests, and medications will improve  Outcome: PROGRESSING AS EXPECTED  Discussed plan of care with patient, answered questions best to my ability.

## 2019-05-24 NOTE — PROGRESS NOTES
Telemetry Shift Summary    Rhythm sr  HR Range 70s-80s  Ectopy freq pac rare pvc  Measurements 0.14/0.08/0.40        Normal Values  Rhythm SR  HR Range    Measurements 0.12-0.20 / 0.06-0.10  / 0.30-0.52

## 2019-05-24 NOTE — DISCHARGE PLANNING
Received Choice form at 1250.  Agency/Facility Name: [facility]   Referral sent per Choice form at 1255.

## 2019-05-24 NOTE — CARE PLAN
Problem: Nutritional:  Goal: Achieve adequate nutritional intake  Patient will consume >50% of meals consistently  Outcome: PROGRESSING AS EXPECTED

## 2019-05-24 NOTE — PROGRESS NOTES
Hospital Medicine Daily Progress Note    Date of Service  5/24/2019    Chief Complaint  89 y.o. male admitted 5/22/2019 with CP and SOB    Hospital Course    Hx of coronary artery disease, chronic systolic heart failure, chronic 2 L oxygen dependence due to COPD, physical debility, admitted for shortness of breath and chest tightness.  He was found to have fluid overload as he was not taking Lasix at home.      Interval Problem Update  5/23: Stress test with small area of partial reversibility, medical management.  Tolerating Lasix, improved respiratory status  5/24: Weaned to baseline 2 L, pending placement in SNF versus possibly hospice    Consultants/Specialty  None    Code Status  DNR/DNI    Disposition  Follow-up decision from son regarding plan of care, hospice versus SNF    Review of Systems  Review of Systems   Constitutional: Negative for chills, fever and malaise/fatigue.   HENT: Positive for hearing loss. Negative for sore throat.    Respiratory: Positive for shortness of breath. Negative for cough.    Cardiovascular: Negative for chest pain and palpitations.   Gastrointestinal: Negative for abdominal pain, blood in stool, diarrhea, heartburn, nausea and vomiting.   Genitourinary: Negative for dysuria and frequency.   Musculoskeletal: Negative for back pain and myalgias.   Neurological: Positive for weakness. Negative for dizziness, focal weakness and headaches.   Psychiatric/Behavioral: Positive for memory loss. Negative for depression and hallucinations.   All other systems reviewed and are negative.       Physical Exam  Temp:  [36.2 °C (97.2 °F)-36.7 °C (98.1 °F)] 36.4 °C (97.5 °F)  Pulse:  [65-77] 76  Resp:  [16-18] 18  BP: (108-146)/(52-72) 122/69  SpO2:  [92 %-98 %] 96 %    Physical Exam   Constitutional: He is oriented to person, place, and time. He appears well-developed and well-nourished. No distress.   Thin, elderly   HENT:   Head: Normocephalic.   Mouth/Throat: No oropharyngeal exudate.    Eyes: Pupils are equal, round, and reactive to light. Conjunctivae are normal.   Neck: Normal range of motion. No tracheal deviation present.   Cardiovascular: Normal rate and regular rhythm.    Murmur heard.  Pulmonary/Chest: Effort normal. No respiratory distress. He has no wheezes. He exhibits no tenderness.   Diminished breath sounds throughout   Abdominal: Soft. He exhibits no distension. There is no tenderness. There is no guarding.   Musculoskeletal: Normal range of motion. He exhibits edema (Improved). He exhibits no tenderness.   Lymphadenopathy:     He has no cervical adenopathy.   Neurological: He is alert and oriented to person, place, and time. No cranial nerve deficit.   Skin: Skin is warm and dry. No rash noted.   Psychiatric: He has a normal mood and affect. His behavior is normal.   Nursing note and vitals reviewed.      Fluids    Intake/Output Summary (Last 24 hours) at 05/24/19 1458  Last data filed at 05/24/19 1400   Gross per 24 hour   Intake              570 ml   Output             2550 ml   Net            -1980 ml       Laboratory  Recent Labs      05/22/19   1140  05/24/19   0648   WBC  9.1  10.1   RBC  3.95*  3.72*   HEMOGLOBIN  12.7*  11.8*   HEMATOCRIT  39.5*  36.2*   MCV  100.0*  97.3   MCH  32.2  31.7   MCHC  32.2*  32.6*   RDW  48.6  47.3   PLATELETCT  210  214   MPV  8.4*  8.6*     Recent Labs      05/22/19   1140  05/23/19   0735  05/24/19   0648   SODIUM  130*  131*  134*   POTASSIUM  4.7  3.8  3.4*   CHLORIDE  93*  93*  93*   CO2  28  29  30   GLUCOSE  116*  169*  114*   BUN  21  23*  23*   CREATININE  0.94  0.80  1.00   CALCIUM  8.4  8.0*  8.0*     Recent Labs      05/22/19   1140   APTT  24.8   INR  1.09     Recent Labs      05/22/19   1140   BNPBTYPENAT  749*           Imaging  NM-CARDIAC STRESS TEST   Final Result      EC-ECHOCARDIOGRAM COMPLETE W/O CONT   Final Result      CT-CTA CHEST PULMONARY ARTERY W/ RECONS   Final Result      1.  There is no CT evidence of acute  pulmonary embolism.   2.  Mildly enlarged heart with new small dependent pleural effusions and dependent groundglass and reticular opacities in both lungs most consistent with changes of pulmonary edema.   3.  There are patchy opacities in the left lower lobe which could represent areas of atelectasis or pneumonia.   4.  There is underlying emphysema.   5.  There is atherosclerosis with coronary artery calcification.            DX-CHEST-PORTABLE (1 VIEW)   Final Result      1.  Worsening interstitial infiltrates bilaterally. Differential diagnosis includes pulmonary edema, atypical infection and progression of interstitial lung disease.      2.  Cardiomegaly.      3.  Somewhat more prominent consolidation within the left lung base possibly representing pneumonia.           Assessment/Plan  Acute on chronic respiratory failure with hypoxia (HCC)- (present on admission)   Assessment & Plan    He wears 2 L at baseline, was requiring 3-4 on admission has improved with Lasix  Change to Lasix 20 p.o. daily to avoid overdiuresis     Chronic obstructive pulmonary disease with acute exacerbation (HCC)- (present on admission)   Assessment & Plan    He has markedly diminished breath sounds unclear what his baseline is RT protocol has been ordered  There is no overt wheezing, continue home prednisone  Duo nebs  Symbicort  He wears 2 L of oxygen at home, continue     Severe protein-calorie malnutrition (HCC)   Assessment & Plan    BMI is less than 19  Family is considering hospice care due to his advanced age  DC plan is pending their decision     Coronary artery disease   Assessment & Plan    He has had a cardiac cath in 2014 but no stents were placed.  Since then he has been followed by cardiology and is medically managed.  On his stress test he had a small area of reversible ischemia however troponins were normal.  I ran this by cardiology and due to his age, medical management will be recommended  Add atorvastatin  Add Coreg  6.25 twice daily  Continue Lasix 20  Continue enalapril 10  Aspirin     Multiple rib fractures   Assessment & Plan    Present since March 2019 following fall  Patient has been declining since discharge from [facility] care 3 weeks ago  PT eval     Acute on chronic respiratory failure with hypoxemia (HCC)   Assessment & Plan    Due to fluid overload, baseline 2L from COPD  D-dimer was elevated  CTA showed no clot however but multiple rib fractures which were present in March 2019.  Change lasix to PO     D-dimer, elevated   Assessment & Plan    No PE     Acute exacerbation of CHF (congestive heart failure) (HCC)- (present on admission)   Assessment & Plan    Elevated BNP and crackles on admission, he was not previously taking Lasix despite known EF of 45%.  I reviewed the repeat echo and it shows EF of 55%, suspect element of pulmonary hypertension  He has improved with Lasix 20 IV daily  Changed to Lasix 20 p.o. daily  Low-salt diet  Continue Coreg, Vasotec     Abnormal stress test- (present on admission)   Assessment & Plan    Has been noted in the past as well, medical management          VTE prophylaxis: Lovenox

## 2019-05-24 NOTE — FLOWSHEET NOTE
05/23/19 1921   Interdisciplinary Plan of Care-Goals (Indications)   Bronchodilator Indications History / Diagnosis   Interdisciplinary Plan of Care-Outcomes    Bronchodilator Outcome Diminished Wheezing and Volume of Air Movement Increased   Education   Education Yes - Pt. / Family has been Instructed in use of Respiratory Equipment;Yes - Pt. / Family has been Instructed in use of Respiratory Medications and Adverse Reactions   RT Assessment of Delivered Medications   Evaluation of Medication Delivery Daily Yes-- Pt /Family has been Instructed in use of Respiratory Medications and Adverse Reactions   SVN Group   #SVN Performed Yes   Given By: Mask   MDI/DPI Group   #MDI/DPI Given MDI/DPI x 1   Respiratory WDL   Respiratory (WDL) X   Chest Exam   Respiration 18   Pulse 77   Breath Sounds   Pre/Post Intervention Pre Intervention Assessment   RUL Breath Sounds Diminished   RML Breath Sounds Diminished   RLL Breath Sounds Diminished   CARROLL Breath Sounds Diminished   LLL Breath Sounds Diminished   Oximetry   #Pulse Oximetry (Single Determination) Yes   Oxygen   Home O2 Use Prior To Admission? Yes   Home O2 LPM Flow 2 LPM   Home O2 Delivery Method Nasal Cannula   Pulse Oximetry 97 %   O2 (LPM) 3   O2 Daily Delivery Respiratory  Silicone Nasal Cannula

## 2019-05-24 NOTE — THERAPY
"Occupational Therapy Evaluation completed.   Functional Status: Admitted with SOB, chest pressure. A&Ox3. Lummi. Pleasant and cooperative. Performs seated grooming with SBA. Donns socks with Jolie. Scooting and STS with Sup, FWW. Uses urinal with Sup. Walks ~50' with Sup,fww,O2. Fatigues quickly. ADL transfer with Sup. UIC post session.     Plan of Care: Will benefit from Occupational Therapy 3 times per week  Discharge Recommendations:  Equipment: Tub Transfer Bench.  Discharge TBD - to a transitional care facility for continued skilled therapy services vs. home with hospice.  Lives with son.   See \"Rehab Therapy-Acute\" Patient Summary Report for complete documentation.    "

## 2019-05-24 NOTE — ASSESSMENT & PLAN NOTE
He has had a cardiac cath in 2014 but no stents were placed.  Since then he has been followed by cardiology and is medically managed.  On his stress test he had a small area of reversible ischemia however troponins were normal.  I ran this by cardiology and due to his age, medical management will be recommended  Add atorvastatin  Add Coreg 6.25 twice daily  Continue Lasix 20  Continue enalapril 10  Aspirin

## 2019-05-24 NOTE — PROGRESS NOTES
Telemetry Shift Summary    Rhythm SR  HR Range 60-80s  Ectopy Occ PVCs, Occ PACs  Measurements .16/.08/.44          Normal Values  Rhythm SR  HR Range    Measurements 0.12-0.20 / 0.06-0.10  / 0.30-0.52

## 2019-05-24 NOTE — PROGRESS NOTES
Hospital Medicine Daily Progress Note    Date of Service  5/23/2019    Chief Complaint  89 y.o. male admitted 5/22/2019 with CP and SOB    Hospital Course    Hx of coronary artery disease, chronic systolic heart failure, chronic 2 L oxygen dependence due to COPD, physical debility, admitted for shortness of breath and chest tightness.  He was found to have fluid overload as he was not taking Lasix at home.      Interval Problem Update  5/23: Stress test with small area of partial reversibility, medical management.  Tolerating Lasix, improved respiratory status    Consultants/Specialty  None    Code Status  DNR/DNI    Disposition  Follow-up decision from son regarding plan of care, hospice versus SNF    Review of Systems  Review of Systems   Constitutional: Negative for chills, fever and malaise/fatigue.   HENT: Negative for hearing loss and sore throat.    Respiratory: Positive for shortness of breath. Negative for cough.    Cardiovascular: Positive for leg swelling (Improved). Negative for chest pain and palpitations.   Gastrointestinal: Negative for abdominal pain, blood in stool, diarrhea, heartburn, nausea and vomiting.   Genitourinary: Negative for dysuria and frequency.   Musculoskeletal: Negative for back pain and myalgias.   Neurological: Positive for weakness. Negative for dizziness, focal weakness and headaches.   Psychiatric/Behavioral: Positive for memory loss. Negative for depression and hallucinations.   All other systems reviewed and are negative.       Physical Exam  Temp:  [36.2 °C (97.2 °F)-36.7 °C (98.1 °F)] 36.4 °C (97.5 °F)  Pulse:  [65-77] 76  Resp:  [16-18] 18  BP: (108-146)/(52-72) 122/69  SpO2:  [92 %-98 %] 96 %    Physical Exam   Constitutional: He is oriented to person, place, and time. He appears well-developed and well-nourished. No distress.   Thin, elderly   HENT:   Head: Normocephalic.   Mouth/Throat: No oropharyngeal exudate.   Eyes: Pupils are equal, round, and reactive to light.  Conjunctivae are normal.   Neck: Normal range of motion. No tracheal deviation present.   Cardiovascular: Normal rate and regular rhythm.    Murmur heard.  Pulmonary/Chest: Effort normal. No respiratory distress. He has no wheezes. He exhibits no tenderness.   Diminished breath sounds, faint crackles at bases   Abdominal: Soft. He exhibits no distension. There is no tenderness. There is no guarding.   Musculoskeletal: Normal range of motion. He exhibits edema. He exhibits no tenderness.   Lymphadenopathy:     He has no cervical adenopathy.   Neurological: He is alert and oriented to person, place, and time. No cranial nerve deficit.   Skin: Skin is warm and dry. No rash noted.   Psychiatric: He has a normal mood and affect. His behavior is normal.   Nursing note and vitals reviewed.      Fluids    Intake/Output Summary (Last 24 hours) at 05/24/19 1503  Last data filed at 05/24/19 1400   Gross per 24 hour   Intake              570 ml   Output             2150 ml   Net            -1580 ml       Laboratory  Recent Labs      05/22/19   1140  05/24/19   0648   WBC  9.1  10.1   RBC  3.95*  3.72*   HEMOGLOBIN  12.7*  11.8*   HEMATOCRIT  39.5*  36.2*   MCV  100.0*  97.3   MCH  32.2  31.7   MCHC  32.2*  32.6*   RDW  48.6  47.3   PLATELETCT  210  214   MPV  8.4*  8.6*     Recent Labs      05/22/19   1140  05/23/19   0735  05/24/19   0648   SODIUM  130*  131*  134*   POTASSIUM  4.7  3.8  3.4*   CHLORIDE  93*  93*  93*   CO2  28  29  30   GLUCOSE  116*  169*  114*   BUN  21  23*  23*   CREATININE  0.94  0.80  1.00   CALCIUM  8.4  8.0*  8.0*     Recent Labs      05/22/19   1140   APTT  24.8   INR  1.09     Recent Labs      05/22/19   1140   BNPBTYPENAT  749*           Imaging  NM-CARDIAC STRESS TEST   Final Result      EC-ECHOCARDIOGRAM COMPLETE W/O CONT   Final Result      CT-CTA CHEST PULMONARY ARTERY W/ RECONS   Final Result      1.  There is no CT evidence of acute pulmonary embolism.   2.  Mildly enlarged heart with new  small dependent pleural effusions and dependent groundglass and reticular opacities in both lungs most consistent with changes of pulmonary edema.   3.  There are patchy opacities in the left lower lobe which could represent areas of atelectasis or pneumonia.   4.  There is underlying emphysema.   5.  There is atherosclerosis with coronary artery calcification.            DX-CHEST-PORTABLE (1 VIEW)   Final Result      1.  Worsening interstitial infiltrates bilaterally. Differential diagnosis includes pulmonary edema, atypical infection and progression of interstitial lung disease.      2.  Cardiomegaly.      3.  Somewhat more prominent consolidation within the left lung base possibly representing pneumonia.           Assessment/Plan  Acute on chronic respiratory failure with hypoxia (HCC)- (present on admission)   Assessment & Plan    He wears 2 L at baseline, was requiring 3-4 on admission has improved with Lasix  Change to Lasix 20 p.o. daily to avoid overdiuresis     Chronic obstructive pulmonary disease with acute exacerbation (HCC)- (present on admission)   Assessment & Plan    He has markedly diminished breath sounds unclear what his baseline is RT protocol has been ordered  There is no overt wheezing, continue home prednisone  Duo nebs  Symbicort  He wears 2 L of oxygen at home, continue     Severe protein-calorie malnutrition (HCC)   Assessment & Plan    BMI is less than 19  Family is considering hospice care due to his advanced age  DC plan is pending their decision     Coronary artery disease   Assessment & Plan    He has had a cardiac cath in 2014 but no stents were placed.  Since then he has been followed by cardiology and is medically managed.  On his stress test he had a small area of reversible ischemia however troponins were normal.  I ran this by cardiology and due to his age, medical management will be recommended  Add atorvastatin  Add Coreg 6.25 twice daily  Continue Lasix 20  Continue enalapril  10  Aspirin     Multiple rib fractures   Assessment & Plan    Present since March 2019 following fall  Patient has been declining since discharge from [facility] 3 weeks ago  PT eval     Acute on chronic respiratory failure with hypoxemia (HCC)   Assessment & Plan    Due to fluid overload, baseline 2L from COPD  D-dimer was elevated  CTA showed no clot however but multiple rib fractures which were present in March 2019.  Change lasix to PO     D-dimer, elevated   Assessment & Plan    No PE     Acute exacerbation of CHF (congestive heart failure) (HCC)- (present on admission)   Assessment & Plan    Elevated BNP and crackles on admission, he was not previously taking Lasix despite known EF of 45%.  I reviewed the repeat echo and it shows EF of 55%, suspect element of pulmonary hypertension  He has improved with Lasix 20 IV daily  Changed to Lasix 20 p.o. daily  Low-salt diet  Continue Coreg, Vasotec     Abnormal stress test- (present on admission)   Assessment & Plan    Has been noted in the past as well, medical management          VTE prophylaxis: Lovenox

## 2019-05-24 NOTE — FLOWSHEET NOTE
05/24/19 1143   RT Assessment of Delivered Medications   Evaluation of Medication Delivery Daily Yes-- Pt /Family has been Instructed in use of Respiratory Medications and Adverse Reactions   SVN Group   #SVN Performed Yes   Given By: Mask   Chest Exam   Respiration 18   Pulse 66   Breath Sounds   RUL Breath Sounds Clear;Diminished   RML Breath Sounds Diminished   RLL Breath Sounds Diminished   CARROLL Breath Sounds Clear;Diminished   LLL Breath Sounds Diminished   Oxygen   Pulse Oximetry 96 %   O2 (LPM) 2   O2 Daily Delivery Respiratory  Silicone Nasal Cannula

## 2019-05-24 NOTE — DIETARY
"Nutrition services: Day 2 of admit. Jhon Begum is a 89 y.o. male with admitting DX of: Acute on chronic respiratory failure with hypoxemia, CHF, COPD.    Consult received for BMI <19 (=18.97) on admission.    Assessment:  Height: 172.7 cm (5' 8\")  Weight: 56.6 kg (124 lb 12.5 oz)  Body mass index is 18.97 kg/m² - underweight   Diet/Intake: 2g Na+ /1L fluid restriction, PO intake <25% x1, 25-50% x1, 50-75% x2, % x1    Evaluation:   1. Pt states that he used to weigh 150# a few years ago but that he has been gradually losing wt 2' being less active. He notes that when he is very active, he cannot breath well, and this has made it difficult to eat well.  2. His son prepares all his meals for him at home. His diet sounds like it has good variety. Pt encouraged to add Ensure, Boost, [place] instant breakfast, or protein shakes into his routine at home to help him consume more protein in his diet. Pt verbalizes understanding of how important protein is to maintain his lean mass. He points to his arms and remarks how thin they are.  3. He states that his more recent UBW is 130# which is what he has been weighing 'lately'. Wt is 124# by bed scale 5/24 (4.6% wt loss over unclear time).  4. He would like to customize his breakfast the way that he likes it (eggs, toast, fruit, coffee). He does not wish to add snacks but he is amenable to try a few choice items with his lunch each day. His intake is currently not too bad - will monitor and make suggestions going forward.  5. Labs: Na+ 134, K+ 3.4, Glc 114 - note steroids, Phos 3.6  6. Meds/fluids: Lipitor, Lasix (decreased from 40mg to 20mg Q day), Prednisone, Pericolace  7. GI/: last BM 3/21 - receiving bowel meds    Malnutrition Risk: At risk 2' ongoing wt loss over prior few years d/t increased work of breathing/decreased activity in the setting of CHF and O2 dependent COPD, however, unable to meet criteria at this time.    Recommendations/Plan:  1. Continue " current diet. Custom breakfast to be more like home routine. Protein-dense meal items with lunch daily.   2. Encourage intake of meals  3. Document intake of all meals as % taken in ADL's to provide interdisciplinary communication across all shifts.   4. Monitor weight.  5. Nutrition rep will continue to see patient for ongoing meal and snack preferences.     RD monitoring.

## 2019-05-24 NOTE — ASSESSMENT & PLAN NOTE
He wears 2 L at baseline, was requiring 3-4 on admission has improved with Lasix  Continue Lasix 20 p.o. daily to avoid overdiuresis

## 2019-05-25 ENCOUNTER — HOME CARE VISIT (OUTPATIENT)
Dept: HOME HEALTH SERVICES | Facility: HOME HEALTHCARE | Age: 84
End: 2019-05-25
Payer: MEDICARE

## 2019-05-25 LAB
ALBUMIN SERPL BCP-MCNC: 2.9 G/DL (ref 3.2–4.9)
BASOPHILS # BLD AUTO: 0.3 % (ref 0–1.8)
BASOPHILS # BLD: 0.03 K/UL (ref 0–0.12)
BUN SERPL-MCNC: 25 MG/DL (ref 8–22)
CALCIUM SERPL-MCNC: 7.8 MG/DL (ref 8.4–10.2)
CHLORIDE SERPL-SCNC: 96 MMOL/L (ref 96–112)
CO2 SERPL-SCNC: 31 MMOL/L (ref 20–33)
CREAT SERPL-MCNC: 0.84 MG/DL (ref 0.5–1.4)
EOSINOPHIL # BLD AUTO: 0.24 K/UL (ref 0–0.51)
EOSINOPHIL NFR BLD: 2.4 % (ref 0–6.9)
ERYTHROCYTE [DISTWIDTH] IN BLOOD BY AUTOMATED COUNT: 47.4 FL (ref 35.9–50)
GLUCOSE SERPL-MCNC: 98 MG/DL (ref 65–99)
HCT VFR BLD AUTO: 35.6 % (ref 42–52)
HGB BLD-MCNC: 11.5 G/DL (ref 14–18)
IMM GRANULOCYTES # BLD AUTO: 0.16 K/UL (ref 0–0.11)
IMM GRANULOCYTES NFR BLD AUTO: 1.6 % (ref 0–0.9)
LYMPHOCYTES # BLD AUTO: 0.54 K/UL (ref 1–4.8)
LYMPHOCYTES NFR BLD: 5.5 % (ref 22–41)
MCH RBC QN AUTO: 31.4 PG (ref 27–33)
MCHC RBC AUTO-ENTMCNC: 32.3 G/DL (ref 33.7–35.3)
MCV RBC AUTO: 97.3 FL (ref 81.4–97.8)
MONOCYTES # BLD AUTO: 0.8 K/UL (ref 0–0.85)
MONOCYTES NFR BLD AUTO: 8.1 % (ref 0–13.4)
NEUTROPHILS # BLD AUTO: 8.05 K/UL (ref 1.82–7.42)
NEUTROPHILS NFR BLD: 82.1 % (ref 44–72)
NRBC # BLD AUTO: 0 K/UL
NRBC BLD-RTO: 0 /100 WBC
PHOSPHATE SERPL-MCNC: 3 MG/DL (ref 2.5–4.5)
PLATELET # BLD AUTO: 187 K/UL (ref 164–446)
PMV BLD AUTO: 8.6 FL (ref 9–12.9)
POTASSIUM SERPL-SCNC: 4.1 MMOL/L (ref 3.6–5.5)
RBC # BLD AUTO: 3.66 M/UL (ref 4.7–6.1)
SODIUM SERPL-SCNC: 133 MMOL/L (ref 135–145)
WBC # BLD AUTO: 9.8 K/UL (ref 4.8–10.8)

## 2019-05-25 PROCEDURE — 94760 N-INVAS EAR/PLS OXIMETRY 1: CPT

## 2019-05-25 PROCEDURE — 700102 HCHG RX REV CODE 250 W/ 637 OVERRIDE(OP): Performed by: INTERNAL MEDICINE

## 2019-05-25 PROCEDURE — 85025 COMPLETE CBC W/AUTO DIFF WBC: CPT

## 2019-05-25 PROCEDURE — 700111 HCHG RX REV CODE 636 W/ 250 OVERRIDE (IP): Performed by: INTERNAL MEDICINE

## 2019-05-25 PROCEDURE — A9270 NON-COVERED ITEM OR SERVICE: HCPCS | Performed by: INTERNAL MEDICINE

## 2019-05-25 PROCEDURE — 700101 HCHG RX REV CODE 250: Performed by: INTERNAL MEDICINE

## 2019-05-25 PROCEDURE — 80069 RENAL FUNCTION PANEL: CPT

## 2019-05-25 PROCEDURE — 99232 SBSQ HOSP IP/OBS MODERATE 35: CPT | Performed by: INTERNAL MEDICINE

## 2019-05-25 PROCEDURE — 94640 AIRWAY INHALATION TREATMENT: CPT

## 2019-05-25 PROCEDURE — 770006 HCHG ROOM/CARE - MED/SURG/GYN SEMI*

## 2019-05-25 RX ORDER — BUDESONIDE AND FORMOTEROL FUMARATE DIHYDRATE 160; 4.5 UG/1; UG/1
2 AEROSOL RESPIRATORY (INHALATION) 2 TIMES DAILY
Status: DISCONTINUED | OUTPATIENT
Start: 2019-05-26 | End: 2019-05-28 | Stop reason: HOSPADM

## 2019-05-25 RX ORDER — IPRATROPIUM BROMIDE AND ALBUTEROL SULFATE 2.5; .5 MG/3ML; MG/3ML
3 SOLUTION RESPIRATORY (INHALATION)
Status: DISCONTINUED | OUTPATIENT
Start: 2019-05-25 | End: 2019-05-28 | Stop reason: HOSPADM

## 2019-05-25 RX ADMIN — ENALAPRIL MALEATE 10 MG: 5 TABLET ORAL at 05:45

## 2019-05-25 RX ADMIN — BUDESONIDE AND FORMOTEROL FUMARATE DIHYDRATE 2 PUFF: 160; 4.5 AEROSOL RESPIRATORY (INHALATION) at 07:05

## 2019-05-25 RX ADMIN — IPRATROPIUM BROMIDE AND ALBUTEROL SULFATE 3 ML: .5; 3 SOLUTION RESPIRATORY (INHALATION) at 19:41

## 2019-05-25 RX ADMIN — ASPIRIN 81 MG: 81 TABLET, COATED ORAL at 05:45

## 2019-05-25 RX ADMIN — CARVEDILOL 6.25 MG: 6.25 TABLET, FILM COATED ORAL at 08:44

## 2019-05-25 RX ADMIN — ATORVASTATIN CALCIUM 40 MG: 40 TABLET, FILM COATED ORAL at 17:57

## 2019-05-25 RX ADMIN — FUROSEMIDE 20 MG: 20 TABLET ORAL at 05:46

## 2019-05-25 RX ADMIN — CARVEDILOL 6.25 MG: 6.25 TABLET, FILM COATED ORAL at 17:57

## 2019-05-25 RX ADMIN — IPRATROPIUM BROMIDE AND ALBUTEROL SULFATE 3 ML: .5; 3 SOLUTION RESPIRATORY (INHALATION) at 14:51

## 2019-05-25 RX ADMIN — PREDNISONE 10 MG: 20 TABLET ORAL at 05:46

## 2019-05-25 RX ADMIN — BUDESONIDE AND FORMOTEROL FUMARATE DIHYDRATE 2 PUFF: 160; 4.5 AEROSOL RESPIRATORY (INHALATION) at 19:42

## 2019-05-25 RX ADMIN — ENALAPRIL MALEATE 10 MG: 5 TABLET ORAL at 17:57

## 2019-05-25 RX ADMIN — IPRATROPIUM BROMIDE AND ALBUTEROL SULFATE 3 ML: .5; 3 SOLUTION RESPIRATORY (INHALATION) at 07:05

## 2019-05-25 RX ADMIN — IPRATROPIUM BROMIDE AND ALBUTEROL SULFATE 3 ML: .5; 3 SOLUTION RESPIRATORY (INHALATION) at 10:45

## 2019-05-25 ASSESSMENT — ENCOUNTER SYMPTOMS
ABDOMINAL PAIN: 0
HEARTBURN: 0
COUGH: 0
HEADACHES: 0
NAUSEA: 0
VOMITING: 0
HALLUCINATIONS: 0
DEPRESSION: 0
DIARRHEA: 0
SORE THROAT: 0
MYALGIAS: 0
PALPITATIONS: 0
MEMORY LOSS: 1
FOCAL WEAKNESS: 0
FEVER: 0
BLOOD IN STOOL: 0
DIZZINESS: 0
SHORTNESS OF BREATH: 1
BACK PAIN: 0
CHILLS: 0
WEAKNESS: 1

## 2019-05-25 ASSESSMENT — LIFESTYLE VARIABLES: EVER_SMOKED: YES

## 2019-05-25 NOTE — PROGRESS NOTES
Hospital Medicine Daily Progress Note    Date of Service  5/25/2019    Chief Complaint  89 y.o. male admitted 5/22/2019 with CP and SOB    Hospital Course    Hx of coronary artery disease, chronic systolic heart failure, chronic 2 L oxygen dependence due to COPD, physical debility, admitted for shortness of breath and chest tightness.  He was found to have fluid overload as he was not taking Lasix at home.      Interval Problem Update  5/23: Stress test with small area of partial reversibility, medical management.  Tolerating Lasix, improved respiratory status  5/24: Weaned to baseline 2 L, pending placement in SNF versus possibly hospice  5/25: Tolerating lasix, boost added for low albumin.  Pending SNF.  Breathing unchanged.    Consultants/Specialty  None    Code Status  DNR/DNI     Disposition  Follow-up SNF    Review of Systems  Review of Systems   Constitutional: Positive for malaise/fatigue. Negative for chills and fever.   HENT: Positive for hearing loss. Negative for sore throat.    Respiratory: Positive for shortness of breath. Negative for cough.    Cardiovascular: Negative for chest pain and palpitations.   Gastrointestinal: Negative for abdominal pain, blood in stool, diarrhea, heartburn, nausea and vomiting.   Genitourinary: Negative for dysuria and frequency.   Musculoskeletal: Negative for back pain and myalgias.   Neurological: Positive for weakness. Negative for dizziness, focal weakness and headaches.   Psychiatric/Behavioral: Positive for memory loss. Negative for depression and hallucinations.   All other systems reviewed and are negative.       Physical Exam  Temp:  [36.4 °C (97.6 °F)-36.9 °C (98.4 °F)] 36.9 °C (98.4 °F)  Pulse:  [72-93] 85  Resp:  [18-24] 24  BP: (122-145)/(65-83) 145/83  SpO2:  [93 %-99 %] 97 %    Physical Exam   Constitutional: He is oriented to person, place, and time. He appears well-developed and well-nourished. No distress.   Thin, elderly   HENT:   Head: Normocephalic.    Mouth/Throat: No oropharyngeal exudate.   Eyes: Pupils are equal, round, and reactive to light. Conjunctivae are normal.   Neck: Normal range of motion. No tracheal deviation present.   Cardiovascular: Normal rate and regular rhythm.    Murmur heard.  Pulmonary/Chest: Effort normal. He has no rales. He exhibits no tenderness.   Diminished breath sounds throughout, palpable ribs, pursed lipped breathing   Abdominal: Soft. There is no rebound and no guarding.   Musculoskeletal: Normal range of motion. He exhibits no edema or tenderness.   Lymphadenopathy:     He has no cervical adenopathy.   Neurological: He is alert and oriented to person, place, and time. No cranial nerve deficit.   Skin: Skin is warm and dry. No rash noted.   Psychiatric: He has a normal mood and affect. His behavior is normal.   Nursing note and vitals reviewed.      Fluids    Intake/Output Summary (Last 24 hours) at 05/25/19 1335  Last data filed at 05/25/19 1227   Gross per 24 hour   Intake              600 ml   Output             1100 ml   Net             -500 ml       Laboratory  Recent Labs      05/24/19   0648  05/25/19   0549   WBC  10.1  9.8   RBC  3.72*  3.66*   HEMOGLOBIN  11.8*  11.5*   HEMATOCRIT  36.2*  35.6*   MCV  97.3  97.3   MCH  31.7  31.4   MCHC  32.6*  32.3*   RDW  47.3  47.4   PLATELETCT  214  187   MPV  8.6*  8.6*     Recent Labs      05/23/19   0735  05/24/19   0648  05/25/19   0549   SODIUM  131*  134*  133*   POTASSIUM  3.8  3.4*  4.1   CHLORIDE  93*  93*  96   CO2  29  30  31   GLUCOSE  169*  114*  98   BUN  23*  23*  25*   CREATININE  0.80  1.00  0.84   CALCIUM  8.0*  8.0*  7.8*                   Imaging  NM-CARDIAC STRESS TEST   Final Result      EC-ECHOCARDIOGRAM COMPLETE W/O CONT   Final Result      CT-CTA CHEST PULMONARY ARTERY W/ RECONS   Final Result      1.  There is no CT evidence of acute pulmonary embolism.   2.  Mildly enlarged heart with new small dependent pleural effusions and dependent groundglass and  reticular opacities in both lungs most consistent with changes of pulmonary edema.   3.  There are patchy opacities in the left lower lobe which could represent areas of atelectasis or pneumonia.   4.  There is underlying emphysema.   5.  There is atherosclerosis with coronary artery calcification.            DX-CHEST-PORTABLE (1 VIEW)   Final Result      1.  Worsening interstitial infiltrates bilaterally. Differential diagnosis includes pulmonary edema, atypical infection and progression of interstitial lung disease.      2.  Cardiomegaly.      3.  Somewhat more prominent consolidation within the left lung base possibly representing pneumonia.           Assessment/Plan  Acute on chronic respiratory failure with hypoxia (HCC)- (present on admission)   Assessment & Plan    He wears 2 L at baseline, was requiring 3-4 on admission has improved with Lasix  Continue Lasix 20 p.o. daily to avoid overdiuresis     Chronic obstructive pulmonary disease with acute exacerbation (HCC)- (present on admission)   Assessment & Plan    He has markedly diminished breath sounds unclear what his baseline is RT protocol has been ordered  There is no overt wheezing, continue home prednisone  Duo nebs  Symbicort  He wears 2 L of oxygen at home, continue     Severe protein-calorie malnutrition (HCC)   Assessment & Plan    BMI is less than 19  Add boost TID     Coronary artery disease   Assessment & Plan    He has had a cardiac cath in 2014 but no stents were placed.  Since then he has been followed by cardiology and is medically managed.  On his stress test he had a small area of reversible ischemia however troponins were normal.  I ran this by cardiology and due to his age, medical management will be recommended  Add atorvastatin  Add Coreg 6.25 twice daily  Continue Lasix 20  Continue enalapril 10  Aspirin     Multiple rib fractures   Assessment & Plan    Present since March 2019 following fall  Patient has been declining [facility]  PT eval     Acute on chronic respiratory failure with hypoxemia (HCC)   Assessment & Plan    Due to fluid overload, baseline 2L from COPD  D-dimer was elevated  CTA showed no clot however but multiple rib fractures which were present in March 2019.  PO lasix     D-dimer, elevated   Assessment & Plan    No PE     Acute exacerbation of CHF (congestive heart failure) (HCC)- (present on admission)   Assessment & Plan    Elevated BNP and crackles on admission, he was not previously taking Lasix despite known EF of 45%.  I reviewed the repeat echo and it shows EF of 55%, suspect element of pulmonary hypertension  He has improved with Lasix 20 IV daily  Changed to Lasix 20 p.o. daily  Low-salt diet  Continue Coreg, Vasotec     Abnormal stress test- (present on admission)   Assessment & Plan    Has been noted in the past as well, medical management          VTE prophylaxis: Lovenox

## 2019-05-25 NOTE — DISCHARGE PLANNING
Anticipated Discharge Disposition: Home with hospice    Action: Pt and pt's family have chosen to go home with [facility].  SAVANAH faxed choice form to DANILO [facility].  SAVANAH requested order from MD.     Barriers to Discharge: acceptance    Plan: F/U on referral

## 2019-05-25 NOTE — DISCHARGE PLANNING
Per verbal request from [facility], referrals sent to [facility] via fax @ 1973.

## 2019-05-25 NOTE — CARE PLAN
Problem: Safety  Goal: Will remain free from falls  Outcome: PROGRESSING AS EXPECTED    Intervention: Assess risk factors for falls   05/24/19 2138   OTHER   Fall Risk High Risk to Fall - 2 or more points    Mobility Status Assessment 1-1 Healthcare Provider Required for Assistance with Ambulation & Transfer   History of fall 2   Pt Calls for Assistance Yes     Intervention: Implement fall precautions   05/24/19 2138   OTHER   Environmental Precautions Treaded Slipper Socks on Patient;Personal Belongings, Wastebasket, Call Bell etc. in Easy Reach;Report Given to Other Health Care Providers Regarding Fall Risk;Bed in Low Position;Communication Sign for Patients & Families;Mobility Assessed & Appropriate Sign Placed   Bed Alarm Yes - Alarm On         Problem: Venous Thromboembolism (VTW)/Deep Vein Thrombosis (DVT) Prevention:  Goal: Patient will participate in Venous Thrombosis (VTE)/Deep Vein Thrombosis (DVT)Prevention Measures  Outcome: PROGRESSING AS EXPECTED   05/24/19 2138   OTHER   Pharmacologic Prophylaxis Used LMWH: Enoxaparin(Lovenox)       Problem: Fluid Volume:  Goal: Will maintain balanced intake and output  Outcome: PROGRESSING AS EXPECTED  1  Intervention: Monitor, educate, and encourage compliance with therapeutic intake of liquids  1000mL fluid restriction.

## 2019-05-25 NOTE — PROGRESS NOTES
Telemetry Shift Summary    Rhythm SR  HR Range 60-80s  Ectopy frequent PVC, Frequent PAC  Measurements 16/08/36        Normal Values  Rhythm SR  HR Range    Measurements 0.12-0.20 / 0.06-0.10  / 0.30-0.52

## 2019-05-25 NOTE — DISCHARGE PLANNING
Anticipated Discharge Disposition: SNF    Action: SW contacted [facility] and they are     Barriers to Discharge: ***    Plan: ***

## 2019-05-25 NOTE — PROGRESS NOTES
Report received from Na MCGILL. Plan of care discussed. Safety precautions in place. Patient resting in bed with no additional needs at this time.

## 2019-05-25 NOTE — FLOWSHEET NOTE
05/25/19 1045   SVN Group   #SVN Performed Yes   Given By: Mask   Chest Exam   Work Of Breathing / Effort Mild   Respiration (!) 24   Pulse 85   Breath Sounds   RUL Breath Sounds Clear;Diminished   RML Breath Sounds Clear;Diminished   RLL Breath Sounds Diminished   CARROLL Breath Sounds Clear;Diminished   LLL Breath Sounds Diminished   Oxygen   Pulse Oximetry 97 %   O2 (LPM) 2   O2 Daily Delivery Respiratory  Silicone Nasal Cannula

## 2019-05-25 NOTE — DISCHARGE PLANNING
Anticipated Discharge Disposition: SNF    Action: SW spoke to Moi.  He would like his father to transition to SNF prior to returning home.  He chose [facility].  SW updated [facility].     Barriers to Discharge: Acceptance    Plan: F/U on referrals.

## 2019-05-25 NOTE — FLOWSHEET NOTE
05/24/19 1943   Events/Summary/Plan   Non-Invasive Resp Device Site Inspection Completed Intact   Interdisciplinary Plan of Care-Goals (Indications)   Bronchodilator Indications History / Diagnosis   Interdisciplinary Plan of Care-Outcomes    Bronchodilator Outcome Patient at Stable Baseline   Education   Education Yes - Pt. / Family has been Instructed in use of Respiratory Equipment;Yes - Pt. / Family has been Instructed in use of Respiratory Medications and Adverse Reactions   RT Assessment of Delivered Medications   Evaluation of Medication Delivery Daily Yes-- Pt /Family has been Instructed in use of Respiratory Medications and Adverse Reactions   SVN Group   #SVN Performed Yes   Given By: Mask   MDI/DPI Group   #MDI/DPI Given MDI/DPI x 1   Respiratory WDL   Respiratory (WDL) WDL   Chest Exam   Work Of Breathing / Effort Mild   Respiration 18   Pulse 77   Breath Sounds   Pre/Post Intervention Pre Intervention Assessment   RUL Breath Sounds Clear   RML Breath Sounds Clear   RLL Breath Sounds Diminished   CARROLL Breath Sounds Clear   LLL Breath Sounds Diminished   Oximetry   #Pulse Oximetry (Single Determination) Yes   Oxygen   Pulse Oximetry 93 %   O2 (LPM) 2   O2 Daily Delivery Respiratory  Silicone Nasal Cannula

## 2019-05-25 NOTE — CARE PLAN
Problem: Safety  Goal: Will remain free from falls  Outcome: PROGRESSING AS EXPECTED  Discussed safety measures, verbalized understanding. Fall precautions in place, bed alarm on, and call lights w/in reach.  Will continue to monitor.    Problem: Discharge Barriers/Planning  Goal: Patient's continuum of care needs will be met  Outcome: PROGRESSING AS EXPECTED  Discussed disease process, POC, medications.  Verbalized understanding. Will continue to monitor.

## 2019-05-25 NOTE — DISCHARGE PLANNING
Anticipated Discharge Disposition: SNF    Action: [facility] has no beds available today.  SAVANAH LM for admissions with [facility].     SAVANAH spoke to pt's son, Moi, he would like referrals sent to [facility]. SAVANAH updated [facility].     Barriers to Discharge: Acceptance to SNF    Plan: F/U with [facility] and [facility].

## 2019-05-25 NOTE — FLOWSHEET NOTE
05/25/19 0705   Interdisciplinary Plan of Care-Goals (Indications)   Bronchodilator Indications History / Diagnosis   SVN Group   #SVN Performed Yes   Given By: Mask   MDI/DPI Group   #MDI/DPI Given MDI/DPI x 1   Chest Exam   Respiration 20   Pulse 82   Breath Sounds   RUL Breath Sounds Clear   RML Breath Sounds Diminished   RLL Breath Sounds Diminished   CARROLL Breath Sounds Clear   LLL Breath Sounds Diminished   Oximetry   #Pulse Oximetry (Single Determination) Yes   Oxygen   Home O2 Use Prior To Admission? Yes   Home O2 LPM Flow 2 LPM   Home O2 Delivery Method Nasal Cannula   Home O2 Frequency of Use As Needed for SOB   Pulse Oximetry 94 %   O2 (LPM) 2   O2 Daily Delivery Respiratory  Silicone Nasal Cannula

## 2019-05-25 NOTE — PROGRESS NOTES
Received report from Sondra MCGILL, pt status and POC discussed. Pt lying in bed with family at bedside. Alert, calm with unlabored breathing. Bed alarm on, safety precautions in place.

## 2019-05-25 NOTE — PROGRESS NOTES
Pt sleeping in bed, calm with unlabored breathing. Nasal canula in place. Bed alarm on, bed locked and low, call light in reach.

## 2019-05-26 ENCOUNTER — HOME CARE VISIT (OUTPATIENT)
Dept: HOSPICE | Facility: HOSPICE | Age: 84
End: 2019-05-26
Payer: MEDICARE

## 2019-05-26 ENCOUNTER — HOSPICE ADMISSION (OUTPATIENT)
Dept: HOSPICE | Facility: HOSPICE | Age: 84
End: 2019-05-26
Payer: MEDICARE

## 2019-05-26 LAB
ALBUMIN SERPL BCP-MCNC: 2.5 G/DL (ref 3.2–4.9)
BUN SERPL-MCNC: 26 MG/DL (ref 8–22)
CALCIUM SERPL-MCNC: 7.8 MG/DL (ref 8.4–10.2)
CHLORIDE SERPL-SCNC: 93 MMOL/L (ref 96–112)
CO2 SERPL-SCNC: 29 MMOL/L (ref 20–33)
CREAT SERPL-MCNC: 0.93 MG/DL (ref 0.5–1.4)
GLUCOSE SERPL-MCNC: 109 MG/DL (ref 65–99)
PHOSPHATE SERPL-MCNC: 3.2 MG/DL (ref 2.5–4.5)
POTASSIUM SERPL-SCNC: 3.9 MMOL/L (ref 3.6–5.5)
SODIUM SERPL-SCNC: 131 MMOL/L (ref 135–145)

## 2019-05-26 PROCEDURE — 770006 HCHG ROOM/CARE - MED/SURG/GYN SEMI*

## 2019-05-26 PROCEDURE — A9270 NON-COVERED ITEM OR SERVICE: HCPCS | Performed by: INTERNAL MEDICINE

## 2019-05-26 PROCEDURE — 700111 HCHG RX REV CODE 636 W/ 250 OVERRIDE (IP): Performed by: INTERNAL MEDICINE

## 2019-05-26 PROCEDURE — 700102 HCHG RX REV CODE 250 W/ 637 OVERRIDE(OP): Performed by: INTERNAL MEDICINE

## 2019-05-26 PROCEDURE — 94760 N-INVAS EAR/PLS OXIMETRY 1: CPT

## 2019-05-26 PROCEDURE — 99232 SBSQ HOSP IP/OBS MODERATE 35: CPT | Performed by: INTERNAL MEDICINE

## 2019-05-26 PROCEDURE — 80069 RENAL FUNCTION PANEL: CPT

## 2019-05-26 RX ORDER — POTASSIUM CHLORIDE 20 MEQ/1
20 TABLET, EXTENDED RELEASE ORAL DAILY
Status: DISCONTINUED | OUTPATIENT
Start: 2019-05-26 | End: 2019-05-28 | Stop reason: HOSPADM

## 2019-05-26 RX ADMIN — PREDNISONE 10 MG: 20 TABLET ORAL at 06:32

## 2019-05-26 RX ADMIN — CARVEDILOL 6.25 MG: 6.25 TABLET, FILM COATED ORAL at 17:30

## 2019-05-26 RX ADMIN — CARVEDILOL 6.25 MG: 6.25 TABLET, FILM COATED ORAL at 08:22

## 2019-05-26 RX ADMIN — POTASSIUM CHLORIDE 20 MEQ: 1500 TABLET, EXTENDED RELEASE ORAL at 09:55

## 2019-05-26 RX ADMIN — BUDESONIDE AND FORMOTEROL FUMARATE DIHYDRATE 2 PUFF: 160; 4.5 AEROSOL RESPIRATORY (INHALATION) at 17:31

## 2019-05-26 RX ADMIN — BUDESONIDE AND FORMOTEROL FUMARATE DIHYDRATE 2 PUFF: 160; 4.5 AEROSOL RESPIRATORY (INHALATION) at 06:33

## 2019-05-26 RX ADMIN — ENALAPRIL MALEATE 10 MG: 5 TABLET ORAL at 06:33

## 2019-05-26 RX ADMIN — ENOXAPARIN SODIUM 30 MG: 100 INJECTION SUBCUTANEOUS at 06:39

## 2019-05-26 RX ADMIN — FUROSEMIDE 20 MG: 20 TABLET ORAL at 06:32

## 2019-05-26 RX ADMIN — ASPIRIN 81 MG: 81 TABLET, COATED ORAL at 06:32

## 2019-05-26 RX ADMIN — ATORVASTATIN CALCIUM 40 MG: 40 TABLET, FILM COATED ORAL at 17:30

## 2019-05-26 RX ADMIN — ENALAPRIL MALEATE 10 MG: 5 TABLET ORAL at 17:30

## 2019-05-26 RX ADMIN — SENNOSIDES, DOCUSATE SODIUM 2 TABLET: 50; 8.6 TABLET, FILM COATED ORAL at 17:30

## 2019-05-26 ASSESSMENT — ENCOUNTER SYMPTOMS
BACK PAIN: 0
PHOTOPHOBIA: 0
FEVER: 0
PALPITATIONS: 0
FOCAL WEAKNESS: 0
ORTHOPNEA: 0
SORE THROAT: 0
DIZZINESS: 0
WEAKNESS: 1
HEADACHES: 0
CHILLS: 0
VOMITING: 0
SHORTNESS OF BREATH: 1
COUGH: 0
DIARRHEA: 0
NAUSEA: 0
HALLUCINATIONS: 0
DOUBLE VISION: 0
DEPRESSION: 0
SHORTNESS OF BREATH: 1
MEMORY LOSS: 1

## 2019-05-26 ASSESSMENT — ACTIVITIES OF DAILY LIVING (ADL)
BATHING_REQUIRES_ASSISTANCE: 1
DRESSING_REQUIRES_ASSISTANCE: 1
AMBULATION_REQUIRES_ASSISTANCE: 1
CONTINENCE_REQUIRES_ASSISTANCE: 1

## 2019-05-26 ASSESSMENT — COPD QUESTIONNAIRES: COPD: 1

## 2019-05-26 NOTE — DISCHARGE PLANNING
Received Choice form at [phone]  Agency/Facility Name: [facility]  Referral sent per Choice form @ [phone]

## 2019-05-26 NOTE — FLOWSHEET NOTE
05/26/19 1107   Home O2 Setup Requirements (Complete within 24 hours of anticipated discharge)   Room air sat at rest 84   O2 sat at rest with O2 96   With liters of O2 2

## 2019-05-26 NOTE — FLOWSHEET NOTE
05/26/19 1410   Interdisciplinary Plan of Care-Goals (Indications)   Bronchodilator Indications History / Diagnosis   Therapy Not Performed   Type of Therapy Not Performed SVN   Reason Therapy Not Performed PRN, No Indication  (patient declined)   Chest Exam   Respiration 20   Pulse 73   Breath Sounds   RUL Breath Sounds Clear   RML Breath Sounds Diminished   RLL Breath Sounds Diminished   CARROLL Breath Sounds Clear   LLL Breath Sounds Diminished   Oxygen   Pulse Oximetry 94 %   O2 (LPM) 2   O2 Daily Delivery Respiratory  Silicone Nasal Cannula

## 2019-05-26 NOTE — FLOWSHEET NOTE
05/25/19 1451   Interdisciplinary Plan of Care-Goals (Indications)   Bronchodilator Indications History / Diagnosis   SVN Group   #SVN Performed Yes   Given By: Mask   Chest Exam   Respiration 20   Pulse 91   Breath Sounds   RUL Breath Sounds Clear   RML Breath Sounds Diminished   RLL Breath Sounds Diminished   CARROLL Breath Sounds Clear   LLL Breath Sounds Diminished   Oxygen   Pulse Oximetry 95 %   O2 (LPM) 2

## 2019-05-26 NOTE — FLOWSHEET NOTE
05/25/19 1943   Events/Summary/Plan   Non-Invasive Resp Device Site Inspection Completed Intact   Interdisciplinary Plan of Care-Goals (Indications)   Bronchodilator Indications History / Diagnosis   Interdisciplinary Plan of Care-Outcomes    Bronchodilator Outcome Patient at Stable Baseline   Education   Education Yes - Pt. / Family has been Instructed in use of Respiratory Equipment;Yes - Pt. / Family has been Instructed in use of Respiratory Medications and Adverse Reactions   RT Assessment of Delivered Medications   Evaluation of Medication Delivery Daily Yes-- Pt /Family has been Instructed in use of Respiratory Medications and Adverse Reactions   SVN Group   #SVN Performed Yes   Given By: Mask   MDI/DPI Group   #MDI/DPI Given MDI/DPI x 1   Respiratory WDL   Respiratory (WDL) WDL   Chest Exam   Work Of Breathing / Effort Mild   Respiration 18   Pulse 78   Breath Sounds   Pre/Post Intervention Pre Intervention Assessment   RUL Breath Sounds Clear   RML Breath Sounds Clear   RLL Breath Sounds Diminished   CARROLL Breath Sounds Clear   LLL Breath Sounds Diminished   Oximetry   #Pulse Oximetry (Single Determination) Yes   Oxygen   Pulse Oximetry 95 %   O2 (LPM) 2   O2 Daily Delivery Respiratory  Silicone Nasal Cannula

## 2019-05-26 NOTE — PROGRESS NOTES
Hospital Medicine Daily Progress Note    Date of Service  5/26/2019    Chief Complaint  89 y.o. male admitted 5/22/2019 with CP and SOB    Hospital Course    Hx of coronary artery disease, chronic systolic heart failure, chronic 2 L oxygen dependence due to COPD, physical debility, admitted for shortness of breath and chest tightness.  He was found to have fluid overload as he was not taking Lasix at home.      Interval Problem Update  5/23: Stress test with small area of partial reversibility, medical management.  Tolerating Lasix, improved respiratory status  5/24: Weaned to baseline 2 L, pending placement in SNF versus possibly hospice  5/25: Tolerating lasix, boost added for low albumin.  Pending SNF.  Breathing unchanged.  5/26: Comfortable, family chose hospice and their evaluation and acceptance is pending.    Consultants/Specialty  None    Code Status  DNR/DNI     Disposition  Follow-up SNF    Review of Systems  Review of Systems   Constitutional: Positive for malaise/fatigue. Negative for chills and fever.   HENT: Positive for hearing loss. Negative for sore throat.    Eyes: Negative for double vision and photophobia.   Respiratory: Positive for shortness of breath (Unchanged). Negative for cough.    Cardiovascular: Negative for palpitations, orthopnea and leg swelling.   Gastrointestinal: Negative for diarrhea, nausea and vomiting.   Genitourinary: Negative for frequency and hematuria.   Musculoskeletal: Negative for back pain and joint pain.   Neurological: Positive for weakness. Negative for dizziness, focal weakness and headaches.   Psychiatric/Behavioral: Positive for memory loss. Negative for depression and hallucinations.   All other systems reviewed and are negative.       Physical Exam  Temp:  [36.6 °C (97.8 °F)-37.2 °C (99 °F)] 36.8 °C (98.2 °F)  Pulse:  [72-91] 72  Resp:  [18-20] 18  BP: (116-147)/(57-78) 127/78  SpO2:  [84 %-100 %] 84 %    Physical Exam   Constitutional: He is oriented to  person, place, and time. He appears well-developed and well-nourished. No distress.   Thin, elderly   HENT:   Head: Normocephalic.   Mouth/Throat: No oropharyngeal exudate.   Eyes: Pupils are equal, round, and reactive to light. Conjunctivae are normal.   Neck: Normal range of motion. No tracheal deviation present.   Cardiovascular: Normal rate and regular rhythm.    Murmur heard.  Pulmonary/Chest: Effort normal. He has no rales. He exhibits no tenderness.   Diminished breath sounds   Abdominal: Soft. There is no rebound and no guarding.   Musculoskeletal: Normal range of motion. He exhibits no edema or tenderness.   Lymphadenopathy:     He has no cervical adenopathy.   Neurological: He is alert and oriented to person, place, and time. No cranial nerve deficit.   Skin: Skin is warm and dry. No rash noted.   Psychiatric: He has a normal mood and affect. His behavior is normal.   Nursing note and vitals reviewed.      Fluids    Intake/Output Summary (Last 24 hours) at 05/26/19 1338  Last data filed at 05/26/19 1200   Gross per 24 hour   Intake              610 ml   Output              675 ml   Net              -65 ml       Laboratory  Recent Labs      05/24/19   0648  05/25/19   0549   WBC  10.1  9.8   RBC  3.72*  3.66*   HEMOGLOBIN  11.8*  11.5*   HEMATOCRIT  36.2*  35.6*   MCV  97.3  97.3   MCH  31.7  31.4   MCHC  32.6*  32.3*   RDW  47.3  47.4   PLATELETCT  214  187   MPV  8.6*  8.6*     Recent Labs      05/24/19   0648  05/25/19   0549  05/26/19   0318   SODIUM  134*  133*  131*   POTASSIUM  3.4*  4.1  3.9   CHLORIDE  93*  96  93*   CO2  30  31  29   GLUCOSE  114*  98  109*   BUN  23*  25*  26*   CREATININE  1.00  0.84  0.93   CALCIUM  8.0*  7.8*  7.8*                   Imaging  NM-CARDIAC STRESS TEST   Final Result      EC-ECHOCARDIOGRAM COMPLETE W/O CONT   Final Result      CT-CTA CHEST PULMONARY ARTERY W/ RECONS   Final Result      1.  There is no CT evidence of acute pulmonary embolism.   2.  Mildly enlarged  heart with new small dependent pleural effusions and dependent groundglass and reticular opacities in both lungs most consistent with changes of pulmonary edema.   3.  There are patchy opacities in the left lower lobe which could represent areas of atelectasis or pneumonia.   4.  There is underlying emphysema.   5.  There is atherosclerosis with coronary artery calcification.            DX-CHEST-PORTABLE (1 VIEW)   Final Result      1.  Worsening interstitial infiltrates bilaterally. Differential diagnosis includes pulmonary edema, atypical infection and progression of interstitial lung disease.      2.  Cardiomegaly.      3.  Somewhat more prominent consolidation within the left lung base possibly representing pneumonia.           Assessment/Plan  Acute on chronic respiratory failure with hypoxia (HCC)- (present on admission)   Assessment & Plan    He wears 2 L at baseline, was requiring 3-4 on admission has improved with Lasix  Continue Lasix 20 p.o. daily to avoid overdiuresis     Chronic obstructive pulmonary disease with acute exacerbation (HCC)- (present on admission)   Assessment & Plan    He has markedly diminished breath sounds unclear what his baseline is RT protocol has been ordered  There is no overt wheezing, continue home prednisone  Duo nebs  Symbicort  He wears 2 L of oxygen at home, continue     Severe protein-calorie malnutrition (HCC)   Assessment & Plan    BMI is less than 19  Add boost TID     Coronary artery disease   Assessment & Plan    He has had a cardiac cath in 2014 but no stents were placed.  Since then he has been followed by cardiology and is medically managed.  On his stress test he had a small area of reversible ischemia however troponins were normal.  I ran this by cardiology and due to his age, medical management will be recommended  Add atorvastatin  Add Coreg 6.25 twice daily  Continue Lasix 20  Continue enalapril 10  Aspirin     Multiple rib fractures   Assessment & Plan     Present since March 2019 following fall  Patient has been declining since discharge from [facility] care [facility] weeks ago  PT eval     Acute on chronic respiratory failure with hypoxemia (HCC)   Assessment & Plan    Due to fluid overload, baseline 2L from COPD  D-dimer was elevated  CTA showed no clot however but multiple rib fractures which were present in March 2019.  PO lasix     D-dimer, elevated   Assessment & Plan    No PE     Acute exacerbation of CHF (congestive heart failure) (HCC)- (present on admission)   Assessment & Plan    Elevated BNP and crackles on admission, he was not previously taking Lasix despite known EF of 45%.  I reviewed the repeat echo and it shows EF of 55%, suspect element of pulmonary hypertension  Continue Lasix 20 p.o. daily  Low-salt diet  Continue Coreg, Vasotec     Abnormal stress test- (present on admission)   Assessment & Plan    Has been noted in the past as well, medical management  He has elected to DC home w hospice, referral placed          VTE prophylaxis: Lovenox

## 2019-05-26 NOTE — CARE PLAN
Problem: Safety  Goal: Will remain free from injury  Outcome: PROGRESSING AS EXPECTED  Pt up with walker and one person assist. Non-slip socks on. Call light and personal belongings within reach. Bed in lowest position with wheels locked. Bed alarm on.       Problem: Fluid Volume:  Goal: Will maintain balanced intake and output  Outcome: PROGRESSING AS EXPECTED  Pt on 1000 mL fluid restriction. Intake and output being recorded. VSS.

## 2019-05-26 NOTE — CARE PLAN
Problem: Communication  Goal: The ability to communicate needs accurately and effectively will improve  Outcome: PROGRESSING AS EXPECTED  Discussed plan of care with patient and reminded patient about fluid restriction.  Answered all questions at this time.  Reminded patient to use the call light for assistance.    Problem: Safety  Goal: Will remain free from falls  Outcome: PROGRESSING AS EXPECTED  Bed alarm and non-skid socks on.  Reminded patient to use the call light for assistance.  Call light and belongings within reach.

## 2019-05-27 LAB
BACTERIA BLD CULT: NORMAL
BACTERIA BLD CULT: NORMAL
SIGNIFICANT IND 70042: NORMAL
SIGNIFICANT IND 70042: NORMAL
SITE SITE: NORMAL
SITE SITE: NORMAL
SOURCE SOURCE: NORMAL
SOURCE SOURCE: NORMAL

## 2019-05-27 PROCEDURE — 700102 HCHG RX REV CODE 250 W/ 637 OVERRIDE(OP): Performed by: INTERNAL MEDICINE

## 2019-05-27 PROCEDURE — 770006 HCHG ROOM/CARE - MED/SURG/GYN SEMI*

## 2019-05-27 PROCEDURE — A9270 NON-COVERED ITEM OR SERVICE: HCPCS | Performed by: INTERNAL MEDICINE

## 2019-05-27 PROCEDURE — 99232 SBSQ HOSP IP/OBS MODERATE 35: CPT | Performed by: INTERNAL MEDICINE

## 2019-05-27 PROCEDURE — 700111 HCHG RX REV CODE 636 W/ 250 OVERRIDE (IP): Performed by: INTERNAL MEDICINE

## 2019-05-27 PROCEDURE — 94760 N-INVAS EAR/PLS OXIMETRY 1: CPT

## 2019-05-27 RX ORDER — ATORVASTATIN CALCIUM 40 MG/1
40 TABLET, FILM COATED ORAL EVERY EVENING
Qty: 30 TAB
Start: 2019-05-27 | End: 2019-05-29

## 2019-05-27 RX ORDER — FUROSEMIDE 20 MG/1
20 TABLET ORAL DAILY
Qty: 60 TAB | Refills: 1 | Status: SHIPPED | OUTPATIENT
Start: 2019-05-28 | End: 2019-08-01

## 2019-05-27 RX ORDER — POTASSIUM CHLORIDE 20 MEQ/1
20 TABLET, EXTENDED RELEASE ORAL DAILY
Qty: 60 TAB | Refills: 11 | Status: SHIPPED | OUTPATIENT
Start: 2019-05-28 | End: 2019-10-02

## 2019-05-27 RX ADMIN — BUDESONIDE AND FORMOTEROL FUMARATE DIHYDRATE 2 PUFF: 160; 4.5 AEROSOL RESPIRATORY (INHALATION) at 05:33

## 2019-05-27 RX ADMIN — BUDESONIDE AND FORMOTEROL FUMARATE DIHYDRATE 2 PUFF: 160; 4.5 AEROSOL RESPIRATORY (INHALATION) at 17:05

## 2019-05-27 RX ADMIN — POTASSIUM CHLORIDE 20 MEQ: 1500 TABLET, EXTENDED RELEASE ORAL at 05:30

## 2019-05-27 RX ADMIN — ENALAPRIL MALEATE 10 MG: 5 TABLET ORAL at 05:31

## 2019-05-27 RX ADMIN — ASPIRIN 81 MG: 81 TABLET, COATED ORAL at 05:31

## 2019-05-27 RX ADMIN — FUROSEMIDE 20 MG: 20 TABLET ORAL at 05:31

## 2019-05-27 RX ADMIN — ENALAPRIL MALEATE 10 MG: 5 TABLET ORAL at 17:05

## 2019-05-27 RX ADMIN — CARVEDILOL 6.25 MG: 6.25 TABLET, FILM COATED ORAL at 17:05

## 2019-05-27 RX ADMIN — PREDNISONE 10 MG: 20 TABLET ORAL at 05:31

## 2019-05-27 RX ADMIN — ATORVASTATIN CALCIUM 40 MG: 40 TABLET, FILM COATED ORAL at 17:05

## 2019-05-27 RX ADMIN — CARVEDILOL 6.25 MG: 6.25 TABLET, FILM COATED ORAL at 08:19

## 2019-05-27 ASSESSMENT — ENCOUNTER SYMPTOMS
FOCAL WEAKNESS: 0
DIZZINESS: 0
COUGH: 0
SORE THROAT: 0
HALLUCINATIONS: 0
CLAUDICATION: 0
WEAKNESS: 1
NECK PAIN: 0
DOUBLE VISION: 0
DIARRHEA: 0
BACK PAIN: 0
MEMORY LOSS: 0
DEPRESSION: 0
ORTHOPNEA: 0
SHORTNESS OF BREATH: 1
HEADACHES: 0
PHOTOPHOBIA: 0
CONSTIPATION: 0
FEVER: 0
CHILLS: 0

## 2019-05-27 NOTE — HOSPICE
ATTN: PROVIDER/CARE MANAGEMENT TEAM/NURSES     RE: Referral for [facility]     As of 5/26/19, we have accepted the referral to [facility] for the patient listed above.     To access the [facility] Notes you can click on Chart Review and then click onto Encounters (left top corner of screen) and see our documentation.      If you have any questions or concerns regarding the patient’s transition to [facility], please do not hesitate to contact us at [phone]     We look forward to collaborating with you,  [facility] Care Team

## 2019-05-27 NOTE — DISCHARGE PLANNING
Pt has been accepted by [facility]. Plan is for pt to d/c home with son. CM team will assist with transportation if needed.     Care Transition Team Assessment    Information Source  Orientation : Oriented x 4  Information Given By: Patient  Informant's Name: Jhon  Who is responsible for making decisions for patient? : Patient    Readmission Evaluation  Is this a readmission?: No    Elopement Risk  Legal Hold: No  Ambulatory or Self Mobile in Wheelchair: Yes  Disoriented: No  Psychiatric Symptoms: None  History of Wandering: No  Elopement this Admit: No  Vocalizing Wanting to Leave: No  Displays Behaviors, Body Language Wanting to Leave: No-Not at Risk for Elopement  Elopement Risk: Not at Risk for Elopement    Interdisciplinary Discharge Planning  Does Admitting Nurse Feel This Could be a Complex Discharge?: No  Primary Care Physician: Isaac  Lives with - Patient's Self Care Capacity: Adult Children  Patient or legal guardian wants to designate a caregiver (see row info): No  Support Systems: Family Member(s), Friends / Neighbors, Children  Housing / Facility: [address]  Do You Take your Prescribed Medications Regularly: Yes  Able to Return to Previous ADL's: Yes  Mobility Issues: Yes  Prior Services: Intermittent Physical Support for ADL Per Family  Patient Expects to be Discharged to:: Home  Assistance Needed: Yes  Durable Medical Equipment: Home Oxygen, Walker    Discharge Preparedness  What is your plan after discharge?: Home with help, Other (comment) ([facility])  What are your discharge supports?: Child  Prior Functional Level: Needs Assist with Activities of Daily Living    Functional Assesment  Prior Functional Level: Needs Assist with Activities of Daily Living    Finances  Financial Barriers to Discharge: No  Prescription Coverage: Yes    Vision / Hearing Impairment  Vision Impairment : No  Hearing Impairment : No  Hearing Impairment: Both Ears, Patient Declines to Wear Hearing  Device(s)         Advance Directive  Advance Directive?: None    Domestic Abuse  Have you ever been the victim of abuse or violence?: No  Physical Abuse or Sexual Abuse: No  Verbal Abuse or Emotional Abuse: No  Possible Abuse Reported to:: Not Applicable    Psychological Assessment  History of Substance Abuse: None  History of Psychiatric Problems: No  Non-compliant with Treatment: No    Discharge Risks or Barriers  Discharge risks or barriers?: No  Patient risk factors: Readmission, Vulnerable adult    Anticipated Discharge Information  Anticipated discharge disposition: Home, Hospice  Discharge Address:  ([address] [place] [address] [place] [address])  Discharge Contact Phone Number:  ([phone], [phone])

## 2019-05-27 NOTE — CARE PLAN
Problem: Safety  Goal: Will remain free from injury  Outcome: PROGRESSING AS EXPECTED  Pt up with walker and one person assist. Non-slip socks on. Call light and personal belongings within reach. Bed in lowest position with wheels locked. Bed alarm on.     Problem: Fluid Volume:  Goal: Will maintain balanced intake and output  Outcome: PROGRESSING AS EXPECTED  Pt on 1000 mL fluid restriction. Intake and output being recorded. VSS.     Problem: Psychosocial Needs:  Goal: Level of anxiety will decrease  Outcome: PROGRESSING AS EXPECTED  Preparing for discharge with home hospice tomorrow. Social work following. Family at bedside. Emotional support provided.

## 2019-05-27 NOTE — DISCHARGE SUMMARY
Discharge Summary    CHIEF COMPLAINT ON ADMISSION  Chief Complaint   Patient presents with   • Shortness of Breath       Reason for Admission  Sent by MD     Admission Date  5/22/2019    CODE STATUS  DNAR/DNI    HPI & HOSPITAL COURSE  This is a 89 y.o. Male with a Hx of coronary artery disease, chronic systolic heart failure, chronic 2 L oxygen dependence due to COPD, physical debility, admitted for shortness of breath and chest tightness.  He was found to have fluid overload as he was not taking Lasix at home.  He was started on IV Lasix and had improvement of his breathing.  His oxygen was weaned from 3 to 4 L down to his baseline of 2 L and therefore he was transitioned to oral Lasix.    He did complain of some chest discomfort.  Review of previous records indicated he has been admitted for this may times in the past.  He does follow with outpatient cardiology.  A stress test was performed as his troponins were negative and it did show a very small area of partial reversibility.  This was discussed with the patient and his family and due to his advanced age, no cath was planned.  A statin medication was added to his regimen however in order to reduce any further risk of heart attack in the future.    Multiple discussions were held with the patient's family and it was determined that his needs are just too high for them at home.  Due to his advanced age and DNR/DNI status, they decided to transition the patient over to hospice.  He was accepted under hospice care and will be discharged in order to establish care with hospice as an outpatient.  For now, he will be continued on his regular medications and any further adjustments will be made by hospice as an outpatient.    Therefore, he is discharged in fair and stable condition to hospice.    The patient met 2-midnight criteria for an inpatient stay at the time of discharge.    Discharge Date  5/28/2019    FOLLOW UP ITEMS POST DISCHARGE  Follow-up with cardiology  as needed for long-standing coronary artery disease  Follow-up with PCP as needed  Follow-up with hospice outpatient    DISCHARGE DIAGNOSES  Active Problems:    Chronic obstructive pulmonary disease with acute exacerbation (HCC) POA: Yes    Acute on chronic respiratory failure with hypoxia (HCC) POA: Yes    Abnormal stress test POA: Yes    Acute exacerbation of CHF (congestive heart failure) (HCC) POA: Yes    D-dimer, elevated POA: Unknown    Acute on chronic respiratory failure with hypoxemia (HCC) POA: Unknown    Multiple rib fractures POA: Unknown    Coronary artery disease POA: Unknown    Severe protein-calorie malnutrition (HCC) POA: Unknown  Resolved Problems:    * No resolved hospital problems. *      FOLLOW UP  No future appointments.  No follow-up provider specified.    MEDICATIONS ON DISCHARGE     Medication List      START taking these medications      Instructions   atorvastatin 40 MG Tabs  Commonly known as:  LIPITOR   Take 1 Tab by mouth every evening.  Dose:  40 mg     furosemide 20 MG Tabs  Start taking on:  5/28/2019  Commonly known as:  LASIX   Take 1 Tab by mouth every day.  Dose:  20 mg     potassium chloride SA 20 MEQ Tbcr  Start taking on:  5/28/2019  Commonly known as:  Kdur   Take 1 Tab by mouth every day.  Dose:  20 mEq        CONTINUE taking these medications      Instructions   alendronate 10 MG Tabs  Commonly known as:  FOSAMAX   Take 10 mg by mouth every morning.  Dose:  10 mg     aspirin 81 MG tablet   Take 81 mg by mouth every day.  Dose:  81 mg     BREO ELLIPTA 100-25 MCG/INH Aepb  Generic drug:  Fluticasone Furoate-Vilanterol   Inhale 1 Puff by mouth every day.  Dose:  1 Puff     carvedilol 6.25 MG Tabs  Commonly known as:  COREG   Take 6.25 mg by mouth 2 Times a Day.  Dose:  6.25 mg     D3 HIGH POTENCY 1000 UNIT Caps  Generic drug:  Cholecalciferol   Take 1,000 Units by mouth every day at 6 PM.  Dose:  1000 Units     enalapril 10 MG Tabs  Commonly known as:  VASOTEC   Take 10 mg by  mouth 2 Times a Day.  Dose:  10 mg     predniSONE 10 MG Tabs  Commonly known as:  DELTASONE   Take 10 mg by mouth every day.  Dose:  10 mg     SPIRIVA HANDIHALER 18 MCG Caps  Generic drug:  tiotropium   Inhale 18 mcg by mouth every day.  Dose:  18 mcg            Allergies  No Known Allergies    DIET  Orders Placed This Encounter   Procedures   • Diet Order 2 Gram Sodium     Standing Status:   Standing     Number of Occurrences:   1     Order Specific Question:   Diet:     Answer:   2 Gram Sodium [7]     Order Specific Question:   Consistency/Fluid modifications:     Answer:   1000 ml Fluid Restriction [7]       ACTIVITY  As tolerated.  Weight bearing as tolerated    CONSULTATIONS  None    PROCEDURES  None    LABORATORY  Lab Results   Component Value Date    SODIUM 131 (L) 05/26/2019    POTASSIUM 3.9 05/26/2019    CHLORIDE 93 (L) 05/26/2019    CO2 29 05/26/2019    GLUCOSE 109 (H) 05/26/2019    BUN 26 (H) 05/26/2019    CREATININE 0.93 05/26/2019    CREATININE 1.2 03/25/2008        Lab Results   Component Value Date    WBC 9.8 05/25/2019    HEMOGLOBIN 11.5 (L) 05/25/2019    HEMATOCRIT 35.6 (L) 05/25/2019    PLATELETCT 187 05/25/2019        Total time of the discharge process exceeds 40 minutes.

## 2019-05-27 NOTE — PROGRESS NOTES
Report received from Dania MCGILL. Plan of care discussed. Safety precautions in place. Family at bedside.

## 2019-05-27 NOTE — DISCHARGE PLANNING
KATEYW spoke with Nikki Tan with [facility]. Per Nikki, pt has been accepted by [facility]. Plan is for pt to d/c tomorrow, unsure if d/c via [facility] or family.

## 2019-05-27 NOTE — PROGRESS NOTES
Pt incontinent of urine. Pt's clothes and bed linens changed. Cinthia care provided. Pt positioned comfortably in bed. Bed alarm on. Family now at bedside.

## 2019-05-27 NOTE — FLOWSHEET NOTE
05/27/19 1450   Interdisciplinary Plan of Care-Goals (Indications)   Bronchodilator Indications History / Diagnosis   Therapy Not Performed   Type of Therapy Not Performed SVN   Reason Therapy Not Performed PRN, No Indication   Chest Exam   Respiration 18   Pulse 72   Breath Sounds   RUL Breath Sounds Clear   RML Breath Sounds Diminished   RLL Breath Sounds Diminished   CARROLL Breath Sounds Clear   LLL Breath Sounds Diminished   Oximetry   #Pulse Oximetry (Single Determination) Yes   Oxygen   Home O2 Use Prior To Admission? Yes   Home O2 LPM Flow 2 LPM   Home O2 Delivery Method Nasal Cannula   Home O2 Frequency of Use As Needed for SOB   Pulse Oximetry 93 %  (sleeping)   O2 (LPM) 2   O2 Daily Delivery Respiratory  Silicone Nasal Cannula

## 2019-05-27 NOTE — CARE PLAN
Problem: Nutritional:  Goal: Achieve adequate nutritional intake  Patient will consume >50% of meals consistently   Outcome: PROGRESSING AS EXPECTED  Pt visited in room this morning. He reports that his appetite continues to be poor. He is amenable to trying different supplement options to see which he likes better. His PO intake has been variable since admit, but on average is likely ~50% or greater based on records. RD monitoring.

## 2019-05-27 NOTE — PROGRESS NOTES
Hospital Medicine Daily Progress Note    Date of Service  5/27/2019    Chief Complaint  89 y.o. male admitted 5/22/2019 with CP and SOB    Hospital Course    Hx of coronary artery disease, chronic systolic heart failure, chronic 2 L oxygen dependence due to COPD, physical debility, admitted for shortness of breath and chest tightness.  He was found to have fluid overload as he was not taking Lasix at home.      Interval Problem Update  5/23: Stress test with small area of partial reversibility, medical management.  Tolerating Lasix, improved respiratory status  5/24: Weaned to baseline 2 L, pending placement in SNF versus possibly hospice  5/25: Tolerating lasix, boost added for low albumin.  Pending SNF.  Breathing unchanged.  5/26: Comfortable, family chose hospice and their evaluation and acceptance is pending.  5/27: Comfortable, breathing unchanged    Consultants/Specialty  None    Code Status  DNR/DNI     Disposition  He will be discharged with hospice    Review of Systems  Review of Systems   Constitutional: Positive for malaise/fatigue. Negative for chills and fever.   HENT: Positive for hearing loss. Negative for sore throat.    Eyes: Negative for double vision and photophobia.   Respiratory: Positive for shortness of breath (Exertion, comfortable at rest). Negative for cough.    Cardiovascular: Negative for chest pain, orthopnea, claudication and leg swelling.   Gastrointestinal: Negative for constipation and diarrhea.   Genitourinary: Negative for frequency and urgency.   Musculoskeletal: Negative for back pain and neck pain.   Neurological: Positive for weakness. Negative for dizziness, focal weakness and headaches.   Psychiatric/Behavioral: Negative for depression, hallucinations and memory loss.   All other systems reviewed and are negative.       Physical Exam  Temp:  [36.6 °C (97.8 °F)-36.7 °C (98 °F)] 36.6 °C (97.9 °F)  Pulse:  [68-83] 70  Resp:  [18-20] 18  BP: (106-136)/(50-67) 136/60  SpO2:  [90  %-99 %] 99 %    Physical Exam   Constitutional: He is oriented to person, place, and time. He appears well-developed and well-nourished. No distress.   Thin, elderly, comfortable   HENT:   Head: Normocephalic.   Mouth/Throat: No oropharyngeal exudate.   Eyes: Pupils are equal, round, and reactive to light. Conjunctivae are normal.   Neck: Normal range of motion. No tracheal deviation present.   Cardiovascular: Normal rate and regular rhythm.    Murmur heard.  Pulmonary/Chest: Effort normal. He has no rales. He exhibits no tenderness.   Diminished breath sounds   Abdominal: Soft. There is no rebound and no guarding.   Musculoskeletal: Normal range of motion. He exhibits no edema or tenderness.   Lymphadenopathy:     He has no cervical adenopathy.   Neurological: He is alert and oriented to person, place, and time. No cranial nerve deficit.   Skin: Skin is warm and dry. No rash noted.   Psychiatric: He has a normal mood and affect. His behavior is normal.   Nursing note and vitals reviewed.      Fluids    Intake/Output Summary (Last 24 hours) at 05/27/19 1230  Last data filed at 05/27/19 1200   Gross per 24 hour   Intake              360 ml   Output             1150 ml   Net             -790 ml       Laboratory  Recent Labs      05/25/19   0549   WBC  9.8   RBC  3.66*   HEMOGLOBIN  11.5*   HEMATOCRIT  35.6*   MCV  97.3   MCH  31.4   MCHC  32.3*   RDW  47.4   PLATELETCT  187   MPV  8.6*     Recent Labs      05/25/19   0549  05/26/19   0318   SODIUM  133*  131*   POTASSIUM  4.1  3.9   CHLORIDE  96  93*   CO2  31  29   GLUCOSE  98  109*   BUN  25*  26*   CREATININE  0.84  0.93   CALCIUM  7.8*  7.8*                   Imaging  NM-CARDIAC STRESS TEST   Final Result      EC-ECHOCARDIOGRAM COMPLETE W/O CONT   Final Result      CT-CTA CHEST PULMONARY ARTERY W/ RECONS   Final Result      1.  There is no CT evidence of acute pulmonary embolism.   2.  Mildly enlarged heart with new small dependent pleural effusions and dependent  groundglass and reticular opacities in both lungs most consistent with changes of pulmonary edema.   3.  There are patchy opacities in the left lower lobe which could represent areas of atelectasis or pneumonia.   4.  There is underlying emphysema.   5.  There is atherosclerosis with coronary artery calcification.            DX-CHEST-PORTABLE (1 VIEW)   Final Result      1.  Worsening interstitial infiltrates bilaterally. Differential diagnosis includes pulmonary edema, atypical infection and progression of interstitial lung disease.      2.  Cardiomegaly.      3.  Somewhat more prominent consolidation within the left lung base possibly representing pneumonia.           Assessment/Plan  Acute on chronic respiratory failure with hypoxia (HCC)- (present on admission)   Assessment & Plan    He wears 2 L at baseline, was requiring 3-4 on admission has improved with Lasix  Continue Lasix 20 p.o. daily to avoid overdiuresis     Chronic obstructive pulmonary disease with acute exacerbation (HCC)- (present on admission)   Assessment & Plan    He has markedly diminished breath sounds unclear what his baseline is RT protocol has been ordered  There is no overt wheezing, continue home prednisone  Duo nebs  Symbicort  He wears 2 L of oxygen at home, continue     Severe protein-calorie malnutrition (HCC)   Assessment & Plan    BMI is less than 19  Add boost TID     Coronary artery disease   Assessment & Plan    He has had a cardiac cath in 2014 but no stents were placed.  Since then he has been followed by cardiology and is medically managed.  On his stress test he had a small area of reversible ischemia however troponins were normal.  I ran this by cardiology and due to his age, medical management will be recommended  Add atorvastatin  Add Coreg 6.25 twice daily  Continue Lasix 20  Continue enalapril 10  Aspirin     Multiple rib fractures   Assessment & Plan    Present since March 2019 following fall  Patient has been declining  since discharge from [facility] ago  PT eval     Acute on chronic respiratory failure with hypoxemia (HCC)   Assessment & Plan    Due to fluid overload, baseline 2L from COPD  D-dimer was elevated  CTA showed no clot however but multiple rib fractures which were present in March 2019.  PO lasix     D-dimer, elevated   Assessment & Plan    No PE     Acute exacerbation of CHF (congestive heart failure) (HCC)- (present on admission)   Assessment & Plan    Elevated BNP and crackles on admission, he was not previously taking Lasix despite known EF of 45%.  I reviewed the repeat echo and it shows EF of 55%, suspect element of pulmonary hypertension  Continue Lasix 20 p.o. daily  Low-salt diet  Continue Coreg, Vasotec     Abnormal stress test- (present on admission)   Assessment & Plan    Has been noted in the past as well, medical management  He has elected to DC home w hospice, referral placed          VTE prophylaxis: Lovenox

## 2019-05-27 NOTE — PROGRESS NOTES
Pt ambulated with walker and family members approximately 180 ft. Quickly became short of breath and tired out. Now resting in bed.

## 2019-05-27 NOTE — PROGRESS NOTES
Patient had bowel movement in the middle of the night, see flowsheet. Otherwise, patient slept through the night with no additional needs. Report given to Dania MCGILL. Plan of care discussed. Safety precautions in place.

## 2019-05-28 ENCOUNTER — PATIENT OUTREACH (OUTPATIENT)
Dept: HEALTH INFORMATION MANAGEMENT | Facility: OTHER | Age: 84
End: 2019-05-28

## 2019-05-28 ENCOUNTER — HOME CARE VISIT (OUTPATIENT)
Dept: HOSPICE | Facility: HOSPICE | Age: 84
End: 2019-05-28
Payer: MEDICARE

## 2019-05-28 VITALS
DIASTOLIC BLOOD PRESSURE: 55 MMHG | TEMPERATURE: 97.3 F | HEART RATE: 74 BPM | RESPIRATION RATE: 18 BRPM | OXYGEN SATURATION: 91 % | WEIGHT: 121.69 LBS | BODY MASS INDEX: 18.44 KG/M2 | HEIGHT: 68 IN | SYSTOLIC BLOOD PRESSURE: 107 MMHG

## 2019-05-28 VITALS — RESPIRATION RATE: 20 BRPM | SYSTOLIC BLOOD PRESSURE: 112 MMHG | HEART RATE: 78 BPM | DIASTOLIC BLOOD PRESSURE: 72 MMHG

## 2019-05-28 LAB
ALBUMIN SERPL BCP-MCNC: 2.8 G/DL (ref 3.2–4.9)
BASOPHILS # BLD AUTO: 0.2 % (ref 0–1.8)
BASOPHILS # BLD: 0.02 K/UL (ref 0–0.12)
BUN SERPL-MCNC: 28 MG/DL (ref 8–22)
CALCIUM SERPL-MCNC: 7.9 MG/DL (ref 8.4–10.2)
CHLORIDE SERPL-SCNC: 93 MMOL/L (ref 96–112)
CO2 SERPL-SCNC: 32 MMOL/L (ref 20–33)
CREAT SERPL-MCNC: 0.86 MG/DL (ref 0.5–1.4)
EOSINOPHIL # BLD AUTO: 0.35 K/UL (ref 0–0.51)
EOSINOPHIL NFR BLD: 4.2 % (ref 0–6.9)
ERYTHROCYTE [DISTWIDTH] IN BLOOD BY AUTOMATED COUNT: 46.7 FL (ref 35.9–50)
GLUCOSE SERPL-MCNC: 112 MG/DL (ref 65–99)
HCT VFR BLD AUTO: 35.8 % (ref 42–52)
HGB BLD-MCNC: 11.5 G/DL (ref 14–18)
IMM GRANULOCYTES # BLD AUTO: 0.21 K/UL (ref 0–0.11)
IMM GRANULOCYTES NFR BLD AUTO: 2.5 % (ref 0–0.9)
LYMPHOCYTES # BLD AUTO: 0.74 K/UL (ref 1–4.8)
LYMPHOCYTES NFR BLD: 8.9 % (ref 22–41)
MCH RBC QN AUTO: 31.2 PG (ref 27–33)
MCHC RBC AUTO-ENTMCNC: 32.1 G/DL (ref 33.7–35.3)
MCV RBC AUTO: 97 FL (ref 81.4–97.8)
MONOCYTES # BLD AUTO: 0.85 K/UL (ref 0–0.85)
MONOCYTES NFR BLD AUTO: 10.2 % (ref 0–13.4)
NEUTROPHILS # BLD AUTO: 6.16 K/UL (ref 1.82–7.42)
NEUTROPHILS NFR BLD: 74 % (ref 44–72)
NRBC # BLD AUTO: 0 K/UL
NRBC BLD-RTO: 0 /100 WBC
PHOSPHATE SERPL-MCNC: 2.7 MG/DL (ref 2.5–4.5)
PLATELET # BLD AUTO: 142 K/UL (ref 164–446)
PMV BLD AUTO: 8.7 FL (ref 9–12.9)
POTASSIUM SERPL-SCNC: 4.2 MMOL/L (ref 3.6–5.5)
RBC # BLD AUTO: 3.69 M/UL (ref 4.7–6.1)
SODIUM SERPL-SCNC: 129 MMOL/L (ref 135–145)
WBC # BLD AUTO: 8.3 K/UL (ref 4.8–10.8)

## 2019-05-28 PROCEDURE — G0299 HHS/HOSPICE OF RN EA 15 MIN: HCPCS

## 2019-05-28 PROCEDURE — 700102 HCHG RX REV CODE 250 W/ 637 OVERRIDE(OP): Performed by: INTERNAL MEDICINE

## 2019-05-28 PROCEDURE — 665036 HSPC NOTICE OF ELECTION NOE

## 2019-05-28 PROCEDURE — S9126 HOSPICE CARE, IN THE HOME, P: HCPCS

## 2019-05-28 PROCEDURE — 85025 COMPLETE CBC W/AUTO DIFF WBC: CPT

## 2019-05-28 PROCEDURE — 80069 RENAL FUNCTION PANEL: CPT

## 2019-05-28 PROCEDURE — A9270 NON-COVERED ITEM OR SERVICE: HCPCS | Performed by: INTERNAL MEDICINE

## 2019-05-28 PROCEDURE — 700111 HCHG RX REV CODE 636 W/ 250 OVERRIDE (IP): Performed by: INTERNAL MEDICINE

## 2019-05-28 PROCEDURE — 6650990 HC HOSPICE AND HOME CARE RX REV CODE 0250

## 2019-05-28 PROCEDURE — 99239 HOSP IP/OBS DSCHRG MGMT >30: CPT | Performed by: HOSPITALIST

## 2019-05-28 RX ADMIN — FUROSEMIDE 20 MG: 20 TABLET ORAL at 05:59

## 2019-05-28 RX ADMIN — ENALAPRIL MALEATE 10 MG: 5 TABLET ORAL at 05:49

## 2019-05-28 RX ADMIN — POTASSIUM CHLORIDE 20 MEQ: 1500 TABLET, EXTENDED RELEASE ORAL at 05:49

## 2019-05-28 RX ADMIN — ASPIRIN 81 MG: 81 TABLET, COATED ORAL at 05:49

## 2019-05-28 RX ADMIN — PREDNISONE 10 MG: 20 TABLET ORAL at 05:49

## 2019-05-28 RX ADMIN — SENNOSIDES, DOCUSATE SODIUM 2 TABLET: 50; 8.6 TABLET, FILM COATED ORAL at 05:49

## 2019-05-28 RX ADMIN — CARVEDILOL 6.25 MG: 6.25 TABLET, FILM COATED ORAL at 09:14

## 2019-05-28 RX ADMIN — ENOXAPARIN SODIUM 30 MG: 100 INJECTION SUBCUTANEOUS at 06:47

## 2019-05-28 RX ADMIN — BUDESONIDE AND FORMOTEROL FUMARATE DIHYDRATE 2 PUFF: 160; 4.5 AEROSOL RESPIRATORY (INHALATION) at 05:49

## 2019-05-28 SDOH — ECONOMIC STABILITY: HOUSING INSECURITY: EVIDENCE OF SMOKING MATERIAL: 0

## 2019-05-28 ASSESSMENT — ACTIVITIES OF DAILY LIVING (ADL)
BATHING_REQUIRES_ASSISTANCE: 1
DRESSING_REQUIRES_ASSISTANCE: 1
PHYSICAL_TRANSFER_REQUIRES_ASSISTANCE: 1
CONTINENCE_REQUIRES_ASSISTANCE: 1
MONEY MANAGEMENT (EXPENSES/BILLS): TOTALLY DEPENDENT

## 2019-05-28 ASSESSMENT — ENCOUNTER SYMPTOMS
DIARRHEA: 1
SLEEP QUALITY: ADEQUATE
LAST BOWEL MOVEMENT: 65161
FORGETFULNESS: 1
SHORTNESS OF BREATH: 1
INTRACTABLE COUGH: 1
DYSPNEA ACTIVITY LEVEL: AT REST
STOOL FREQUENCY: DAILY
FATIGUE: 1

## 2019-05-28 ASSESSMENT — COPD QUESTIONNAIRES: COPD: 1

## 2019-05-28 ASSESSMENT — PATIENT HEALTH QUESTIONNAIRE - PHQ9: CLINICAL INTERPRETATION OF PHQ2 SCORE: 0

## 2019-05-28 ASSESSMENT — SOCIAL DETERMINANTS OF HEALTH (SDOH): ACTIVE STRESSOR - HEALTH CHANGES: 1

## 2019-05-28 NOTE — DISCHARGE PLANNING
KATEYW spoke with son Moi who stated he will be able to  pt once pt is cleared to dc. KATEYW updated hospice team.

## 2019-05-28 NOTE — PROGRESS NOTES
Report received from NANO Jrodan. Patient resting comfortably in bed. Declines any needs at this time. Call light in reach. Bed alarm on.

## 2019-05-28 NOTE — PROGRESS NOTES
Pt is sleeping in bed, calm with unlabored breathing. Bed locked and low, bed alarm on call light in reach.

## 2019-05-28 NOTE — DISCHARGE INSTRUCTIONS
Acute Respiratory Failure, Adult  Acute respiratory failure occurs when there is not enough oxygen passing from your lungs to your body. When this happens, your lungs have trouble removing carbon dioxide from the blood. This causes your blood oxygen level to drop too low as carbon dioxide builds up.  Acute respiratory failure is a medical emergency. It can develop quickly, but it is temporary if treated promptly. Your lung capacity, or how much air your lungs can hold, may improve with time, exercise, and treatment.  What are the causes?  There are many possible causes of acute respiratory failure, including:  · Lung injury.  · Chest injury or damage to the ribs or tissues near the lungs.  · Lung conditions that affect the flow of air and blood into and out of the lungs, such as pneumonia, acute respiratory distress syndrome, and cystic fibrosis.  · Medical conditions, such as strokes or spinal cord injuries, that affect the muscles and nerves that control breathing.  · Blood infection (sepsis).  · Inflammation of the pancreas (pancreatitis).  · A blood clot in the lungs (pulmonary embolism).  · A large-volume blood transfusion.  · Burns.  · Near-drowning.  · Seizure.  · Smoke inhalation.  · Reaction to medicines.  · Alcohol or drug overdose.  What increases the risk?  This condition is more likely to develop in people who have:  · A blocked airway.  · Asthma.  · A condition or disease that damages or weakens the muscles, nerves, bones, or tissues that are involved in breathing.  · A serious infection.  · A health problem that blocks the unconscious reflex that is involved in breathing, such as hypothyroidism or sleep apnea.  · A lung injury or trauma.  What are the signs or symptoms?  Trouble breathing is the main symptom of acute respiratory failure. Symptoms may also include:  · Rapid breathing.  · Restlessness or anxiety.  · Skin, lips, or fingernails that appear blue (cyanosis).  · Rapid heart rate.  · Abnormal  heart rhythms (arrhythmias).  · Confusion or changes in behavior.  · Tiredness or loss of energy.  · Feeling sleepy or having a loss of consciousness.  How is this diagnosed?  Your health care provider can diagnose acute respiratory failure with a medical history and physical exam. During the exam, your health care provider will listen to your heart and check for crackling or wheezing sounds in your lungs. Your may also have tests to confirm the diagnosis and determine what is causing respiratory failure. These tests may include:  · Measuring the amount of oxygen in your blood (pulse oximetry). The measurement comes from a small device that is placed on your finger, earlobe, or toe.  · Other blood tests to measure blood gases and to look for signs of infection.  · Sampling your cerebral spinal fluid or tracheal fluid to check for infections.  · Chest X-ray to look for fluid in spaces that should be filled with air.  · Electrocardiogram (ECG) to look at the heart's electrical activity.  How is this treated?  Treatment for this condition usually takes places in a hospital intensive care unit (ICU). Treatment depends on what is causing the condition. It may include one or more treatments until your symptoms improve. Treatment may include:  · Supplemental oxygen. Extra oxygen is given through a tube in the nose, a face mask, or a loja.  · A device such as a continuous positive airway pressure (CPAP) or bi-level positive airway pressure (BiPAP or BPAP) machine. This treatment uses mild air pressure to keep the airways open. A mask or other device will be placed over your nose or mouth. A tube that is connected to a motor will deliver oxygen through the mask.  · Ventilator. This treatment helps move air into and out of the lungs. This may be done with a bag and mask or a machine. For this treatment, a tube is placed in your windpipe (trachea) so air and oxygen can flow to the lungs.  · Extracorporeal membrane oxygenation  (ECMO). This treatment temporarily takes over the function of the heart and lungs, supplying oxygen and removing carbon dioxide. ECMO gives the lungs a chance to recover. It may be used if a ventilator is not effective.  · Tracheostomy. This is a procedure that creates a hole in the neck to insert a breathing tube.  · Receiving fluids and medicines.  · Rocking the bed to help breathing.  Follow these instructions at home:  · Take over-the-counter and prescription medicines only as told by your health care provider.  · Return to normal activities as told by your health care provider. Ask your health care provider what activities are safe for you.  · Keep all follow-up visits as told by your health care provider. This is important.  How is this prevented?  Treating infections and medical conditions that may lead to acute respiratory failure can help prevent the condition from developing.  Contact a health care provider if:  · You have a fever.  · Your symptoms do not improve or they get worse.  Get help right away if:  · You are having trouble breathing.  · You lose consciousness.  · Your have cyanosis or turn blue.  · You develop a rapid heart rate.  · You are confused.  These symptoms may represent a serious problem that is an emergency. Do not wait to see if the symptoms will go away. Get medical help right away. Call your local emergency services (911 in the [place] Do not drive yourself to the hospital.   This information is not intended to replace advice given to you by your health care provider. Make sure you discuss any questions you have with your health care provider.  Document Released: 12/23/2014 Document Revised: 07/15/2017 Document Reviewed: 07/05/2017  Elsevier Interactive Patient Education © 2017 [employer]      HF Patient Discharge Instructions  · Monitor your weight daily, and maintain a weight chart, to track your weight changes.   · Activity as tolerated, unless your Doctor has ordered otherwise.  Other activity order: regular.  · Follow a low fat, low cholesterol, low salt diet unless instructed otherwise by your Doctor. Read the labels on the back of food products and track your intake of fat, cholesterol and salt.   · Fluid Restriction No. If a Fluid Restriction has been ordered by your Doctor, measure fluids with a measuring cup to ensure that you are not exceeding the restriction.   · No smoking.  · Oxygen Yes. If your Doctor has ordered that you wear Oxygen at home, it is important to wear it as ordered.  · Did you receive an explanation from staff on the importance of taking each of your medications and why it is necessary to stay on the medications the physician/care provider has ordered? Yes  · Do you have any questions concerning how to manage your heart failure and what to do should you have any increased signs and symptoms after you go home? No  · Do you feel like your heart failure care team involved you in the care treatment plan and allowed you to make decisions regarding your care while in the hospital and addressed any discharge needs you might have? Yes    See the educational handout provided at discharge for more information on monitoring your daily weight, activity and diet. This also explains more about Heart Failure, symptoms of a flare-up and some of the tests that you have undergone.     Warning Signs of a Flare-Up include:  · Swelling in the ankles or lower legs.  · Shortness of breath, while at rest, or while doing normal activities.   · Shortness of breath at night when in bed, or coughing in bed.   · Requiring more pillows to sleep at night, or needing to sit up at night to sleep.  · Feeling weak, dizzy or fatigued.     When to call your Doctor:  · Call [facility] about questions regarding the discharge instructions you were given [phone].  (Discharge Unit M/T)  · Call your Primary Care Physician or Cardiologist if:   1. You experience any pain  radiating to your jaw or neck.  2. You have any difficulty breathing.  3. You experience weight gain of 2 lbs in a day or 5 lbs in a week.   4. You feel any palpitations or irregular heartbeats.  5. You become dizzy or lose consciousness.   If you have had an angiogram or had a pacemaker or AICD placed, and experience:  1. Bleeding, drainage or swelling at the surgical / puncture site.  2. Fever greater than 100.0 F  3. Shock from internal defibrillator.  4. Cool and / or numb extremities.      Discharge Instructions    Discharged to home by car with relative. Discharged via wheelchair, hospital escort: Yes.  Special equipment needed: Not Applicable    Be sure to schedule a follow-up appointment with your primary care doctor or any specialists as instructed.     Discharge Plan:   Diet Plan: Discussed  Activity Level: Discussed  Confirmed Follow up Appointment: Patient to Call and Schedule Appointment  Confirmed Symptoms Management: Discussed  Medication Reconciliation Updated: Yes  Pneumococcal Vaccine Administered/Refused: Not given - Patient refused pneumococcal vaccine  Influenza Vaccine Indication: Not indicated: Previously immunized this influenza season and > 8 years of age    I understand that a diet low in cholesterol, fat, and sodium is recommended for good health. Unless I have been given specific instructions below for another diet, I accept this instruction as my diet prescription.   Other diet: regular    Special Instructions: None    · Is patient discharged on Warfarin / Coumadin?   No     Depression / Suicide Risk    As you are discharged from this [facility] facility, it is important to learn how to keep safe from harming yourself.    Recognize the warning signs:  · Abrupt changes in personality, positive or negative- including increase in energy   · Giving away possessions  · Change in eating patterns- significant weight changes-  positive or negative  · Change in sleeping patterns- unable to  sleep or sleeping all the time   · Unwillingness or inability to communicate  · Depression  · Unusual sadness, discouragement and loneliness  · Talk of wanting to die  · Neglect of personal appearance   · Rebelliousness- reckless behavior  · Withdrawal from people/activities they love  · Confusion- inability to concentrate     If you or a loved one observes any of these behaviors or has concerns about self-harm, here's what you can do:  · Talk about it- your feelings and reasons for harming yourself  · Remove any means that you might use to hurt yourself (examples: pills, rope, extension cords, firearm)  · Get professional help from the community (Mental Health, Substance Abuse, psychological counseling)  · Do not be alone:Call your Safe Contact- someone whom you trust who will be there for you.  · Call your local CRISIS HOTLINE 720-1324 or 778-018-6559  · Call your local Children's Mobile Crisis Response Team [place] (588) 556-4505 or www.eEvent  · Call the toll free National Suicide Prevention Hotlines   · National Suicide Prevention Lifeline 027-948-NOMZ (2772)  · National Hope Line Network 800-SUICIDE (174-4992)

## 2019-05-28 NOTE — PROGRESS NOTES
Discharge order written. Belongings gathered. Pt and family states that all personal belongings are in possession. AVS printed, reviewed and copy signed and placed on the chart. Patient has no further questions. Prescriptions sent to [facility], but [facility] will be bringing medications to patient at home. Discharged in satisfactory condition home with family and hospice. Pt off unit via wheelchair, escorted by RUMA Palma.

## 2019-05-28 NOTE — CARE PLAN
Problem: Safety  Goal: Will remain free from falls  Outcome: PROGRESSING AS EXPECTED    Intervention: Assess risk factors for falls   05/28/19 0045   OTHER   Fall Risk High Risk to Fall - 2 or more points    Risk for Injury-Any positive answers results in the pt being at high risk for fall related injury Age: Over 85 Years   Mobility Status Assessment 1-1 Healthcare Provider Required for Assistance with Ambulation & Transfer   History of fall 2   Pt Calls for Assistance Yes     Intervention: Implement fall precautions   05/28/19 0045   OTHER   Environmental Precautions Treaded Slipper Socks on Patient;Personal Belongings, Wastebasket, Call Bell etc. in Easy Reach;Communication Sign for Patients & Families;Bed in Low Position;Report Given to Other Health Care Providers Regarding Fall Risk;Mobility Assessed & Appropriate Sign Placed   Chair/Bed Strip Alarm Yes - Alarm On   Bed Alarm Yes - Alarm On         Problem: Venous Thromboembolism (VTW)/Deep Vein Thrombosis (DVT) Prevention:  Goal: Patient will participate in Venous Thrombosis (VTE)/Deep Vein Thrombosis (DVT)Prevention Measures  Outcome: PROGRESSING AS EXPECTED   05/27/19 2000   OTHER   Pharmacologic Prophylaxis Used LMWH: Enoxaparin(Lovenox)       Problem: Fluid Volume:  Goal: Will maintain balanced intake and output  Outcome: PROGRESSING AS EXPECTED  Compliant with 1000mL fluid restriction. Monitoring I/O, daily weights, diuretics administered per emar.

## 2019-05-28 NOTE — CARE PLAN
Problem: Safety  Goal: Will remain free from falls  Outcome: PROGRESSING AS EXPECTED  Reinforced fall risk precautions. Bed alarm on, Non-skid socks on feet, call light in reach. 1 person assist to the bathroom with walker.      Problem: Venous Thromboembolism (VTW)/Deep Vein Thrombosis (DVT) Prevention:  Goal: Patient will participate in Venous Thrombosis (VTE)/Deep Vein Thrombosis (DVT)Prevention Measures  Outcome: PROGRESSING AS EXPECTED  Lovenox given per ordered schedule.     Problem: Respiratory:  Goal: Respiratory status will improve  Outcome: PROGRESSING AS EXPECTED  On 2L via NC. This is his home dose. No SOB at rest.     Problem: Skin Integrity  Goal: Risk for impaired skin integrity will decrease  Outcome: PROGRESSING AS EXPECTED  Patient can turn himself. Incontinent at times, monitor for wetness and keep skin CDI. Moisture barrier used. Encourage good PO intake.

## 2019-05-29 ENCOUNTER — HOME CARE VISIT (OUTPATIENT)
Dept: HOSPICE | Facility: HOSPICE | Age: 84
End: 2019-05-29
Payer: MEDICARE

## 2019-05-29 ENCOUNTER — HOME CARE VISIT (OUTPATIENT)
Dept: HOME HEALTH SERVICES | Facility: HOME HEALTHCARE | Age: 84
End: 2019-05-29
Payer: MEDICARE

## 2019-05-29 VITALS — HEART RATE: 80 BPM | RESPIRATION RATE: 40 BRPM | DIASTOLIC BLOOD PRESSURE: 76 MMHG | SYSTOLIC BLOOD PRESSURE: 116 MMHG

## 2019-05-29 PROCEDURE — S9126 HOSPICE CARE, IN THE HOME, P: HCPCS

## 2019-05-29 PROCEDURE — G0299 HHS/HOSPICE OF RN EA 15 MIN: HCPCS

## 2019-05-29 SDOH — ECONOMIC STABILITY: HOUSING INSECURITY: EVIDENCE OF SMOKING MATERIAL: 0

## 2019-05-29 ASSESSMENT — ENCOUNTER SYMPTOMS
DYSPNEA ACTIVITY LEVEL: AT REST
SHORTNESS OF BREATH: 1
LAST BOWEL MOVEMENT: 65161
DRY SKIN: 1
DIARRHEA: 1

## 2019-05-29 ASSESSMENT — SOCIAL DETERMINANTS OF HEALTH (SDOH): ACTIVE STRESSOR - NO STRESS FACTORS: 1

## 2019-05-29 ASSESSMENT — ACTIVITIES OF DAILY LIVING (ADL): MONEY MANAGEMENT (EXPENSES/BILLS): TOTALLY DEPENDENT

## 2019-05-29 NOTE — DISCHARGE SUMMARY
Discharge Summary    CHIEF COMPLAINT ON ADMISSION  Chief Complaint   Patient presents with   • Shortness of Breath       Reason for Admission  Sent by MD     Admission Date  5/22/2019    CODE STATUS  Prior    HPI & HOSPITAL COURSE  History of Presenting Illness (Dr. Frank H/P)  89 y.o. male who presented 5/22/2019 with complaints of pressure across his chest and severe shortness of breath whenever he does anything.  He has a past medical history of congestive heart failure 6 months ago EF was 45 and he had moderate pulmonary hypertension he also has a history of O2 dependent COPD supposed to be on 2 L chronically but seems to use it on a as needed basis and does not wear it at all at night.  He has been having increased swelling in his feet but cannot tell me for how long.  It does not appear that he is on a low-sodium diet.  He does not get any chest pressure or shortness of breath if he is not exerting himself.  Patient was here in March with multiple rib fractures those are still apparent on his imaging studies.  He has a cough but is nonproductive, his appetite has been fairly normal, after he was discharged from here he went to life care for 1 month he has been home for 3 weeks-he says he has been doing poorly the whole time but per his family he was doing very well when he first returned from [facility] and has been dwindling ever since.  The patient denies any dizziness or headache he denies palpitations he denies increased cough fever or chills.    Hospital course, he was found to have mild fluid overload he was started on Lasix.  He had a stress test which showed mild ischemic abnormality.  Due to his advanced age and failure to respond to therapy his family elected to take him home on hospice care.  He was started on a statin which the are going to continue for a while at this time.  Patient is comfortable and wishes to go home with hospice care.       Therefore, he is discharged in guarded and stable  condition to hospice.    The patient met 2-midnight criteria for an inpatient stay at the time of discharge.    Discharge Date  5/28/2019    FOLLOW UP ITEMS POST DISCHARGE  Hospice    DISCHARGE DIAGNOSES  Active Problems:    Chronic obstructive pulmonary disease with acute exacerbation (HCC) POA: Yes    Acute on chronic respiratory failure with hypoxia (HCC) POA: Yes    Abnormal stress test POA: Yes    Acute exacerbation of CHF (congestive heart failure) (HCC) POA: Yes    D-dimer, elevated POA: Unknown    Acute on chronic respiratory failure with hypoxemia (HCC) POA: Unknown    Multiple rib fractures POA: Unknown    Coronary artery disease POA: Unknown    Severe protein-calorie malnutrition (HCC) POA: Unknown  Resolved Problems:    * No resolved hospital problems. *      FOLLOW UP  Future Appointments  Date Time Provider Department Center   5/30/2019 11:00 AM [facility] None     Ihsan Monet M.D.  [address] [facility] [address]  [phone]      [facility]  called office and left a voice mail requesting office to call to schedule appointment. If you do not hear from them please call to schedule. Thank you    Ihsan Monet M.D.  [address] [address]  [phone]          Kelley Infante M.D.  [address] [id number] [phone]            MEDICATIONS ON DISCHARGE     Medication List      START taking these medications      Instructions   furosemide 20 MG Tabs  Commonly known as:  LASIX   Take 1 Tab by mouth every day.  Dose:  20 mg     potassium chloride SA 20 MEQ Tbcr  Commonly known as:  Kdur   Take 1 Tab by mouth every day.  Dose:  20 mEq        CONTINUE taking these medications      Instructions   carvedilol 6.25 MG Tabs  Commonly known as:  COREG   Take 6.25 mg by mouth 2 Times a Day.  Dose:  6.25 mg     D3 HIGH POTENCY 1000 UNIT Caps  Generic drug:  Cholecalciferol   Take 1,000 Units by mouth every day at 6 PM.  Dose:  1000 Units      enalapril 10 MG Tabs  Commonly known as:  VASOTEC   Take 10 mg by mouth 2 Times a Day.  Dose:  10 mg     predniSONE 10 MG Tabs  Commonly known as:  DELTASONE   Take 10 mg by mouth every day.  Dose:  10 mg            Allergies  No Known Allergies    DIET  Regular diet as tolerated    ACTIVITY  As tolerated.  Weight bearing as tolerated    CONSULTATIONS  Hospice    PROCEDURES  None    LABORATORY  Lab Results   Component Value Date    SODIUM 129 (L) 05/28/2019    POTASSIUM 4.2 05/28/2019    CHLORIDE 93 (L) 05/28/2019    CO2 32 05/28/2019    GLUCOSE 112 (H) 05/28/2019    BUN 28 (H) 05/28/2019    CREATININE 0.86 05/28/2019    CREATININE 1.2 03/25/2008        Lab Results   Component Value Date    WBC 8.3 05/28/2019    HEMOGLOBIN 11.5 (L) 05/28/2019    HEMATOCRIT 35.8 (L) 05/28/2019    PLATELETCT 142 (L) 05/28/2019        Total time of the discharge process exceeds 32 minutes.

## 2019-05-30 ENCOUNTER — HOME CARE VISIT (OUTPATIENT)
Dept: HOSPICE | Facility: HOSPICE | Age: 84
End: 2019-05-30
Payer: MEDICARE

## 2019-05-30 PROCEDURE — S9126 HOSPICE CARE, IN THE HOME, P: HCPCS

## 2019-05-30 PROCEDURE — G0155 HHCP-SVS OF CSW,EA 15 MIN: HCPCS

## 2019-05-30 ASSESSMENT — ENCOUNTER SYMPTOMS: SLEEP QUALITY: ADEQUATE

## 2019-05-31 PROCEDURE — S9126 HOSPICE CARE, IN THE HOME, P: HCPCS

## 2019-06-01 PROCEDURE — S9126 HOSPICE CARE, IN THE HOME, P: HCPCS

## 2019-06-02 PROCEDURE — S9126 HOSPICE CARE, IN THE HOME, P: HCPCS

## 2019-06-03 ENCOUNTER — HOME CARE VISIT (OUTPATIENT)
Dept: HOSPICE | Facility: HOSPICE | Age: 84
End: 2019-06-03
Payer: MEDICARE

## 2019-06-03 ENCOUNTER — APPOINTMENT (OUTPATIENT)
Dept: HOSPICE | Facility: HOSPICE | Age: 84
End: 2019-06-03
Payer: MEDICARE

## 2019-06-03 PROCEDURE — S9126 HOSPICE CARE, IN THE HOME, P: HCPCS

## 2019-06-04 ENCOUNTER — HOME CARE VISIT (OUTPATIENT)
Dept: HOSPICE | Facility: HOSPICE | Age: 84
End: 2019-06-04
Payer: MEDICARE

## 2019-06-04 PROCEDURE — S9126 HOSPICE CARE, IN THE HOME, P: HCPCS

## 2019-06-05 ENCOUNTER — HOME CARE VISIT (OUTPATIENT)
Dept: HOSPICE | Facility: HOSPICE | Age: 84
End: 2019-06-05
Payer: MEDICARE

## 2019-06-05 ENCOUNTER — APPOINTMENT (OUTPATIENT)
Dept: HOSPICE | Facility: HOSPICE | Age: 84
End: 2019-06-05
Payer: MEDICARE

## 2019-06-05 VITALS — RESPIRATION RATE: 28 BRPM | SYSTOLIC BLOOD PRESSURE: 108 MMHG | DIASTOLIC BLOOD PRESSURE: 66 MMHG | HEART RATE: 56 BPM

## 2019-06-05 PROCEDURE — G0299 HHS/HOSPICE OF RN EA 15 MIN: HCPCS

## 2019-06-05 PROCEDURE — S9126 HOSPICE CARE, IN THE HOME, P: HCPCS

## 2019-06-05 SDOH — ECONOMIC STABILITY: HOUSING INSECURITY: EVIDENCE OF SMOKING MATERIAL: 0

## 2019-06-05 ASSESSMENT — ENCOUNTER SYMPTOMS
LOWER EXTREMITY EDEMA: 1
RESPIRATORY PAIN: 1
LAST BOWEL MOVEMENT: 65166
DRY SKIN: 1
STOOL FREQUENCY: LESS THAN DAILY
CONSTIPATION: 1

## 2019-06-05 ASSESSMENT — SOCIAL DETERMINANTS OF HEALTH (SDOH): ACTIVE STRESSOR - NO STRESS FACTORS: 1

## 2019-06-05 ASSESSMENT — ACTIVITIES OF DAILY LIVING (ADL): MONEY MANAGEMENT (EXPENSES/BILLS): TOTALLY DEPENDENT

## 2019-06-06 PROCEDURE — S9126 HOSPICE CARE, IN THE HOME, P: HCPCS

## 2019-06-07 ENCOUNTER — APPOINTMENT (OUTPATIENT)
Dept: HOSPICE | Facility: HOSPICE | Age: 84
End: 2019-06-07
Payer: MEDICARE

## 2019-06-07 PROCEDURE — S9126 HOSPICE CARE, IN THE HOME, P: HCPCS

## 2019-06-08 PROCEDURE — S9126 HOSPICE CARE, IN THE HOME, P: HCPCS

## 2019-06-09 PROCEDURE — S9126 HOSPICE CARE, IN THE HOME, P: HCPCS

## 2019-06-10 ENCOUNTER — APPOINTMENT (OUTPATIENT)
Dept: HOSPICE | Facility: HOSPICE | Age: 84
End: 2019-06-10
Payer: MEDICARE

## 2019-06-10 PROCEDURE — S9126 HOSPICE CARE, IN THE HOME, P: HCPCS

## 2019-06-11 ENCOUNTER — HOME CARE VISIT (OUTPATIENT)
Dept: HOSPICE | Facility: HOSPICE | Age: 84
End: 2019-06-11
Payer: MEDICARE

## 2019-06-11 ENCOUNTER — APPOINTMENT (OUTPATIENT)
Dept: HOSPICE | Facility: HOSPICE | Age: 84
End: 2019-06-11
Payer: MEDICARE

## 2019-06-11 PROCEDURE — S9126 HOSPICE CARE, IN THE HOME, P: HCPCS

## 2019-06-12 ENCOUNTER — HOME CARE VISIT (OUTPATIENT)
Dept: HOSPICE | Facility: HOSPICE | Age: 84
End: 2019-06-12
Payer: MEDICARE

## 2019-06-12 VITALS — SYSTOLIC BLOOD PRESSURE: 110 MMHG | DIASTOLIC BLOOD PRESSURE: 84 MMHG | RESPIRATION RATE: 28 BRPM | HEART RATE: 68 BPM

## 2019-06-12 PROCEDURE — G0299 HHS/HOSPICE OF RN EA 15 MIN: HCPCS

## 2019-06-12 PROCEDURE — S9126 HOSPICE CARE, IN THE HOME, P: HCPCS

## 2019-06-12 SDOH — ECONOMIC STABILITY: HOUSING INSECURITY: EVIDENCE OF SMOKING MATERIAL: 0

## 2019-06-12 ASSESSMENT — ENCOUNTER SYMPTOMS
DESCRIPTION OF MEMORY LOSS: SHORT TERM
LOWER EXTREMITY EDEMA: 1
FATIGUE: 1
FORGETFULNESS: 1
LAST BOWEL MOVEMENT: 65174

## 2019-06-12 ASSESSMENT — SOCIAL DETERMINANTS OF HEALTH (SDOH)
ACTIVE STRESSOR - EXHAUSTION: 1
ACTIVE STRESSOR - LACK OF CAREGIVERS: 1

## 2019-06-13 ENCOUNTER — HOME CARE VISIT (OUTPATIENT)
Dept: HOSPICE | Facility: HOSPICE | Age: 84
End: 2019-06-13
Payer: MEDICARE

## 2019-06-13 PROCEDURE — S9126 HOSPICE CARE, IN THE HOME, P: HCPCS

## 2019-06-13 PROCEDURE — G0155 HHCP-SVS OF CSW,EA 15 MIN: HCPCS

## 2019-06-13 ASSESSMENT — SOCIAL DETERMINANTS OF HEALTH (SDOH): ACTIVE STRESSOR - LOSS OF CONTROL: 1

## 2019-06-14 ENCOUNTER — APPOINTMENT (OUTPATIENT)
Dept: HOSPICE | Facility: HOSPICE | Age: 84
End: 2019-06-14
Payer: MEDICARE

## 2019-06-14 PROCEDURE — S9126 HOSPICE CARE, IN THE HOME, P: HCPCS

## 2019-06-15 PROCEDURE — S9126 HOSPICE CARE, IN THE HOME, P: HCPCS

## 2019-06-16 PROCEDURE — S9126 HOSPICE CARE, IN THE HOME, P: HCPCS

## 2019-06-17 PROCEDURE — S9126 HOSPICE CARE, IN THE HOME, P: HCPCS

## 2019-06-18 ENCOUNTER — APPOINTMENT (OUTPATIENT)
Dept: HOSPICE | Facility: HOSPICE | Age: 84
End: 2019-06-18
Payer: MEDICARE

## 2019-06-18 PROCEDURE — S9126 HOSPICE CARE, IN THE HOME, P: HCPCS

## 2019-06-19 ENCOUNTER — HOME CARE VISIT (OUTPATIENT)
Dept: HOSPICE | Facility: HOSPICE | Age: 84
End: 2019-06-19
Payer: MEDICARE

## 2019-06-19 VITALS — HEART RATE: 60 BPM | DIASTOLIC BLOOD PRESSURE: 60 MMHG | SYSTOLIC BLOOD PRESSURE: 110 MMHG | RESPIRATION RATE: 28 BRPM

## 2019-06-19 PROCEDURE — S9126 HOSPICE CARE, IN THE HOME, P: HCPCS

## 2019-06-19 PROCEDURE — G0299 HHS/HOSPICE OF RN EA 15 MIN: HCPCS

## 2019-06-19 SDOH — ECONOMIC STABILITY: HOUSING INSECURITY: EVIDENCE OF SMOKING MATERIAL: 0

## 2019-06-19 ASSESSMENT — SOCIAL DETERMINANTS OF HEALTH (SDOH): ACTIVE STRESSOR - LACK OF CAREGIVERS: 1

## 2019-06-19 ASSESSMENT — ACTIVITIES OF DAILY LIVING (ADL): MONEY MANAGEMENT (EXPENSES/BILLS): TOTALLY DEPENDENT

## 2019-06-19 ASSESSMENT — ENCOUNTER SYMPTOMS
LOWER EXTREMITY EDEMA: 1
LAST BOWEL MOVEMENT: 65183

## 2019-06-20 PROCEDURE — S9126 HOSPICE CARE, IN THE HOME, P: HCPCS

## 2019-06-21 ENCOUNTER — APPOINTMENT (OUTPATIENT)
Dept: HOSPICE | Facility: HOSPICE | Age: 84
End: 2019-06-21
Payer: MEDICARE

## 2019-06-21 PROCEDURE — S9126 HOSPICE CARE, IN THE HOME, P: HCPCS

## 2019-06-22 PROCEDURE — S9126 HOSPICE CARE, IN THE HOME, P: HCPCS

## 2019-06-23 PROCEDURE — S9126 HOSPICE CARE, IN THE HOME, P: HCPCS

## 2019-06-24 PROCEDURE — S9126 HOSPICE CARE, IN THE HOME, P: HCPCS

## 2019-06-25 ENCOUNTER — APPOINTMENT (OUTPATIENT)
Dept: HOSPICE | Facility: HOSPICE | Age: 84
End: 2019-06-25
Payer: MEDICARE

## 2019-06-25 PROCEDURE — S9126 HOSPICE CARE, IN THE HOME, P: HCPCS

## 2019-06-26 ENCOUNTER — HOME CARE VISIT (OUTPATIENT)
Dept: HOSPICE | Facility: HOSPICE | Age: 84
End: 2019-06-26
Payer: MEDICARE

## 2019-06-26 VITALS — HEART RATE: 84 BPM | RESPIRATION RATE: 36 BRPM | SYSTOLIC BLOOD PRESSURE: 130 MMHG | DIASTOLIC BLOOD PRESSURE: 90 MMHG

## 2019-06-26 PROCEDURE — S9126 HOSPICE CARE, IN THE HOME, P: HCPCS

## 2019-06-26 PROCEDURE — G0299 HHS/HOSPICE OF RN EA 15 MIN: HCPCS

## 2019-06-26 PROCEDURE — G0155 HHCP-SVS OF CSW,EA 15 MIN: HCPCS

## 2019-06-26 SDOH — ECONOMIC STABILITY: HOUSING INSECURITY: EVIDENCE OF SMOKING MATERIAL: 0

## 2019-06-26 ASSESSMENT — ENCOUNTER SYMPTOMS
DEPRESSED MOOD: 1
LAST BOWEL MOVEMENT: 65190
LOWER EXTREMITY EDEMA: 1

## 2019-06-26 ASSESSMENT — SOCIAL DETERMINANTS OF HEALTH (SDOH)
ACTIVE STRESSOR - HEALTH CHANGES: 1
ACTIVE STRESSOR - EXPRESSED EMOTIONAL NEED: 1

## 2019-06-26 ASSESSMENT — ACTIVITIES OF DAILY LIVING (ADL): MONEY MANAGEMENT (EXPENSES/BILLS): TOTALLY DEPENDENT

## 2019-06-27 PROCEDURE — S9126 HOSPICE CARE, IN THE HOME, P: HCPCS

## 2019-06-27 ASSESSMENT — ENCOUNTER SYMPTOMS: SLEEP QUALITY: ADEQUATE

## 2019-06-27 ASSESSMENT — SOCIAL DETERMINANTS OF HEALTH (SDOH)
ACTIVE STRESSOR - LOSS OF CONTROL: 1
ACTIVE STRESSOR - EXPRESSED EMOTIONAL NEED: 1

## 2019-06-28 ENCOUNTER — APPOINTMENT (OUTPATIENT)
Dept: HOSPICE | Facility: HOSPICE | Age: 84
End: 2019-06-28
Payer: MEDICARE

## 2019-06-28 PROCEDURE — S9126 HOSPICE CARE, IN THE HOME, P: HCPCS

## 2019-06-29 PROCEDURE — S9126 HOSPICE CARE, IN THE HOME, P: HCPCS

## 2019-06-30 PROCEDURE — S9126 HOSPICE CARE, IN THE HOME, P: HCPCS

## 2019-07-01 ENCOUNTER — HOME CARE VISIT (OUTPATIENT)
Dept: HOSPICE | Facility: HOSPICE | Age: 84
End: 2019-07-01
Payer: MEDICARE

## 2019-07-01 PROCEDURE — S9126 HOSPICE CARE, IN THE HOME, P: HCPCS

## 2019-07-02 PROCEDURE — S9126 HOSPICE CARE, IN THE HOME, P: HCPCS

## 2019-07-03 PROCEDURE — S9126 HOSPICE CARE, IN THE HOME, P: HCPCS

## 2019-07-04 PROCEDURE — S9126 HOSPICE CARE, IN THE HOME, P: HCPCS

## 2019-07-05 ENCOUNTER — APPOINTMENT (OUTPATIENT)
Dept: HOSPICE | Facility: HOSPICE | Age: 84
End: 2019-07-05
Payer: MEDICARE

## 2019-07-05 ENCOUNTER — HOME CARE VISIT (OUTPATIENT)
Dept: HOSPICE | Facility: HOSPICE | Age: 84
End: 2019-07-05
Payer: MEDICARE

## 2019-07-05 PROCEDURE — S9126 HOSPICE CARE, IN THE HOME, P: HCPCS

## 2019-07-05 PROCEDURE — G0299 HHS/HOSPICE OF RN EA 15 MIN: HCPCS

## 2019-07-06 PROCEDURE — S9126 HOSPICE CARE, IN THE HOME, P: HCPCS

## 2019-07-07 PROCEDURE — S9126 HOSPICE CARE, IN THE HOME, P: HCPCS

## 2019-07-08 VITALS — SYSTOLIC BLOOD PRESSURE: 110 MMHG | RESPIRATION RATE: 18 BRPM | DIASTOLIC BLOOD PRESSURE: 60 MMHG | HEART RATE: 70 BPM

## 2019-07-08 PROCEDURE — S9126 HOSPICE CARE, IN THE HOME, P: HCPCS

## 2019-07-08 ASSESSMENT — ENCOUNTER SYMPTOMS
BOWEL PATTERN NORMAL: 1
DESCRIPTION OF MEMORY LOSS: SHORT TERM
SLEEP QUALITY: FAIR
STOOL FREQUENCY: DAILY
LAST BOWEL MOVEMENT: 65199
FATIGUES EASILY: 1

## 2019-07-08 ASSESSMENT — SOCIAL DETERMINANTS OF HEALTH (SDOH): ACTIVE STRESSOR - NO STRESS FACTORS: 1

## 2019-07-08 ASSESSMENT — ACTIVITIES OF DAILY LIVING (ADL): MONEY MANAGEMENT (EXPENSES/BILLS): TOTALLY DEPENDENT

## 2019-07-09 PROCEDURE — S9126 HOSPICE CARE, IN THE HOME, P: HCPCS

## 2019-07-10 ENCOUNTER — HOME CARE VISIT (OUTPATIENT)
Dept: HOSPICE | Facility: HOSPICE | Age: 84
End: 2019-07-10
Payer: MEDICARE

## 2019-07-10 VITALS — HEART RATE: 64 BPM | SYSTOLIC BLOOD PRESSURE: 120 MMHG | DIASTOLIC BLOOD PRESSURE: 62 MMHG | RESPIRATION RATE: 24 BRPM

## 2019-07-10 PROCEDURE — S9126 HOSPICE CARE, IN THE HOME, P: HCPCS

## 2019-07-10 PROCEDURE — G0299 HHS/HOSPICE OF RN EA 15 MIN: HCPCS

## 2019-07-10 SDOH — ECONOMIC STABILITY: HOUSING INSECURITY: EVIDENCE OF SMOKING MATERIAL: 0

## 2019-07-10 ASSESSMENT — ENCOUNTER SYMPTOMS
SHORTNESS OF BREATH: 1
LOWER EXTREMITY EDEMA: 1
LAST BOWEL MOVEMENT: 65202
DYSPNEA ACTIVITY LEVEL: WHILE SPEAKING
FATIGUES EASILY: 1
STOOL FREQUENCY: LESS THAN DAILY

## 2019-07-10 ASSESSMENT — ACTIVITIES OF DAILY LIVING (ADL): MONEY MANAGEMENT (EXPENSES/BILLS): TOTALLY DEPENDENT

## 2019-07-10 ASSESSMENT — SOCIAL DETERMINANTS OF HEALTH (SDOH): ACTIVE STRESSOR - NO STRESS FACTORS: 1

## 2019-07-11 PROCEDURE — S9126 HOSPICE CARE, IN THE HOME, P: HCPCS

## 2019-07-12 PROCEDURE — S9126 HOSPICE CARE, IN THE HOME, P: HCPCS

## 2019-07-13 PROCEDURE — S9126 HOSPICE CARE, IN THE HOME, P: HCPCS

## 2019-07-14 PROCEDURE — S9126 HOSPICE CARE, IN THE HOME, P: HCPCS

## 2019-07-15 PROCEDURE — S9126 HOSPICE CARE, IN THE HOME, P: HCPCS

## 2019-07-16 PROCEDURE — S9126 HOSPICE CARE, IN THE HOME, P: HCPCS

## 2019-07-17 ENCOUNTER — HOME CARE VISIT (OUTPATIENT)
Dept: HOSPICE | Facility: HOSPICE | Age: 84
End: 2019-07-17
Payer: MEDICARE

## 2019-07-17 VITALS — DIASTOLIC BLOOD PRESSURE: 60 MMHG | RESPIRATION RATE: 24 BRPM | SYSTOLIC BLOOD PRESSURE: 110 MMHG | HEART RATE: 68 BPM

## 2019-07-17 PROCEDURE — G0299 HHS/HOSPICE OF RN EA 15 MIN: HCPCS

## 2019-07-17 PROCEDURE — S9126 HOSPICE CARE, IN THE HOME, P: HCPCS

## 2019-07-17 SDOH — ECONOMIC STABILITY: HOUSING INSECURITY: EVIDENCE OF SMOKING MATERIAL: 0

## 2019-07-17 ASSESSMENT — ENCOUNTER SYMPTOMS
FATIGUES EASILY: 1
DYSPNEA ACTIVITY LEVEL: WHILE SPEAKING
SHORTNESS OF BREATH: 1
FORGETFULNESS: 1
LAST BOWEL MOVEMENT: 65210
LOWER EXTREMITY EDEMA: 1

## 2019-07-17 ASSESSMENT — ACTIVITIES OF DAILY LIVING (ADL): MONEY MANAGEMENT (EXPENSES/BILLS): TOTALLY DEPENDENT

## 2019-07-17 ASSESSMENT — SOCIAL DETERMINANTS OF HEALTH (SDOH): ACTIVE STRESSOR - LACK OF CAREGIVERS: 1

## 2019-07-18 ENCOUNTER — HOME CARE VISIT (OUTPATIENT)
Dept: HOSPICE | Facility: HOSPICE | Age: 84
End: 2019-07-18
Payer: MEDICARE

## 2019-07-18 PROCEDURE — S9126 HOSPICE CARE, IN THE HOME, P: HCPCS

## 2019-07-19 PROCEDURE — S9126 HOSPICE CARE, IN THE HOME, P: HCPCS

## 2019-07-20 PROCEDURE — S9126 HOSPICE CARE, IN THE HOME, P: HCPCS

## 2019-07-21 PROCEDURE — S9126 HOSPICE CARE, IN THE HOME, P: HCPCS

## 2019-07-22 PROCEDURE — S9126 HOSPICE CARE, IN THE HOME, P: HCPCS

## 2019-07-23 ENCOUNTER — APPOINTMENT (OUTPATIENT)
Dept: HOSPICE | Facility: HOSPICE | Age: 84
End: 2019-07-23
Payer: MEDICARE

## 2019-07-23 PROCEDURE — S9126 HOSPICE CARE, IN THE HOME, P: HCPCS

## 2019-07-24 ENCOUNTER — HOME CARE VISIT (OUTPATIENT)
Dept: HOSPICE | Facility: HOSPICE | Age: 84
End: 2019-07-24
Payer: MEDICARE

## 2019-07-24 VITALS — RESPIRATION RATE: 72 BRPM | DIASTOLIC BLOOD PRESSURE: 76 MMHG | SYSTOLIC BLOOD PRESSURE: 120 MMHG | HEART RATE: 28 BPM

## 2019-07-24 PROCEDURE — G0299 HHS/HOSPICE OF RN EA 15 MIN: HCPCS

## 2019-07-24 PROCEDURE — S9126 HOSPICE CARE, IN THE HOME, P: HCPCS

## 2019-07-24 SDOH — ECONOMIC STABILITY: HOUSING INSECURITY: EVIDENCE OF SMOKING MATERIAL: 0

## 2019-07-24 ASSESSMENT — ENCOUNTER SYMPTOMS
LAST BOWEL MOVEMENT: 65217
FATIGUES EASILY: 1
DYSPNEA ACTIVITY LEVEL: WHILE SPEAKING
LOWER EXTREMITY EDEMA: 1
SHORTNESS OF BREATH: 1

## 2019-07-24 ASSESSMENT — ACTIVITIES OF DAILY LIVING (ADL): MONEY MANAGEMENT (EXPENSES/BILLS): TOTALLY DEPENDENT

## 2019-07-24 ASSESSMENT — SOCIAL DETERMINANTS OF HEALTH (SDOH): ACTIVE STRESSOR - NO STRESS FACTORS: 1

## 2019-07-25 PROCEDURE — S9126 HOSPICE CARE, IN THE HOME, P: HCPCS

## 2019-07-26 ENCOUNTER — HOME CARE VISIT (OUTPATIENT)
Dept: HOSPICE | Facility: HOSPICE | Age: 84
End: 2019-07-26
Payer: MEDICARE

## 2019-07-26 PROCEDURE — S9126 HOSPICE CARE, IN THE HOME, P: HCPCS

## 2019-07-27 PROCEDURE — S9126 HOSPICE CARE, IN THE HOME, P: HCPCS

## 2019-07-28 PROCEDURE — S9126 HOSPICE CARE, IN THE HOME, P: HCPCS

## 2019-07-29 ENCOUNTER — HOME CARE VISIT (OUTPATIENT)
Dept: HOSPICE | Facility: HOSPICE | Age: 84
End: 2019-07-29
Payer: MEDICARE

## 2019-07-29 PROCEDURE — S9126 HOSPICE CARE, IN THE HOME, P: HCPCS

## 2019-07-30 PROCEDURE — S9126 HOSPICE CARE, IN THE HOME, P: HCPCS

## 2019-07-31 ENCOUNTER — HOME CARE VISIT (OUTPATIENT)
Dept: HOSPICE | Facility: HOSPICE | Age: 84
End: 2019-07-31
Payer: MEDICARE

## 2019-07-31 VITALS — DIASTOLIC BLOOD PRESSURE: 60 MMHG | RESPIRATION RATE: 28 BRPM | HEART RATE: 88 BPM | SYSTOLIC BLOOD PRESSURE: 104 MMHG

## 2019-07-31 PROCEDURE — S9126 HOSPICE CARE, IN THE HOME, P: HCPCS

## 2019-07-31 PROCEDURE — G0299 HHS/HOSPICE OF RN EA 15 MIN: HCPCS

## 2019-07-31 SDOH — ECONOMIC STABILITY: HOUSING INSECURITY: EVIDENCE OF SMOKING MATERIAL: 0

## 2019-07-31 ASSESSMENT — ENCOUNTER SYMPTOMS
DYSPNEA ACTIVITY LEVEL: WHILE SPEAKING
FATIGUES EASILY: 1
LAST BOWEL MOVEMENT: 65224
FATIGUE: 1
FORGETFULNESS: 1
STOOL FREQUENCY: LESS THAN DAILY
LOWER EXTREMITY EDEMA: 1
SHORTNESS OF BREATH: 1

## 2019-07-31 ASSESSMENT — ACTIVITIES OF DAILY LIVING (ADL): MONEY MANAGEMENT (EXPENSES/BILLS): TOTALLY DEPENDENT

## 2019-07-31 ASSESSMENT — SOCIAL DETERMINANTS OF HEALTH (SDOH): ACTIVE STRESSOR - NO STRESS FACTORS: 1

## 2019-08-01 PROCEDURE — S9126 HOSPICE CARE, IN THE HOME, P: HCPCS

## 2019-08-02 PROCEDURE — S9126 HOSPICE CARE, IN THE HOME, P: HCPCS

## 2019-08-03 PROCEDURE — S9126 HOSPICE CARE, IN THE HOME, P: HCPCS

## 2019-08-04 PROCEDURE — S9126 HOSPICE CARE, IN THE HOME, P: HCPCS

## 2019-08-05 PROCEDURE — S9126 HOSPICE CARE, IN THE HOME, P: HCPCS

## 2019-08-06 PROCEDURE — S9126 HOSPICE CARE, IN THE HOME, P: HCPCS

## 2019-08-07 ENCOUNTER — HOME CARE VISIT (OUTPATIENT)
Dept: HOSPICE | Facility: HOSPICE | Age: 84
End: 2019-08-07
Payer: MEDICARE

## 2019-08-07 VITALS — SYSTOLIC BLOOD PRESSURE: 90 MMHG | DIASTOLIC BLOOD PRESSURE: 60 MMHG | HEART RATE: 64 BPM | RESPIRATION RATE: 24 BRPM

## 2019-08-07 PROCEDURE — G0299 HHS/HOSPICE OF RN EA 15 MIN: HCPCS

## 2019-08-07 PROCEDURE — S9126 HOSPICE CARE, IN THE HOME, P: HCPCS

## 2019-08-07 SDOH — ECONOMIC STABILITY: HOUSING INSECURITY: EVIDENCE OF SMOKING MATERIAL: 0

## 2019-08-07 ASSESSMENT — SOCIAL DETERMINANTS OF HEALTH (SDOH): ACTIVE STRESSOR - NO STRESS FACTORS: 1

## 2019-08-07 ASSESSMENT — ACTIVITIES OF DAILY LIVING (ADL): MONEY MANAGEMENT (EXPENSES/BILLS): TOTALLY DEPENDENT

## 2019-08-07 ASSESSMENT — ENCOUNTER SYMPTOMS
DYSPNEA ACTIVITY LEVEL: WHILE SPEAKING
LOWER EXTREMITY EDEMA: 1
STOOL FREQUENCY: LESS THAN DAILY
FORGETFULNESS: 1
SHORTNESS OF BREATH: 1
FATIGUE: 1
LAST BOWEL MOVEMENT: 65231
FATIGUES EASILY: 1

## 2019-08-08 ENCOUNTER — HOME CARE VISIT (OUTPATIENT)
Dept: HOSPICE | Facility: HOSPICE | Age: 84
End: 2019-08-08
Payer: MEDICARE

## 2019-08-08 PROCEDURE — S9126 HOSPICE CARE, IN THE HOME, P: HCPCS

## 2019-08-09 PROCEDURE — S9126 HOSPICE CARE, IN THE HOME, P: HCPCS

## 2019-08-10 PROCEDURE — S9126 HOSPICE CARE, IN THE HOME, P: HCPCS

## 2019-08-11 PROCEDURE — S9126 HOSPICE CARE, IN THE HOME, P: HCPCS

## 2019-08-12 PROCEDURE — S9126 HOSPICE CARE, IN THE HOME, P: HCPCS

## 2019-08-13 PROCEDURE — S9126 HOSPICE CARE, IN THE HOME, P: HCPCS

## 2019-08-14 ENCOUNTER — APPOINTMENT (OUTPATIENT)
Dept: HOSPICE | Facility: HOSPICE | Age: 84
End: 2019-08-14
Payer: MEDICARE

## 2019-08-14 ENCOUNTER — HOME CARE VISIT (OUTPATIENT)
Dept: HOSPICE | Facility: HOSPICE | Age: 84
End: 2019-08-14
Payer: MEDICARE

## 2019-08-14 PROCEDURE — S9126 HOSPICE CARE, IN THE HOME, P: HCPCS

## 2019-08-14 PROCEDURE — G0299 HHS/HOSPICE OF RN EA 15 MIN: HCPCS

## 2019-08-15 VITALS — RESPIRATION RATE: 20 BRPM | DIASTOLIC BLOOD PRESSURE: 70 MMHG | HEART RATE: 78 BPM | SYSTOLIC BLOOD PRESSURE: 108 MMHG

## 2019-08-15 PROCEDURE — S9126 HOSPICE CARE, IN THE HOME, P: HCPCS

## 2019-08-15 ASSESSMENT — ENCOUNTER SYMPTOMS
FATIGUE: 1
BOWEL PATTERN NORMAL: 1
LAST BOWEL MOVEMENT: 65238
DYSPNEA ACTIVITY LEVEL: WHILE SPEAKING
LOWER EXTREMITY EDEMA: 1
SHORTNESS OF BREATH: 1
STOOL FREQUENCY: LESS THAN DAILY
FORGETFULNESS: 1
FATIGUES EASILY: 1

## 2019-08-15 ASSESSMENT — SOCIAL DETERMINANTS OF HEALTH (SDOH): ACTIVE STRESSOR - NO STRESS FACTORS: 1

## 2019-08-15 ASSESSMENT — ACTIVITIES OF DAILY LIVING (ADL): MONEY MANAGEMENT (EXPENSES/BILLS): TOTALLY DEPENDENT

## 2019-08-16 PROCEDURE — S9126 HOSPICE CARE, IN THE HOME, P: HCPCS

## 2019-08-17 PROCEDURE — S9126 HOSPICE CARE, IN THE HOME, P: HCPCS

## 2019-08-18 PROCEDURE — S9126 HOSPICE CARE, IN THE HOME, P: HCPCS

## 2019-08-19 PROCEDURE — S9126 HOSPICE CARE, IN THE HOME, P: HCPCS

## 2019-08-20 PROCEDURE — S9126 HOSPICE CARE, IN THE HOME, P: HCPCS

## 2019-08-21 ENCOUNTER — HOME CARE VISIT (OUTPATIENT)
Dept: HOSPICE | Facility: HOSPICE | Age: 84
End: 2019-08-21
Payer: MEDICARE

## 2019-08-21 VITALS — DIASTOLIC BLOOD PRESSURE: 50 MMHG | SYSTOLIC BLOOD PRESSURE: 90 MMHG | HEART RATE: 68 BPM | RESPIRATION RATE: 32 BRPM

## 2019-08-21 PROCEDURE — G0299 HHS/HOSPICE OF RN EA 15 MIN: HCPCS

## 2019-08-21 PROCEDURE — S9126 HOSPICE CARE, IN THE HOME, P: HCPCS

## 2019-08-21 SDOH — ECONOMIC STABILITY: HOUSING INSECURITY: EVIDENCE OF SMOKING MATERIAL: 0

## 2019-08-21 ASSESSMENT — ENCOUNTER SYMPTOMS
LAST BOWEL MOVEMENT: 65243
CONTUSION: 1
SHORTNESS OF BREATH: 1
BOWEL PATTERN NORMAL: 1
FATIGUES EASILY: 1
FATIGUE: 1
STOOL FREQUENCY: LESS THAN DAILY
FORGETFULNESS: 1
LOWER EXTREMITY EDEMA: 1
DYSPNEA ACTIVITY LEVEL: WHILE SPEAKING

## 2019-08-21 ASSESSMENT — SOCIAL DETERMINANTS OF HEALTH (SDOH): ACTIVE STRESSOR - NO STRESS FACTORS: 1

## 2019-08-21 ASSESSMENT — ACTIVITIES OF DAILY LIVING (ADL): MONEY MANAGEMENT (EXPENSES/BILLS): TOTALLY DEPENDENT

## 2019-08-22 ENCOUNTER — HOME CARE VISIT (OUTPATIENT)
Dept: HOSPICE | Facility: HOSPICE | Age: 84
End: 2019-08-22
Payer: MEDICARE

## 2019-08-22 PROCEDURE — S9126 HOSPICE CARE, IN THE HOME, P: HCPCS

## 2019-08-23 PROCEDURE — S9126 HOSPICE CARE, IN THE HOME, P: HCPCS

## 2019-08-24 PROCEDURE — S9126 HOSPICE CARE, IN THE HOME, P: HCPCS

## 2019-08-25 PROCEDURE — S9126 HOSPICE CARE, IN THE HOME, P: HCPCS

## 2019-08-26 PROCEDURE — S9126 HOSPICE CARE, IN THE HOME, P: HCPCS

## 2019-08-27 PROCEDURE — S9126 HOSPICE CARE, IN THE HOME, P: HCPCS

## 2019-08-28 ENCOUNTER — HOME CARE VISIT (OUTPATIENT)
Dept: HOSPICE | Facility: HOSPICE | Age: 84
End: 2019-08-28
Payer: MEDICARE

## 2019-08-28 VITALS — SYSTOLIC BLOOD PRESSURE: 100 MMHG | HEART RATE: 76 BPM | DIASTOLIC BLOOD PRESSURE: 74 MMHG | RESPIRATION RATE: 24 BRPM

## 2019-08-28 PROCEDURE — S9126 HOSPICE CARE, IN THE HOME, P: HCPCS

## 2019-08-28 PROCEDURE — G0299 HHS/HOSPICE OF RN EA 15 MIN: HCPCS

## 2019-08-28 SDOH — ECONOMIC STABILITY: HOUSING INSECURITY: EVIDENCE OF SMOKING MATERIAL: 0

## 2019-08-28 ASSESSMENT — ENCOUNTER SYMPTOMS
FATIGUE: 1
FATIGUES EASILY: 1
STOOL FREQUENCY: LESS THAN DAILY
LAST BOWEL MOVEMENT: 65253
LOWER EXTREMITY EDEMA: 1
DYSPNEA ACTIVITY LEVEL: WHILE SPEAKING
SHORTNESS OF BREATH: 1
CONTUSION: 1
BOWEL PATTERN NORMAL: 1
FORGETFULNESS: 1

## 2019-08-28 ASSESSMENT — ACTIVITIES OF DAILY LIVING (ADL): MONEY MANAGEMENT (EXPENSES/BILLS): TOTALLY DEPENDENT

## 2019-08-28 ASSESSMENT — SOCIAL DETERMINANTS OF HEALTH (SDOH): ACTIVE STRESSOR - NO STRESS FACTORS: 1

## 2019-08-29 PROCEDURE — S9126 HOSPICE CARE, IN THE HOME, P: HCPCS

## 2019-08-30 PROCEDURE — S9126 HOSPICE CARE, IN THE HOME, P: HCPCS

## 2019-08-31 PROCEDURE — S9126 HOSPICE CARE, IN THE HOME, P: HCPCS

## 2019-09-01 PROCEDURE — S9126 HOSPICE CARE, IN THE HOME, P: HCPCS

## 2019-09-02 PROCEDURE — S9126 HOSPICE CARE, IN THE HOME, P: HCPCS

## 2019-09-03 PROCEDURE — S9126 HOSPICE CARE, IN THE HOME, P: HCPCS

## 2019-09-04 ENCOUNTER — HOME CARE VISIT (OUTPATIENT)
Dept: HOSPICE | Facility: HOSPICE | Age: 84
End: 2019-09-04
Payer: MEDICARE

## 2019-09-04 VITALS — SYSTOLIC BLOOD PRESSURE: 132 MMHG | DIASTOLIC BLOOD PRESSURE: 70 MMHG | HEART RATE: 64 BPM | RESPIRATION RATE: 24 BRPM

## 2019-09-04 PROCEDURE — G0299 HHS/HOSPICE OF RN EA 15 MIN: HCPCS

## 2019-09-04 PROCEDURE — S9126 HOSPICE CARE, IN THE HOME, P: HCPCS

## 2019-09-04 SDOH — ECONOMIC STABILITY: HOUSING INSECURITY: EVIDENCE OF SMOKING MATERIAL: 0

## 2019-09-04 ASSESSMENT — ACTIVITIES OF DAILY LIVING (ADL): MONEY MANAGEMENT (EXPENSES/BILLS): TOTALLY DEPENDENT

## 2019-09-04 ASSESSMENT — ENCOUNTER SYMPTOMS
SHORTNESS OF BREATH: 1
BOWEL PATTERN NORMAL: 1
FATIGUE: 1
LOWER EXTREMITY EDEMA: 1
LAST BOWEL MOVEMENT: 65259
STOOL FREQUENCY: LESS THAN DAILY
DYSPNEA ACTIVITY LEVEL: WHILE SPEAKING
FORGETFULNESS: 1
FATIGUES EASILY: 1
CONTUSION: 1

## 2019-09-04 ASSESSMENT — SOCIAL DETERMINANTS OF HEALTH (SDOH): ACTIVE STRESSOR - NO STRESS FACTORS: 1

## 2019-09-05 PROCEDURE — S9126 HOSPICE CARE, IN THE HOME, P: HCPCS

## 2019-09-06 PROCEDURE — S9126 HOSPICE CARE, IN THE HOME, P: HCPCS

## 2019-09-07 PROCEDURE — S9126 HOSPICE CARE, IN THE HOME, P: HCPCS

## 2019-09-08 PROCEDURE — S9126 HOSPICE CARE, IN THE HOME, P: HCPCS

## 2019-09-09 PROCEDURE — S9126 HOSPICE CARE, IN THE HOME, P: HCPCS

## 2019-09-10 PROCEDURE — S9126 HOSPICE CARE, IN THE HOME, P: HCPCS

## 2019-09-11 ENCOUNTER — HOME CARE VISIT (OUTPATIENT)
Dept: HOSPICE | Facility: HOSPICE | Age: 84
End: 2019-09-11
Payer: MEDICARE

## 2019-09-11 VITALS — RESPIRATION RATE: 24 BRPM | HEART RATE: 64 BPM | DIASTOLIC BLOOD PRESSURE: 70 MMHG | SYSTOLIC BLOOD PRESSURE: 110 MMHG

## 2019-09-11 PROCEDURE — S9126 HOSPICE CARE, IN THE HOME, P: HCPCS

## 2019-09-11 PROCEDURE — 6650990 HC HOSPICE AND HOME CARE RX REV CODE 0250

## 2019-09-11 PROCEDURE — G0299 HHS/HOSPICE OF RN EA 15 MIN: HCPCS

## 2019-09-11 SDOH — ECONOMIC STABILITY: HOUSING INSECURITY: EVIDENCE OF SMOKING MATERIAL: 0

## 2019-09-11 ASSESSMENT — ENCOUNTER SYMPTOMS
STOOL FREQUENCY: LESS THAN DAILY
FORGETFULNESS: 1
LAST BOWEL MOVEMENT: 65266
BOWEL PATTERN NORMAL: 1
LOWER EXTREMITY EDEMA: 1
FATIGUES EASILY: 1
CONTUSION: 1
FATIGUE: 1

## 2019-09-11 ASSESSMENT — ACTIVITIES OF DAILY LIVING (ADL): MONEY MANAGEMENT (EXPENSES/BILLS): TOTALLY DEPENDENT

## 2019-09-11 ASSESSMENT — SOCIAL DETERMINANTS OF HEALTH (SDOH): ACTIVE STRESSOR - NO STRESS FACTORS: 1

## 2019-09-12 PROCEDURE — S9126 HOSPICE CARE, IN THE HOME, P: HCPCS

## 2019-09-13 PROCEDURE — S9126 HOSPICE CARE, IN THE HOME, P: HCPCS

## 2019-09-14 PROCEDURE — S9126 HOSPICE CARE, IN THE HOME, P: HCPCS

## 2019-09-15 PROCEDURE — S9126 HOSPICE CARE, IN THE HOME, P: HCPCS

## 2019-09-16 PROCEDURE — S9126 HOSPICE CARE, IN THE HOME, P: HCPCS

## 2019-09-17 PROCEDURE — S9126 HOSPICE CARE, IN THE HOME, P: HCPCS

## 2019-09-18 ENCOUNTER — APPOINTMENT (OUTPATIENT)
Dept: HOSPICE | Facility: HOSPICE | Age: 84
End: 2019-09-18
Payer: MEDICARE

## 2019-09-18 PROCEDURE — S9126 HOSPICE CARE, IN THE HOME, P: HCPCS

## 2019-09-19 ENCOUNTER — HOME CARE VISIT (OUTPATIENT)
Dept: HOSPICE | Facility: HOSPICE | Age: 84
End: 2019-09-19
Payer: MEDICARE

## 2019-09-19 VITALS — RESPIRATION RATE: 28 BRPM | SYSTOLIC BLOOD PRESSURE: 110 MMHG | HEART RATE: 68 BPM | DIASTOLIC BLOOD PRESSURE: 60 MMHG

## 2019-09-19 PROCEDURE — G0299 HHS/HOSPICE OF RN EA 15 MIN: HCPCS

## 2019-09-19 PROCEDURE — S9126 HOSPICE CARE, IN THE HOME, P: HCPCS

## 2019-09-19 SDOH — ECONOMIC STABILITY: HOUSING INSECURITY: EVIDENCE OF SMOKING MATERIAL: 0

## 2019-09-19 ASSESSMENT — ENCOUNTER SYMPTOMS
CONTUSION: 1
FATIGUE: 1
BOWEL PATTERN NORMAL: 1
FATIGUES EASILY: 1
FORGETFULNESS: 1
STOOL FREQUENCY: LESS THAN DAILY
LAST BOWEL MOVEMENT: 65273

## 2019-09-19 ASSESSMENT — ACTIVITIES OF DAILY LIVING (ADL): MONEY MANAGEMENT (EXPENSES/BILLS): TOTALLY DEPENDENT

## 2019-09-19 ASSESSMENT — SOCIAL DETERMINANTS OF HEALTH (SDOH): ACTIVE STRESSOR - NO STRESS FACTORS: 1

## 2019-09-20 PROCEDURE — S9126 HOSPICE CARE, IN THE HOME, P: HCPCS

## 2019-09-21 PROCEDURE — S9126 HOSPICE CARE, IN THE HOME, P: HCPCS

## 2019-09-22 PROCEDURE — S9126 HOSPICE CARE, IN THE HOME, P: HCPCS

## 2019-09-23 PROCEDURE — S9126 HOSPICE CARE, IN THE HOME, P: HCPCS

## 2019-09-24 PROCEDURE — S9126 HOSPICE CARE, IN THE HOME, P: HCPCS

## 2019-09-25 ENCOUNTER — APPOINTMENT (OUTPATIENT)
Dept: HOSPICE | Facility: HOSPICE | Age: 84
End: 2019-09-25
Payer: MEDICARE

## 2019-09-25 PROCEDURE — S9126 HOSPICE CARE, IN THE HOME, P: HCPCS

## 2019-09-26 PROCEDURE — S9126 HOSPICE CARE, IN THE HOME, P: HCPCS

## 2019-09-27 ENCOUNTER — HOME CARE VISIT (OUTPATIENT)
Dept: HOSPICE | Facility: HOSPICE | Age: 84
End: 2019-09-27
Payer: MEDICARE

## 2019-09-27 VITALS — SYSTOLIC BLOOD PRESSURE: 110 MMHG | RESPIRATION RATE: 36 BRPM | DIASTOLIC BLOOD PRESSURE: 70 MMHG | HEART RATE: 68 BPM

## 2019-09-27 PROCEDURE — G0299 HHS/HOSPICE OF RN EA 15 MIN: HCPCS

## 2019-09-27 PROCEDURE — S9126 HOSPICE CARE, IN THE HOME, P: HCPCS

## 2019-09-27 SDOH — ECONOMIC STABILITY: HOUSING INSECURITY: EVIDENCE OF SMOKING MATERIAL: 0

## 2019-09-27 ASSESSMENT — ENCOUNTER SYMPTOMS
FORGETFULNESS: 1
LOWER EXTREMITY EDEMA: 1
BOWEL PATTERN NORMAL: 1
STOOL FREQUENCY: LESS THAN DAILY
LAST BOWEL MOVEMENT: 65283
FATIGUES EASILY: 1
FATIGUE: 1

## 2019-09-27 ASSESSMENT — SOCIAL DETERMINANTS OF HEALTH (SDOH): ACTIVE STRESSOR - NO STRESS FACTORS: 1

## 2019-09-27 ASSESSMENT — ACTIVITIES OF DAILY LIVING (ADL): MONEY MANAGEMENT (EXPENSES/BILLS): TOTALLY DEPENDENT

## 2019-09-28 PROCEDURE — S9126 HOSPICE CARE, IN THE HOME, P: HCPCS

## 2019-09-29 PROCEDURE — S9126 HOSPICE CARE, IN THE HOME, P: HCPCS

## 2019-09-30 PROCEDURE — S9126 HOSPICE CARE, IN THE HOME, P: HCPCS

## 2019-10-01 ENCOUNTER — HOME CARE VISIT (OUTPATIENT)
Dept: HOSPICE | Facility: HOSPICE | Age: 84
End: 2019-10-01
Payer: MEDICARE

## 2019-10-01 PROCEDURE — S9126 HOSPICE CARE, IN THE HOME, P: HCPCS

## 2019-10-02 ENCOUNTER — HOME CARE VISIT (OUTPATIENT)
Dept: HOSPICE | Facility: HOSPICE | Age: 84
End: 2019-10-02
Payer: MEDICARE

## 2019-10-02 VITALS — HEART RATE: 60 BPM | SYSTOLIC BLOOD PRESSURE: 122 MMHG | RESPIRATION RATE: 24 BRPM | DIASTOLIC BLOOD PRESSURE: 66 MMHG

## 2019-10-02 PROCEDURE — S9126 HOSPICE CARE, IN THE HOME, P: HCPCS

## 2019-10-02 PROCEDURE — G0299 HHS/HOSPICE OF RN EA 15 MIN: HCPCS

## 2019-10-02 SDOH — ECONOMIC STABILITY: HOUSING INSECURITY: EVIDENCE OF SMOKING MATERIAL: 0

## 2019-10-02 ASSESSMENT — ACTIVITIES OF DAILY LIVING (ADL): MONEY MANAGEMENT (EXPENSES/BILLS): TOTALLY DEPENDENT

## 2019-10-02 ASSESSMENT — ENCOUNTER SYMPTOMS
FATIGUE: 1
FATIGUES EASILY: 1
LOWER EXTREMITY EDEMA: 1
STOOL FREQUENCY: LESS THAN DAILY
BOWEL PATTERN NORMAL: 1
FORGETFULNESS: 1
LAST BOWEL MOVEMENT: 65287

## 2019-10-02 ASSESSMENT — SOCIAL DETERMINANTS OF HEALTH (SDOH): ACTIVE STRESSOR - NO STRESS FACTORS: 1

## 2019-10-03 PROCEDURE — S9126 HOSPICE CARE, IN THE HOME, P: HCPCS

## 2019-10-03 PROCEDURE — 6650990 HC HOSPICE AND HOME CARE RX REV CODE 0250

## 2019-10-04 ENCOUNTER — HOME CARE VISIT (OUTPATIENT)
Dept: HOSPICE | Facility: HOSPICE | Age: 84
End: 2019-10-04
Payer: MEDICARE

## 2019-10-04 PROCEDURE — S9126 HOSPICE CARE, IN THE HOME, P: HCPCS

## 2019-10-04 PROCEDURE — G0299 HHS/HOSPICE OF RN EA 15 MIN: HCPCS

## 2019-10-05 PROCEDURE — S9126 HOSPICE CARE, IN THE HOME, P: HCPCS

## 2019-10-06 PROCEDURE — S9126 HOSPICE CARE, IN THE HOME, P: HCPCS

## 2019-10-07 PROCEDURE — S9126 HOSPICE CARE, IN THE HOME, P: HCPCS

## 2019-10-08 PROCEDURE — S9126 HOSPICE CARE, IN THE HOME, P: HCPCS

## 2019-10-09 ENCOUNTER — HOME CARE VISIT (OUTPATIENT)
Dept: HOSPICE | Facility: HOSPICE | Age: 84
End: 2019-10-09
Payer: MEDICARE

## 2019-10-09 PROCEDURE — G0299 HHS/HOSPICE OF RN EA 15 MIN: HCPCS

## 2019-10-09 PROCEDURE — S9126 HOSPICE CARE, IN THE HOME, P: HCPCS

## 2019-10-09 SDOH — ECONOMIC STABILITY: HOUSING INSECURITY: EVIDENCE OF SMOKING MATERIAL: 0

## 2019-10-09 ASSESSMENT — ENCOUNTER SYMPTOMS
BOWEL PATTERN NORMAL: 1
FORGETFULNESS: 1
FATIGUES EASILY: 1
CONTUSION: 1
STOOL FREQUENCY: LESS THAN DAILY
FATIGUE: 1
LOWER EXTREMITY EDEMA: 1

## 2019-10-09 ASSESSMENT — ACTIVITIES OF DAILY LIVING (ADL): MONEY MANAGEMENT (EXPENSES/BILLS): TOTALLY DEPENDENT

## 2019-10-09 ASSESSMENT — SOCIAL DETERMINANTS OF HEALTH (SDOH): ACTIVE STRESSOR - NO STRESS FACTORS: 1

## 2019-10-10 PROCEDURE — S9126 HOSPICE CARE, IN THE HOME, P: HCPCS

## 2019-10-11 PROCEDURE — S9126 HOSPICE CARE, IN THE HOME, P: HCPCS

## 2019-10-12 PROCEDURE — S9126 HOSPICE CARE, IN THE HOME, P: HCPCS

## 2019-10-13 PROCEDURE — S9126 HOSPICE CARE, IN THE HOME, P: HCPCS

## 2019-10-14 PROCEDURE — S9126 HOSPICE CARE, IN THE HOME, P: HCPCS

## 2019-10-15 PROCEDURE — S9126 HOSPICE CARE, IN THE HOME, P: HCPCS

## 2019-10-16 ENCOUNTER — HOME CARE VISIT (OUTPATIENT)
Dept: HOSPICE | Facility: HOSPICE | Age: 84
End: 2019-10-16
Payer: MEDICARE

## 2019-10-16 PROCEDURE — S9126 HOSPICE CARE, IN THE HOME, P: HCPCS

## 2019-10-16 PROCEDURE — 6650990 HC HOSPICE AND HOME CARE RX REV CODE 0250

## 2019-10-16 PROCEDURE — G0299 HHS/HOSPICE OF RN EA 15 MIN: HCPCS

## 2019-10-17 VITALS — HEART RATE: 60 BPM | SYSTOLIC BLOOD PRESSURE: 132 MMHG | DIASTOLIC BLOOD PRESSURE: 76 MMHG | RESPIRATION RATE: 24 BRPM

## 2019-10-17 PROCEDURE — S9126 HOSPICE CARE, IN THE HOME, P: HCPCS

## 2019-10-17 SDOH — ECONOMIC STABILITY: HOUSING INSECURITY: EVIDENCE OF SMOKING MATERIAL: 0

## 2019-10-17 ASSESSMENT — ENCOUNTER SYMPTOMS
BOWEL PATTERN NORMAL: 1
LOWER EXTREMITY EDEMA: 1
FORGETFULNESS: 1
FATIGUE: 1
STOOL FREQUENCY: LESS THAN DAILY
FATIGUES EASILY: 1
LAST BOWEL MOVEMENT: 65299
SKIN LESIONS: 1

## 2019-10-17 ASSESSMENT — SOCIAL DETERMINANTS OF HEALTH (SDOH): ACTIVE STRESSOR - NO STRESS FACTORS: 1

## 2019-10-17 ASSESSMENT — ACTIVITIES OF DAILY LIVING (ADL): MONEY MANAGEMENT (EXPENSES/BILLS): TOTALLY DEPENDENT

## 2019-10-18 PROCEDURE — S9126 HOSPICE CARE, IN THE HOME, P: HCPCS

## 2019-10-19 PROCEDURE — S9126 HOSPICE CARE, IN THE HOME, P: HCPCS

## 2019-10-20 PROCEDURE — S9126 HOSPICE CARE, IN THE HOME, P: HCPCS

## 2019-10-21 ENCOUNTER — HOME CARE VISIT (OUTPATIENT)
Dept: HOSPICE | Facility: HOSPICE | Age: 84
End: 2019-10-21
Payer: MEDICARE

## 2019-10-21 PROCEDURE — S9126 HOSPICE CARE, IN THE HOME, P: HCPCS

## 2019-10-22 PROCEDURE — S9126 HOSPICE CARE, IN THE HOME, P: HCPCS

## 2019-10-23 ENCOUNTER — HOME CARE VISIT (OUTPATIENT)
Dept: HOSPICE | Facility: HOSPICE | Age: 84
End: 2019-10-23
Payer: MEDICARE

## 2019-10-23 VITALS — RESPIRATION RATE: 36 BRPM | SYSTOLIC BLOOD PRESSURE: 120 MMHG | DIASTOLIC BLOOD PRESSURE: 60 MMHG | HEART RATE: 80 BPM

## 2019-10-23 PROCEDURE — G0299 HHS/HOSPICE OF RN EA 15 MIN: HCPCS

## 2019-10-23 PROCEDURE — S9126 HOSPICE CARE, IN THE HOME, P: HCPCS

## 2019-10-23 SDOH — ECONOMIC STABILITY: HOUSING INSECURITY: EVIDENCE OF SMOKING MATERIAL: 0

## 2019-10-23 ASSESSMENT — ENCOUNTER SYMPTOMS
LOWER EXTREMITY EDEMA: 1
BOWEL PATTERN NORMAL: 1
STOOL FREQUENCY: LESS THAN DAILY
LAST BOWEL MOVEMENT: 65307
FATIGUE: 1
FATIGUES EASILY: 1
FORGETFULNESS: 1

## 2019-10-23 ASSESSMENT — ACTIVITIES OF DAILY LIVING (ADL): MONEY MANAGEMENT (EXPENSES/BILLS): TOTALLY DEPENDENT

## 2019-10-23 ASSESSMENT — SOCIAL DETERMINANTS OF HEALTH (SDOH): ACTIVE STRESSOR - NO STRESS FACTORS: 1

## 2019-10-24 ENCOUNTER — HOME CARE VISIT (OUTPATIENT)
Dept: HOSPICE | Facility: HOSPICE | Age: 84
End: 2019-10-24
Payer: MEDICARE

## 2019-10-24 PROCEDURE — S9126 HOSPICE CARE, IN THE HOME, P: HCPCS

## 2019-10-24 PROCEDURE — G0155 HHCP-SVS OF CSW,EA 15 MIN: HCPCS

## 2019-10-24 ASSESSMENT — SOCIAL DETERMINANTS OF HEALTH (SDOH): ACTIVE STRESSOR - NO STRESS FACTORS: 1

## 2019-10-25 PROCEDURE — S9126 HOSPICE CARE, IN THE HOME, P: HCPCS

## 2019-10-26 PROCEDURE — S9126 HOSPICE CARE, IN THE HOME, P: HCPCS

## 2019-10-27 PROCEDURE — S9126 HOSPICE CARE, IN THE HOME, P: HCPCS

## 2019-10-28 PROCEDURE — S9126 HOSPICE CARE, IN THE HOME, P: HCPCS

## 2019-10-29 PROCEDURE — S9126 HOSPICE CARE, IN THE HOME, P: HCPCS

## 2019-10-30 ENCOUNTER — HOME CARE VISIT (OUTPATIENT)
Dept: HOSPICE | Facility: HOSPICE | Age: 84
End: 2019-10-30
Payer: MEDICARE

## 2019-10-30 VITALS — HEART RATE: 80 BPM | SYSTOLIC BLOOD PRESSURE: 120 MMHG | DIASTOLIC BLOOD PRESSURE: 80 MMHG | RESPIRATION RATE: 36 BRPM

## 2019-10-30 PROCEDURE — S9126 HOSPICE CARE, IN THE HOME, P: HCPCS

## 2019-10-30 PROCEDURE — G0299 HHS/HOSPICE OF RN EA 15 MIN: HCPCS

## 2019-10-30 SDOH — ECONOMIC STABILITY: HOUSING INSECURITY: EVIDENCE OF SMOKING MATERIAL: 0

## 2019-10-30 ASSESSMENT — ENCOUNTER SYMPTOMS
BOWEL PATTERN NORMAL: 1
LOWER EXTREMITY EDEMA: 1
FATIGUES EASILY: 1
FATIGUE: 1
STOOL FREQUENCY: LESS THAN DAILY
LAST BOWEL MOVEMENT: 65315
FORGETFULNESS: 1

## 2019-10-30 ASSESSMENT — SOCIAL DETERMINANTS OF HEALTH (SDOH): ACTIVE STRESSOR - NO STRESS FACTORS: 1

## 2019-10-30 ASSESSMENT — ACTIVITIES OF DAILY LIVING (ADL): MONEY MANAGEMENT (EXPENSES/BILLS): TOTALLY DEPENDENT

## 2019-10-31 PROCEDURE — S9126 HOSPICE CARE, IN THE HOME, P: HCPCS

## 2019-11-01 PROCEDURE — S9126 HOSPICE CARE, IN THE HOME, P: HCPCS

## 2019-11-02 PROCEDURE — S9126 HOSPICE CARE, IN THE HOME, P: HCPCS

## 2019-11-03 PROCEDURE — S9126 HOSPICE CARE, IN THE HOME, P: HCPCS

## 2019-11-04 ENCOUNTER — HOME CARE VISIT (OUTPATIENT)
Dept: HOSPICE | Facility: HOSPICE | Age: 84
End: 2019-11-04
Payer: MEDICARE

## 2019-11-04 PROCEDURE — S9126 HOSPICE CARE, IN THE HOME, P: HCPCS

## 2019-11-05 ENCOUNTER — HOME CARE VISIT (OUTPATIENT)
Dept: HOSPICE | Facility: HOSPICE | Age: 84
End: 2019-11-05
Payer: MEDICARE

## 2019-11-05 PROCEDURE — S9126 HOSPICE CARE, IN THE HOME, P: HCPCS

## 2019-11-06 ENCOUNTER — APPOINTMENT (OUTPATIENT)
Dept: HOSPICE | Facility: HOSPICE | Age: 84
End: 2019-11-06
Payer: MEDICARE

## 2019-11-06 PROCEDURE — S9126 HOSPICE CARE, IN THE HOME, P: HCPCS

## 2019-11-07 PROCEDURE — S9126 HOSPICE CARE, IN THE HOME, P: HCPCS

## 2019-11-08 ENCOUNTER — HOME CARE VISIT (OUTPATIENT)
Dept: HOSPICE | Facility: HOSPICE | Age: 84
End: 2019-11-08
Payer: MEDICARE

## 2019-11-08 VITALS — RESPIRATION RATE: 24 BRPM | DIASTOLIC BLOOD PRESSURE: 70 MMHG | SYSTOLIC BLOOD PRESSURE: 120 MMHG | HEART RATE: 60 BPM

## 2019-11-08 PROCEDURE — G0299 HHS/HOSPICE OF RN EA 15 MIN: HCPCS

## 2019-11-08 PROCEDURE — S9126 HOSPICE CARE, IN THE HOME, P: HCPCS

## 2019-11-08 SDOH — ECONOMIC STABILITY: HOUSING INSECURITY: EVIDENCE OF SMOKING MATERIAL: 0

## 2019-11-08 ASSESSMENT — ENCOUNTER SYMPTOMS
FATIGUES EASILY: 1
STOOL FREQUENCY: LESS THAN DAILY
BOWEL PATTERN NORMAL: 1
FORGETFULNESS: 1
LOWER EXTREMITY EDEMA: 1
LAST BOWEL MOVEMENT: 65324
FATIGUE: 1

## 2019-11-08 ASSESSMENT — ACTIVITIES OF DAILY LIVING (ADL): MONEY MANAGEMENT (EXPENSES/BILLS): TOTALLY DEPENDENT

## 2019-11-08 ASSESSMENT — SOCIAL DETERMINANTS OF HEALTH (SDOH): ACTIVE STRESSOR - NO STRESS FACTORS: 1

## 2019-11-09 PROCEDURE — S9126 HOSPICE CARE, IN THE HOME, P: HCPCS

## 2019-11-10 PROCEDURE — S9126 HOSPICE CARE, IN THE HOME, P: HCPCS

## 2019-11-11 PROCEDURE — S9126 HOSPICE CARE, IN THE HOME, P: HCPCS

## 2019-11-12 PROCEDURE — S9126 HOSPICE CARE, IN THE HOME, P: HCPCS

## 2019-11-13 ENCOUNTER — HOME CARE VISIT (OUTPATIENT)
Dept: HOSPICE | Facility: HOSPICE | Age: 84
End: 2019-11-13
Payer: MEDICARE

## 2019-11-13 VITALS — HEART RATE: 88 BPM | RESPIRATION RATE: 28 BRPM | SYSTOLIC BLOOD PRESSURE: 130 MMHG | DIASTOLIC BLOOD PRESSURE: 80 MMHG

## 2019-11-13 PROCEDURE — G0299 HHS/HOSPICE OF RN EA 15 MIN: HCPCS

## 2019-11-13 PROCEDURE — S9126 HOSPICE CARE, IN THE HOME, P: HCPCS

## 2019-11-13 SDOH — ECONOMIC STABILITY: HOUSING INSECURITY: EVIDENCE OF SMOKING MATERIAL: 0

## 2019-11-13 ASSESSMENT — ENCOUNTER SYMPTOMS
FORGETFULNESS: 1
BOWEL PATTERN NORMAL: 1
FATIGUE: 1
FATIGUES EASILY: 1
STOOL FREQUENCY: LESS THAN DAILY
LAST BOWEL MOVEMENT: 65330

## 2019-11-13 ASSESSMENT — ACTIVITIES OF DAILY LIVING (ADL): MONEY MANAGEMENT (EXPENSES/BILLS): TOTALLY DEPENDENT

## 2019-11-13 ASSESSMENT — SOCIAL DETERMINANTS OF HEALTH (SDOH): ACTIVE STRESSOR - NO STRESS FACTORS: 1

## 2019-11-14 PROCEDURE — S9126 HOSPICE CARE, IN THE HOME, P: HCPCS

## 2019-11-15 PROCEDURE — S9126 HOSPICE CARE, IN THE HOME, P: HCPCS

## 2019-11-16 PROCEDURE — S9126 HOSPICE CARE, IN THE HOME, P: HCPCS

## 2019-11-17 PROCEDURE — S9126 HOSPICE CARE, IN THE HOME, P: HCPCS

## 2019-11-18 PROCEDURE — S9126 HOSPICE CARE, IN THE HOME, P: HCPCS

## 2019-11-19 ENCOUNTER — HOME CARE VISIT (OUTPATIENT)
Dept: HOSPICE | Facility: HOSPICE | Age: 84
End: 2019-11-19
Payer: MEDICARE

## 2019-11-19 PROCEDURE — S9126 HOSPICE CARE, IN THE HOME, P: HCPCS

## 2019-11-20 ENCOUNTER — HOME CARE VISIT (OUTPATIENT)
Dept: HOSPICE | Facility: HOSPICE | Age: 84
End: 2019-11-20
Payer: MEDICARE

## 2019-11-20 VITALS — SYSTOLIC BLOOD PRESSURE: 118 MMHG | RESPIRATION RATE: 20 BRPM | HEART RATE: 88 BPM | DIASTOLIC BLOOD PRESSURE: 76 MMHG

## 2019-11-20 PROCEDURE — G0299 HHS/HOSPICE OF RN EA 15 MIN: HCPCS

## 2019-11-20 PROCEDURE — S9126 HOSPICE CARE, IN THE HOME, P: HCPCS

## 2019-11-20 SDOH — ECONOMIC STABILITY: HOUSING INSECURITY: EVIDENCE OF SMOKING MATERIAL: 0

## 2019-11-20 ASSESSMENT — ENCOUNTER SYMPTOMS
FORGETFULNESS: 1
BOWEL PATTERN NORMAL: 1
FATIGUES EASILY: 1
FATIGUE: 1
LAST BOWEL MOVEMENT: 65336
STOOL FREQUENCY: LESS THAN DAILY

## 2019-11-20 ASSESSMENT — ACTIVITIES OF DAILY LIVING (ADL): MONEY MANAGEMENT (EXPENSES/BILLS): TOTALLY DEPENDENT

## 2019-11-20 ASSESSMENT — SOCIAL DETERMINANTS OF HEALTH (SDOH): ACTIVE STRESSOR - NO STRESS FACTORS: 1

## 2019-11-21 PROCEDURE — S9126 HOSPICE CARE, IN THE HOME, P: HCPCS

## 2019-11-22 ENCOUNTER — HOME CARE VISIT (OUTPATIENT)
Dept: HOSPICE | Facility: HOSPICE | Age: 84
End: 2019-11-22
Payer: MEDICARE

## 2019-11-22 PROCEDURE — G0299 HHS/HOSPICE OF RN EA 15 MIN: HCPCS

## 2019-11-22 PROCEDURE — S9126 HOSPICE CARE, IN THE HOME, P: HCPCS

## 2020-01-19 ENCOUNTER — HOSPITAL ENCOUNTER (INPATIENT)
Facility: MEDICAL CENTER | Age: 85
LOS: 10 days | DRG: 177 | End: 2020-01-29
Attending: EMERGENCY MEDICINE | Admitting: HOSPITALIST
Payer: MEDICARE

## 2020-01-19 ENCOUNTER — APPOINTMENT (OUTPATIENT)
Dept: RADIOLOGY | Facility: MEDICAL CENTER | Age: 85
DRG: 177 | End: 2020-01-19
Attending: EMERGENCY MEDICINE
Payer: MEDICARE

## 2020-01-19 DIAGNOSIS — R06.02 SOB (SHORTNESS OF BREATH): ICD-10-CM

## 2020-01-19 DIAGNOSIS — R79.89 ELEVATED D-DIMER: ICD-10-CM

## 2020-01-19 DIAGNOSIS — I24.9 ACUTE CORONARY SYNDROME (HCC): ICD-10-CM

## 2020-01-19 DIAGNOSIS — R79.89 ELEVATED BRAIN NATRIURETIC PEPTIDE (BNP) LEVEL: ICD-10-CM

## 2020-01-19 DIAGNOSIS — R07.2 PRECORDIAL PAIN: ICD-10-CM

## 2020-01-19 DIAGNOSIS — R09.02 HYPOXIA: ICD-10-CM

## 2020-01-19 DIAGNOSIS — J96.21 ACUTE ON CHRONIC RESPIRATORY FAILURE WITH HYPOXEMIA (HCC): ICD-10-CM

## 2020-01-19 DIAGNOSIS — J42 CHRONIC BRONCHITIS, UNSPECIFIED CHRONIC BRONCHITIS TYPE (HCC): ICD-10-CM

## 2020-01-19 DIAGNOSIS — R53.81 DEBILITY: ICD-10-CM

## 2020-01-19 DIAGNOSIS — I50.33 ACUTE ON CHRONIC DIASTOLIC CONGESTIVE HEART FAILURE (HCC): ICD-10-CM

## 2020-01-19 DIAGNOSIS — J96.21 ACUTE ON CHRONIC RESPIRATORY FAILURE WITH HYPOXIA (HCC): ICD-10-CM

## 2020-01-19 PROBLEM — R07.9 PAIN IN THE CHEST: Status: ACTIVE | Noted: 2020-01-19

## 2020-01-19 LAB
ALBUMIN SERPL BCP-MCNC: 3.9 G/DL (ref 3.2–4.9)
ALBUMIN/GLOB SERPL: 1.8 G/DL
ALP SERPL-CCNC: 65 U/L (ref 30–99)
ALT SERPL-CCNC: 16 U/L (ref 2–50)
ANION GAP SERPL CALC-SCNC: 9 MMOL/L (ref 0–11.9)
APTT PPP: 27.7 SEC (ref 24.7–36)
AST SERPL-CCNC: 23 U/L (ref 12–45)
BASOPHILS # BLD AUTO: 0.4 % (ref 0–1.8)
BASOPHILS # BLD: 0.03 K/UL (ref 0–0.12)
BILIRUB SERPL-MCNC: 1.1 MG/DL (ref 0.1–1.5)
BUN SERPL-MCNC: 25 MG/DL (ref 8–22)
CALCIUM SERPL-MCNC: 8.6 MG/DL (ref 8.4–10.2)
CHLORIDE SERPL-SCNC: 97 MMOL/L (ref 96–112)
CO2 SERPL-SCNC: 29 MMOL/L (ref 20–33)
CREAT SERPL-MCNC: 1.1 MG/DL (ref 0.5–1.4)
D DIMER PPP IA.FEU-MCNC: 1.67 UG/ML (FEU) (ref 0–0.5)
EKG IMPRESSION: NORMAL
EOSINOPHIL # BLD AUTO: 0.07 K/UL (ref 0–0.51)
EOSINOPHIL NFR BLD: 0.9 % (ref 0–6.9)
ERYTHROCYTE [DISTWIDTH] IN BLOOD BY AUTOMATED COUNT: 54.1 FL (ref 35.9–50)
GLOBULIN SER CALC-MCNC: 2.2 G/DL (ref 1.9–3.5)
GLUCOSE SERPL-MCNC: 114 MG/DL (ref 65–99)
HCT VFR BLD AUTO: 37.4 % (ref 42–52)
HGB BLD-MCNC: 11.7 G/DL (ref 14–18)
IMM GRANULOCYTES # BLD AUTO: 0.07 K/UL (ref 0–0.11)
IMM GRANULOCYTES NFR BLD AUTO: 0.9 % (ref 0–0.9)
INR PPP: 1.18 (ref 0.87–1.13)
LYMPHOCYTES # BLD AUTO: 1.65 K/UL (ref 1–4.8)
LYMPHOCYTES NFR BLD: 22.1 % (ref 22–41)
MAGNESIUM SERPL-MCNC: 2 MG/DL (ref 1.5–2.5)
MCH RBC QN AUTO: 32.8 PG (ref 27–33)
MCHC RBC AUTO-ENTMCNC: 31.3 G/DL (ref 33.7–35.3)
MCV RBC AUTO: 104.8 FL (ref 81.4–97.8)
MONOCYTES # BLD AUTO: 0.88 K/UL (ref 0–0.85)
MONOCYTES NFR BLD AUTO: 11.8 % (ref 0–13.4)
NEUTROPHILS # BLD AUTO: 4.75 K/UL (ref 1.82–7.42)
NEUTROPHILS NFR BLD: 63.9 % (ref 44–72)
NRBC # BLD AUTO: 0 K/UL
NRBC BLD-RTO: 0 /100 WBC
NT-PROBNP SERPL IA-MCNC: ABNORMAL PG/ML (ref 0–125)
PLATELET # BLD AUTO: 147 K/UL (ref 164–446)
PMV BLD AUTO: 9.6 FL (ref 9–12.9)
POTASSIUM SERPL-SCNC: 4.6 MMOL/L (ref 3.6–5.5)
PROT SERPL-MCNC: 6.1 G/DL (ref 6–8.2)
PROTHROMBIN TIME: 15.2 SEC (ref 12–14.6)
RBC # BLD AUTO: 3.57 M/UL (ref 4.7–6.1)
SODIUM SERPL-SCNC: 135 MMOL/L (ref 135–145)
TROPONIN T SERPL-MCNC: 30 NG/L (ref 6–19)
TROPONIN T SERPL-MCNC: 31 NG/L (ref 6–19)
TSH SERPL DL<=0.005 MIU/L-ACNC: 1.04 UIU/ML (ref 0.38–5.33)
WBC # BLD AUTO: 7.5 K/UL (ref 4.8–10.8)

## 2020-01-19 PROCEDURE — 84484 ASSAY OF TROPONIN QUANT: CPT

## 2020-01-19 PROCEDURE — 99223 1ST HOSP IP/OBS HIGH 75: CPT | Mod: AI | Performed by: HOSPITALIST

## 2020-01-19 PROCEDURE — 700117 HCHG RX CONTRAST REV CODE 255: Performed by: EMERGENCY MEDICINE

## 2020-01-19 PROCEDURE — 99285 EMERGENCY DEPT VISIT HI MDM: CPT

## 2020-01-19 PROCEDURE — 83880 ASSAY OF NATRIURETIC PEPTIDE: CPT

## 2020-01-19 PROCEDURE — 85730 THROMBOPLASTIN TIME PARTIAL: CPT

## 2020-01-19 PROCEDURE — 93005 ELECTROCARDIOGRAM TRACING: CPT

## 2020-01-19 PROCEDURE — 700111 HCHG RX REV CODE 636 W/ 250 OVERRIDE (IP): Performed by: HOSPITALIST

## 2020-01-19 PROCEDURE — 85379 FIBRIN DEGRADATION QUANT: CPT

## 2020-01-19 PROCEDURE — 83735 ASSAY OF MAGNESIUM: CPT

## 2020-01-19 PROCEDURE — 80053 COMPREHEN METABOLIC PANEL: CPT

## 2020-01-19 PROCEDURE — 93005 ELECTROCARDIOGRAM TRACING: CPT | Performed by: EMERGENCY MEDICINE

## 2020-01-19 PROCEDURE — 85025 COMPLETE CBC W/AUTO DIFF WBC: CPT

## 2020-01-19 PROCEDURE — 85610 PROTHROMBIN TIME: CPT

## 2020-01-19 PROCEDURE — 71275 CT ANGIOGRAPHY CHEST: CPT

## 2020-01-19 PROCEDURE — 93005 ELECTROCARDIOGRAM TRACING: CPT | Performed by: HOSPITALIST

## 2020-01-19 PROCEDURE — 84443 ASSAY THYROID STIM HORMONE: CPT

## 2020-01-19 PROCEDURE — 770020 HCHG ROOM/CARE - TELE (206)

## 2020-01-19 RX ORDER — BISACODYL 10 MG
10 SUPPOSITORY, RECTAL RECTAL
Status: DISCONTINUED | OUTPATIENT
Start: 2020-01-19 | End: 2020-01-29 | Stop reason: HOSPADM

## 2020-01-19 RX ORDER — FUROSEMIDE 10 MG/ML
20 INJECTION INTRAMUSCULAR; INTRAVENOUS
Status: DISCONTINUED | OUTPATIENT
Start: 2020-01-19 | End: 2020-01-21

## 2020-01-19 RX ORDER — ASPIRIN 81 MG/1
324 TABLET, CHEWABLE ORAL DAILY
Status: DISCONTINUED | OUTPATIENT
Start: 2020-01-20 | End: 2020-01-25

## 2020-01-19 RX ORDER — ASPIRIN 600 MG/1
300 SUPPOSITORY RECTAL DAILY
Status: DISCONTINUED | OUTPATIENT
Start: 2020-01-20 | End: 2020-01-25

## 2020-01-19 RX ORDER — ASPIRIN 325 MG
325 TABLET ORAL DAILY
Status: DISCONTINUED | OUTPATIENT
Start: 2020-01-20 | End: 2020-01-25

## 2020-01-19 RX ORDER — AMOXICILLIN 250 MG
2 CAPSULE ORAL 2 TIMES DAILY
Status: DISCONTINUED | OUTPATIENT
Start: 2020-01-19 | End: 2020-01-29 | Stop reason: HOSPADM

## 2020-01-19 RX ORDER — POLYETHYLENE GLYCOL 3350 17 G/17G
1 POWDER, FOR SOLUTION ORAL
Status: DISCONTINUED | OUTPATIENT
Start: 2020-01-19 | End: 2020-01-29 | Stop reason: HOSPADM

## 2020-01-19 RX ADMIN — FUROSEMIDE 20 MG: 10 INJECTION, SOLUTION INTRAMUSCULAR; INTRAVENOUS at 20:11

## 2020-01-19 RX ADMIN — IOHEXOL 80 ML: 350 INJECTION, SOLUTION INTRAVENOUS at 17:53

## 2020-01-19 ASSESSMENT — LIFESTYLE VARIABLES
EVER FELT BAD OR GUILTY ABOUT YOUR DRINKING: NO
TOTAL SCORE: 0
HAVE PEOPLE ANNOYED YOU BY CRITICIZING YOUR DRINKING: NO
ALCOHOL_USE: YES
ON A TYPICAL DAY WHEN YOU DRINK ALCOHOL HOW MANY DRINKS DO YOU HAVE: 2
DOES PATIENT WANT TO STOP DRINKING: NO
CONSUMPTION TOTAL: NEGATIVE
HAVE YOU EVER FELT YOU SHOULD CUT DOWN ON YOUR DRINKING: NO
TOTAL SCORE: 0
AVERAGE NUMBER OF DAYS PER WEEK YOU HAVE A DRINK CONTAINING ALCOHOL: 2
EVER HAD A DRINK FIRST THING IN THE MORNING TO STEADY YOUR NERVES TO GET RID OF A HANGOVER: NO
TOTAL SCORE: 0
HOW MANY TIMES IN THE PAST YEAR HAVE YOU HAD 5 OR MORE DRINKS IN A DAY: 0

## 2020-01-19 ASSESSMENT — ENCOUNTER SYMPTOMS
HEARTBURN: 0
NECK PAIN: 0
CHILLS: 0
BLURRED VISION: 0
TREMORS: 0
CLAUDICATION: 0
NERVOUS/ANXIOUS: 0
SPUTUM PRODUCTION: 0
DEPRESSION: 0
NAUSEA: 0
SENSORY CHANGE: 0
PND: 0
HEMOPTYSIS: 0
PALPITATIONS: 0
FEVER: 0
EYE PAIN: 0
DIZZINESS: 0
WEAKNESS: 0
HEADACHES: 0
MYALGIAS: 0
MEMORY LOSS: 0
STRIDOR: 0
BLOOD IN STOOL: 0
CONSTIPATION: 0
VOMITING: 0
BACK PAIN: 0
PHOTOPHOBIA: 0
SORE THROAT: 0
SPEECH CHANGE: 0
SHORTNESS OF BREATH: 1
COUGH: 0
ORTHOPNEA: 1
TINGLING: 0
DOUBLE VISION: 0

## 2020-01-19 ASSESSMENT — COGNITIVE AND FUNCTIONAL STATUS - GENERAL
SUGGESTED CMS G CODE MODIFIER DAILY ACTIVITY: CK
CLIMB 3 TO 5 STEPS WITH RAILING: TOTAL
MOVING TO AND FROM BED TO CHAIR: A LITTLE
MOVING FROM LYING ON BACK TO SITTING ON SIDE OF FLAT BED: A LOT
STANDING UP FROM CHAIR USING ARMS: A LOT
MOBILITY SCORE: 14
SUGGESTED CMS G CODE MODIFIER MOBILITY: CL
DRESSING REGULAR UPPER BODY CLOTHING: A LITTLE
DAILY ACTIVITIY SCORE: 18
WALKING IN HOSPITAL ROOM: A LOT
TOILETING: A LITTLE
DRESSING REGULAR LOWER BODY CLOTHING: A LOT
HELP NEEDED FOR BATHING: A LOT

## 2020-01-19 ASSESSMENT — PATIENT HEALTH QUESTIONNAIRE - PHQ9
1. LITTLE INTEREST OR PLEASURE IN DOING THINGS: NOT AT ALL
2. FEELING DOWN, DEPRESSED, IRRITABLE, OR HOPELESS: NOT AT ALL
SUM OF ALL RESPONSES TO PHQ9 QUESTIONS 1 AND 2: 0

## 2020-01-19 ASSESSMENT — PAIN DESCRIPTION - DESCRIPTORS: DESCRIPTORS: PRESSURE

## 2020-01-19 NOTE — ED PROVIDER NOTES
"ED Provider Note    CHIEF COMPLAINT  Chief Complaint   Patient presents with   • Chest Pain     intermittent central CP x \" a few weeks but its getting worse\". 8/10 pain when present   • Shortness of Breath     Pt was in 80's on RA at home.        HPI  Jhon Begum is a 89 y.o. male who presents for chest pain which is like a pressure sensation over the anterior chest occurring most mornings for the last 2 to 3 weeks.  It seems to be worsening.  He reported a severity of 8 out of 10 to paramedics.  Paramedics gave him aspirin.  He has associated shortness of breath but no associated nausea or sweats.  Denies fever has occasional cough.  Denies leg swelling or blood clot history.  Denies coronary artery disease history.  He does have hypertension but denies diabetes dyslipidemia.  He quit tobacco 20 years ago.  He had a history of asthma but rarely uses inhalers.  He has been on oxygen at home mornings and evenings for the last year.  He was hypoxic in the 80s on paramedic arrival at his home.  Latest stress test in May with a perfusion defect in the inferior heart that was partly reversible and partly reversible.  Echo in 2018 demonstrated concentric hypertrophy and an ejection fraction of 49%.  CT pulmonary angiogram negative for PE in May.  Emphysema was present.    REVIEW OF SYSTEMS  Pertinent positives include: Worsening 3 weeks chest pain with dyspnea.  Pertinent negatives include: Abdominal pain, leg swelling, DVT or PE history, heart failure, current tobacco use.  10+ systems reviewed and negative.      PAST MEDICAL HISTORY  Past Medical History:   Diagnosis Date   • ASTHMA    • Congestive heart failure (HCC)    • Emphysema lung (HCC) 2008   • Pain        FAMILY HISTORY  Family History   Problem Relation Age of Onset   • Cancer Other        SOCIAL HISTORY  Social History     Tobacco Use   • Smoking status: Former Smoker     Years: 60.00     Types: Cigarettes     Last attempt to quit: 11/19/2010     Years " "since quittin.1   • Smokeless tobacco: Never Used   • Tobacco comment: 2 packs per week   Substance Use Topics   • Alcohol use: Yes     Comment: 1 DAILY   • Drug use: No     Social History     Substance and Sexual Activity   Drug Use No       SURGICAL HISTORY  Past Surgical History:   Procedure Laterality Date   • HIP ARTHROPLASTY TOTAL  3/31/08    Performed by WILLIE BURNHAM at SURGERY 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      Current Outpatient Medications   Medication Sig Dispense Refill   • furosemide (LASIX) 20 MG Tab Take 20 mg by mouth every day. Indications: Edema     • ARTIFICIAL TEAR SOLUTION OP 2 Drops by Ophthalmic route as needed (redness, dry eye).     • Home Care Oxygen Inhale 3-5 L/min by mouth as needed for Shortness of Breath (Jhon is only using during daylight hours).     • morphine (ROXANOL) 20 MG/ML Solution Take 5 mg by mouth every 2 hours as needed (pain or shortness of breath). Give 0.25ml (5mg) by mouth every 2 hours as needed for pain or shortness of breath     • LORazepam (ATIVAN) 2 MG/ML Conc Take 0.5 mg by mouth every 6 hours as needed (restlessness/agitation). Give 0.25ml (0.5mg) by mouth every 6 hours as needed for restlessness or agitation.     • bisacodyl (BISCOLAX) 10 MG Suppos Insert 10 mg in rectum as needed (bowel movement). Give one suppository rectal as needed for constipation     • hyoscyamine (ANASPAZ, LEVSIN) 0.125 MG tablet Take 125 mcg by mouth every 6 hours as needed for Other (excessive respiratory secretions). Give 1 tablet by mouth every 6 hours as needed for excessive respiratory secretions     • sennosides-docusate sodium (SENOKOT-S) 8.6-50 MG tablet Take 1 Tab by mouth 2 times a day as needed " "(Consitipation). Give one tablet by mouth two times a day for bowel movement     • ipratropium-albuterol (DUONEB) 0.5-2.5 (3) MG/3ML nebulizer solution 3 mL by Nebulization route every 6 hours as needed for Shortness of Breath. Administer 1 treatment via nebulizer every 6 hours as needed for shortness of breath.     • enalapril (VASOTEC) 10 MG Tab Take 10 mg by mouth every day. Indications: Congestive Heart Failure         ALLERGIES  No Known Allergies    PHYSICAL EXAM  VITAL SIGNS: /88   Pulse 82   Temp 37.3 °C (99.1 °F) (Temporal)   Resp 14   Ht 1.753 m (5' 9\")   Wt 60 kg (132 lb 4.4 oz)   SpO2 89%   BMI 19.53 kg/m²   Reviewed and high normal blood pressure, afebrile, hypoxia, on room air  Constitutional: Well developed, Well nourished, well-appearing, appears approximately his age.  HENT: Normocephalic, atraumatic, bilateral external ears normal, oropharynx moist, No exudates or erythema.   Eyes: PERRLA, conjunctiva pink, no scleral icterus.   Cardiovascular: Regular S1-S2 without clear murmur rub or gallop.  No JVD or bruit.  2+ pitting edema bilateral legs.  Respiratory: No rales, rhonchi, wheeze but quiet breath sounds with only fair airflow..  Gastrointestinal: Soft, nontender, nondistended.  Skin: No erythema, no rash.   Genitourinary:  No costovertebral angle tenderness.   Neurologic: Alert & oriented x 3, cranial nerves 2-12 intact by passive exam.  No focal deficit noted.  Psychiatric: Affect normal, Judgment normal, Mood normal.     DIFFERENTIAL DIAGNOSIS:  Myocardial infarction, angina, pneumonia, pneumothorax, PE, pulmonary edema, COPD, chest wall pain, GERD.    EKG  EKG Interpretation 3:42 PM    Interpreted by me.  Indication: Chest pain    Rhythm: normal sinus   Rate: Normal at exte 7  Axis: 9 is 38  Ectopy: none  Conduction: normal  ST/T Waves: Nonspecific lateral ST change  Q Waves: none  R Wave progression: normal  Comparison: ST changes new compared to May    Clinical Impression: " Sinus rhythm with left axis deviation and nonspecific lateral ST change  .    RADIOLOGY/PROCEDURES  CT-CTA CHEST PULMONARY ARTERY W/ RECONS   Final Result      No evidence of pulmonary embolus.      Peripheral interstitial prominence suggesting pulmonary edema.      Small, right greater than left, pleural effusions.                LABORATORY:  Results for orders placed or performed during the hospital encounter of 01/19/20   TROPONIN   Result Value Ref Range    Troponin T 30 (H) 6 - 19 ng/L   CBC WITH DIFFERENTIAL   Result Value Ref Range    WBC 7.5 4.8 - 10.8 K/uL    RBC 3.57 (L) 4.70 - 6.10 M/uL    Hemoglobin 11.7 (L) 14.0 - 18.0 g/dL    Hematocrit 37.4 (L) 42.0 - 52.0 %    .8 (H) 81.4 - 97.8 fL    MCH 32.8 27.0 - 33.0 pg    MCHC 31.3 (L) 33.7 - 35.3 g/dL    RDW 54.1 (H) 35.9 - 50.0 fL    Platelet Count 147 (L) 164 - 446 K/uL    MPV 9.6 9.0 - 12.9 fL    Neutrophils-Polys 63.90 44.00 - 72.00 %    Lymphocytes 22.10 22.00 - 41.00 %    Monocytes 11.80 0.00 - 13.40 %    Eosinophils 0.90 0.00 - 6.90 %    Basophils 0.40 0.00 - 1.80 %    Immature Granulocytes 0.90 0.00 - 0.90 %    Nucleated RBC 0.00 /100 WBC    Neutrophils (Absolute) 4.75 1.82 - 7.42 K/uL    Lymphs (Absolute) 1.65 1.00 - 4.80 K/uL    Monos (Absolute) 0.88 (H) 0.00 - 0.85 K/uL    Eos (Absolute) 0.07 0.00 - 0.51 K/uL    Baso (Absolute) 0.03 0.00 - 0.12 K/uL    Immature Granulocytes (abs) 0.07 0.00 - 0.11 K/uL    NRBC (Absolute) 0.00 K/uL   Comp Metabolic Panel   Result Value Ref Range    Sodium 135 135 - 145 mmol/L    Potassium 4.6 3.6 - 5.5 mmol/L    Chloride 97 96 - 112 mmol/L    Co2 29 20 - 33 mmol/L    Anion Gap 9.0 0.0 - 11.9    Glucose 114 (H) 65 - 99 mg/dL    Bun 25 (H) 8 - 22 mg/dL    Creatinine 1.10 0.50 - 1.40 mg/dL    Calcium 8.6 8.4 - 10.2 mg/dL    AST(SGOT) 23 12 - 45 U/L    ALT(SGPT) 16 2 - 50 U/L    Alkaline Phosphatase 65 30 - 99 U/L    Total Bilirubin 1.1 0.1 - 1.5 mg/dL    Albumin 3.9 3.2 - 4.9 g/dL    Total Protein 6.1 6.0 - 8.2  g/dL    Globulin 2.2 1.9 - 3.5 g/dL    A-G Ratio 1.8 g/dL   TSH   Result Value Ref Range    TSH 1.040 0.380 - 5.330 uIU/mL   D-DIMER   Result Value Ref Range    D-Dimer Screen 1.67 (H) 0.00 - 0.50 ug/mL (FEU)   MAGNESIUM   Result Value Ref Range    Magnesium 2.0 1.5 - 2.5 mg/dL   Prothrombin Time   Result Value Ref Range    PT 15.2 (H) 12.0 - 14.6 sec    INR 1.18 (H) 0.87 - 1.13   APTT   Result Value Ref Range    APTT 27.7 24.7 - 36.0 sec   proBrain Natriuretic Peptide, NT   Result Value Ref Range    NT-proBNP 29966 (H) 0 - 125 pg/mL   ESTIMATED GFR       INTERVENTIONS:  Patient received full dose aspirin with paramedics      COURSE & MEDICAL DECISION MAKING  Well-appearing aged patient presents with chest pain dyspnea and hypoxia.  He has pulmonary edema with a very elevated brain natruretic peptide and a known EF of 49% in 2018.  He has an area of poor perfusion of the heart part of which is reversible he could be having angina.  There is no definite myocardial infarction pneumonia pneumothorax or PE despite elevated d-dimer.  Case discussed with the hospitalist who will admit the patient for serial troponins and stress testing.  He will need diuretics, oxygen, blood pressure control and echocardiogram.    PLAN:  As above    CONDITION: Guarded.    FINAL IMPRESSION  1. Acute coronary syndrome (HCC)    2. Elevated d-dimer    3. Hypoxia    4. Elevated brain natriuretic peptide (BNP) level    5.      Pulmonary edema with effusions      Electronically signed by: Kieran Campbell M.D., 1/19/2020 3:39 PM

## 2020-01-20 ENCOUNTER — APPOINTMENT (OUTPATIENT)
Dept: RADIOLOGY | Facility: MEDICAL CENTER | Age: 85
DRG: 177 | End: 2020-01-20
Attending: INTERNAL MEDICINE
Payer: MEDICARE

## 2020-01-20 PROBLEM — D53.9 MACROCYTIC ANEMIA: Status: ACTIVE | Noted: 2020-01-20

## 2020-01-20 PROBLEM — R13.10 DYSPHAGIA: Status: ACTIVE | Noted: 2020-01-20

## 2020-01-20 LAB
ALBUMIN SERPL BCP-MCNC: 3.7 G/DL (ref 3.2–4.9)
ALBUMIN/GLOB SERPL: 1.7 G/DL
ALP SERPL-CCNC: 61 U/L (ref 30–99)
ALT SERPL-CCNC: 15 U/L (ref 2–50)
ANION GAP SERPL CALC-SCNC: 14 MMOL/L (ref 0–11.9)
AST SERPL-CCNC: 24 U/L (ref 12–45)
BASOPHILS # BLD AUTO: 0.5 % (ref 0–1.8)
BASOPHILS # BLD: 0.03 K/UL (ref 0–0.12)
BILIRUB SERPL-MCNC: 1.1 MG/DL (ref 0.1–1.5)
BUN SERPL-MCNC: 24 MG/DL (ref 8–22)
CALCIUM SERPL-MCNC: 8.6 MG/DL (ref 8.4–10.2)
CHLORIDE SERPL-SCNC: 95 MMOL/L (ref 96–112)
CHOLEST SERPL-MCNC: 154 MG/DL (ref 100–199)
CO2 SERPL-SCNC: 28 MMOL/L (ref 20–33)
CREAT SERPL-MCNC: 1.01 MG/DL (ref 0.5–1.4)
EKG IMPRESSION: NORMAL
EOSINOPHIL # BLD AUTO: 0.14 K/UL (ref 0–0.51)
EOSINOPHIL NFR BLD: 2.4 % (ref 0–6.9)
ERYTHROCYTE [DISTWIDTH] IN BLOOD BY AUTOMATED COUNT: 53.3 FL (ref 35.9–50)
EST. AVERAGE GLUCOSE BLD GHB EST-MCNC: 103 MG/DL
GLOBULIN SER CALC-MCNC: 2.2 G/DL (ref 1.9–3.5)
GLUCOSE SERPL-MCNC: 91 MG/DL (ref 65–99)
HBA1C MFR BLD: 5.2 % (ref 0–5.6)
HCT VFR BLD AUTO: 36 % (ref 42–52)
HDLC SERPL-MCNC: 96 MG/DL
HGB BLD-MCNC: 11.6 G/DL (ref 14–18)
IMM GRANULOCYTES # BLD AUTO: 0.06 K/UL (ref 0–0.11)
IMM GRANULOCYTES NFR BLD AUTO: 1 % (ref 0–0.9)
LDLC SERPL CALC-MCNC: 48 MG/DL
LYMPHOCYTES # BLD AUTO: 1.01 K/UL (ref 1–4.8)
LYMPHOCYTES NFR BLD: 17.5 % (ref 22–41)
MCH RBC QN AUTO: 34 PG (ref 27–33)
MCHC RBC AUTO-ENTMCNC: 32.2 G/DL (ref 33.7–35.3)
MCV RBC AUTO: 105.6 FL (ref 81.4–97.8)
MONOCYTES # BLD AUTO: 0.77 K/UL (ref 0–0.85)
MONOCYTES NFR BLD AUTO: 13.3 % (ref 0–13.4)
NEUTROPHILS # BLD AUTO: 3.77 K/UL (ref 1.82–7.42)
NEUTROPHILS NFR BLD: 65.3 % (ref 44–72)
NRBC # BLD AUTO: 0 K/UL
NRBC BLD-RTO: 0 /100 WBC
NT-PROBNP SERPL IA-MCNC: ABNORMAL PG/ML (ref 0–125)
PLATELET # BLD AUTO: 125 K/UL (ref 164–446)
PMV BLD AUTO: 10.1 FL (ref 9–12.9)
POTASSIUM SERPL-SCNC: 4 MMOL/L (ref 3.6–5.5)
PROT SERPL-MCNC: 5.9 G/DL (ref 6–8.2)
RBC # BLD AUTO: 3.41 M/UL (ref 4.7–6.1)
SODIUM SERPL-SCNC: 137 MMOL/L (ref 135–145)
TRIGL SERPL-MCNC: 49 MG/DL (ref 0–149)
TROPONIN T SERPL-MCNC: 34 NG/L (ref 6–19)
TROPONIN T SERPL-MCNC: 34 NG/L (ref 6–19)
WBC # BLD AUTO: 5.8 K/UL (ref 4.8–10.8)

## 2020-01-20 PROCEDURE — 700102 HCHG RX REV CODE 250 W/ 637 OVERRIDE(OP): Performed by: HOSPITALIST

## 2020-01-20 PROCEDURE — 84484 ASSAY OF TROPONIN QUANT: CPT

## 2020-01-20 PROCEDURE — 80053 COMPREHEN METABOLIC PANEL: CPT

## 2020-01-20 PROCEDURE — 770020 HCHG ROOM/CARE - TELE (206)

## 2020-01-20 PROCEDURE — 74230 X-RAY XM SWLNG FUNCJ C+: CPT

## 2020-01-20 PROCEDURE — 700111 HCHG RX REV CODE 636 W/ 250 OVERRIDE (IP): Performed by: HOSPITALIST

## 2020-01-20 PROCEDURE — 700102 HCHG RX REV CODE 250 W/ 637 OVERRIDE(OP): Performed by: INTERNAL MEDICINE

## 2020-01-20 PROCEDURE — 97165 OT EVAL LOW COMPLEX 30 MIN: CPT

## 2020-01-20 PROCEDURE — 83880 ASSAY OF NATRIURETIC PEPTIDE: CPT

## 2020-01-20 PROCEDURE — 93010 ELECTROCARDIOGRAM REPORT: CPT | Performed by: INTERNAL MEDICINE

## 2020-01-20 PROCEDURE — 92611 MOTION FLUOROSCOPY/SWALLOW: CPT

## 2020-01-20 PROCEDURE — 99232 SBSQ HOSP IP/OBS MODERATE 35: CPT | Performed by: INTERNAL MEDICINE

## 2020-01-20 PROCEDURE — A9270 NON-COVERED ITEM OR SERVICE: HCPCS | Performed by: HOSPITALIST

## 2020-01-20 PROCEDURE — A9270 NON-COVERED ITEM OR SERVICE: HCPCS | Performed by: INTERNAL MEDICINE

## 2020-01-20 PROCEDURE — 80061 LIPID PANEL: CPT

## 2020-01-20 PROCEDURE — 85025 COMPLETE CBC W/AUTO DIFF WBC: CPT

## 2020-01-20 PROCEDURE — 92610 EVALUATE SWALLOWING FUNCTION: CPT

## 2020-01-20 PROCEDURE — 83036 HEMOGLOBIN GLYCOSYLATED A1C: CPT

## 2020-01-20 PROCEDURE — 94762 N-INVAS EAR/PLS OXIMTRY CONT: CPT

## 2020-01-20 PROCEDURE — 97163 PT EVAL HIGH COMPLEX 45 MIN: CPT

## 2020-01-20 RX ORDER — ENALAPRIL MALEATE 5 MG/1
5 TABLET ORAL
Status: DISCONTINUED | OUTPATIENT
Start: 2020-01-20 | End: 2020-01-29 | Stop reason: HOSPADM

## 2020-01-20 RX ADMIN — FUROSEMIDE 20 MG: 10 INJECTION, SOLUTION INTRAMUSCULAR; INTRAVENOUS at 17:17

## 2020-01-20 RX ADMIN — SENNOSIDES AND DOCUSATE SODIUM 2 TABLET: 8.6; 5 TABLET ORAL at 17:17

## 2020-01-20 RX ADMIN — ENALAPRIL MALEATE 5 MG: 5 TABLET ORAL at 08:49

## 2020-01-20 RX ADMIN — ENOXAPARIN SODIUM 40 MG: 100 INJECTION SUBCUTANEOUS at 05:17

## 2020-01-20 RX ADMIN — ASPIRIN 300 MG: 600 SUPPOSITORY RECTAL at 05:20

## 2020-01-20 RX ADMIN — FUROSEMIDE 20 MG: 10 INJECTION, SOLUTION INTRAMUSCULAR; INTRAVENOUS at 05:20

## 2020-01-20 ASSESSMENT — ENCOUNTER SYMPTOMS
CHILLS: 0
NAUSEA: 0
SHORTNESS OF BREATH: 1
DEPRESSION: 0
SORE THROAT: 0
DIZZINESS: 0
WEAKNESS: 1
HEARTBURN: 0
HEADACHES: 0
FEVER: 0
BLOOD IN STOOL: 0
PALPITATIONS: 0
HALLUCINATIONS: 0
ABDOMINAL PAIN: 0
COUGH: 1
MYALGIAS: 0
FOCAL WEAKNESS: 0
NERVOUS/ANXIOUS: 1
VOMITING: 0
BACK PAIN: 0
DIARRHEA: 0

## 2020-01-20 ASSESSMENT — COGNITIVE AND FUNCTIONAL STATUS - GENERAL
MOBILITY SCORE: 14
CLIMB 3 TO 5 STEPS WITH RAILING: TOTAL
MOVING TO AND FROM BED TO CHAIR: A LOT
TURNING FROM BACK TO SIDE WHILE IN FLAT BAD: A LITTLE
STANDING UP FROM CHAIR USING ARMS: A LITTLE
SUGGESTED CMS G CODE MODIFIER MOBILITY: CL
MOVING FROM LYING ON BACK TO SITTING ON SIDE OF FLAT BED: A LITTLE
WALKING IN HOSPITAL ROOM: A LOT

## 2020-01-20 ASSESSMENT — PATIENT HEALTH QUESTIONNAIRE - PHQ9
2. FEELING DOWN, DEPRESSED, IRRITABLE, OR HOPELESS: NOT AT ALL
2. FEELING DOWN, DEPRESSED, IRRITABLE, OR HOPELESS: NOT AT ALL
SUM OF ALL RESPONSES TO PHQ9 QUESTIONS 1 AND 2: 0
1. LITTLE INTEREST OR PLEASURE IN DOING THINGS: NOT AT ALL
1. LITTLE INTEREST OR PLEASURE IN DOING THINGS: NOT AT ALL
SUM OF ALL RESPONSES TO PHQ9 QUESTIONS 1 AND 2: 0

## 2020-01-20 ASSESSMENT — GAIT ASSESSMENTS
DISTANCE (FEET): 5
GAIT LEVEL OF ASSIST: MINIMAL ASSIST
ASSISTIVE DEVICE: FRONT WHEEL WALKER
DEVIATION: STEP TO;DECREASED BASE OF SUPPORT;BRADYKINETIC

## 2020-01-20 ASSESSMENT — ACTIVITIES OF DAILY LIVING (ADL): TOILETING: REQUIRES ASSIST

## 2020-01-20 NOTE — THERAPY
"  Speech Language Therapy [facility] completed.    Functional Status: Pt was seen today for Modified Barium Swallow Study. Pt was seated upright in [facility] chair for today's session. Pt was administered barium thin liquids, puree, and regular solids in Lateral View.     Pt with penetration during the swallow x1 for cup sip of thins; no other aspiration/penetration was appreciated across remainder of today's session (including with serial sips of thins.) Oral dysphagia characterized by delayed a/p transit for puree, prolonged mastication, delayed swallow initiation to the valleculae and trace oral residue after the swallow. Pharyngeal dysphagia characterized by trace vallecular residue. Esophageal phase not evaluated today due to Pt's difficulties standing. Given the aforementioned information, Pt is recommended for PO diet of Regular solids, thin liquids and meds floated in puree. Pt verbalized understanding. RN aware. SLP following to ensure diet tolerance. Thank you for the consult.    Recommendations - Diet: Regular, Thin Liquid                          Strategies: Monitor during meals, Assistance needed for meal tray set-up and Head of Bed at 90 Degrees                          Medication Administration: Float in puree (1x at a time)    Plan of Care: Will benefit from Speech Therapy 3 times per week  Post-Acute Therapy: Anticipate that the patient will have no further speech therapy needs after discharge from the hospital.    See \"Rehab Therapy-Acute\" Patient Summary Report for complete documentation.   "

## 2020-01-20 NOTE — ASSESSMENT & PLAN NOTE
Noted by speech therapy  Modified barium swallow results noted; regular diet with thin liquids per ST

## 2020-01-20 NOTE — PROGRESS NOTES
[facility] Medicine Daily Progress Note    Date of Service  1/20/2020    Chief Complaint  89 y.o. male with a history of hypertension admitted 1/19/2020 with shortness of breath.    Hospital Course    History of hypertension and chronic 2 L oxygen dependent respiratory failure admitted with shortness of breath found to have acute on chronic respiratory failure secondary to pulmonary edema, elevated BNP      Interval Problem Update  1/20: Requiring 4 L of oxygen, had difficulty swallowing with SLP evaluation, modified barium swallow ordered.  Tolerating Lasix, fatigued    Consultants/Specialty  None    Code Status  DNR, intubation okay    Disposition  To be determined pending PT OT    Review of Systems  Review of Systems   Constitutional: Positive for malaise/fatigue. Negative for chills and fever.   HENT: Negative for sore throat.    Respiratory: Positive for cough (After swallowing) and shortness of breath.    Cardiovascular: Negative for chest pain and palpitations.   Gastrointestinal: Negative for abdominal pain, blood in stool, diarrhea, heartburn, nausea and vomiting.   Genitourinary: Negative for dysuria and frequency.   Musculoskeletal: Negative for back pain and myalgias.   Neurological: Positive for weakness. Negative for dizziness, focal weakness and headaches.   Psychiatric/Behavioral: Negative for depression and hallucinations. The patient is nervous/anxious.    All other systems reviewed and are negative.       Physical Exam  Temp:  [36.3 °C (97.3 °F)-37.3 °C (99.1 °F)] 36.3 °C (97.3 °F)  Pulse:  [61-88] 88  Resp:  [14-25] 18  BP: (127-155)/(64-92) 127/66  SpO2:  [89 %-100 %] 90 %    Physical Exam  Constitutional:       Appearance: Normal appearance.      Comments: Thin   HENT:      Head: Normocephalic and atraumatic.      Nose: Nose normal.      Mouth/Throat:      Mouth: Mucous membranes are moist.   Eyes:      Extraocular Movements: Extraocular movements intact.      Pupils: Pupils are equal, round, and  reactive to light.   Neck:      Musculoskeletal: Normal range of motion and neck supple.   Cardiovascular:      Rate and Rhythm: Normal rate and regular rhythm.      Heart sounds: No murmur.   Pulmonary:      Effort: Pulmonary effort is normal. No respiratory distress.      Breath sounds: No stridor.      Comments: Decreased air movement throughout  Abdominal:      General: Abdomen is flat. Bowel sounds are normal. There is no distension.      Palpations: Abdomen is soft.      Tenderness: There is no tenderness.   Musculoskeletal:         General: No swelling, tenderness or deformity.      Comments: Thoracic kyphosis   Skin:     General: Skin is warm and dry.      Coloration: Skin is not pale.   Neurological:      General: No focal deficit present.      Mental Status: He is alert and oriented to person, place, and time. Mental status is at baseline.   Psychiatric:         Mood and Affect: Mood normal.         Behavior: Behavior normal.         Thought Content: Thought content normal.         Fluids    Intake/Output Summary (Last 24 hours) at 1/20/2020 1429  Last data filed at 1/20/2020 0755  Gross per 24 hour   Intake 0 ml   Output 975 ml   Net -975 ml       Laboratory  Recent Labs     01/19/20  1520 01/20/20  0358   WBC 7.5 5.8   RBC 3.57* 3.41*   HEMOGLOBIN 11.7* 11.6*   HEMATOCRIT 37.4* 36.0*   .8* 105.6*   MCH 32.8 34.0*   MCHC 31.3* 32.2*   RDW 54.1* 53.3*   PLATELETCT 147* 125*   MPV 9.6 10.1     Recent Labs     01/19/20  1520 01/20/20  0358   SODIUM 135 137   POTASSIUM 4.6 4.0   CHLORIDE 97 95*   CO2 29 28   GLUCOSE 114* 91   BUN 25* 24*   CREATININE 1.10 1.01   CALCIUM 8.6 8.6     Recent Labs     01/19/20  1520   APTT 27.7   INR 1.18*         Recent Labs     01/20/20  0906   TRIGLYCERIDE 49   HDL 96   LDL 48       Imaging  CT-CTA CHEST PULMONARY ARTERY W/ RECONS   Final Result      No evidence of pulmonary embolus.      Peripheral interstitial prominence suggesting pulmonary edema.      Small, right  greater than left, pleural effusions.            DX-ESOPHAGUS - [id number]    (Results Pending)   DX-ESOPHAGUS - [id number]    (Results Pending)        Assessment/Plan  * Acute exacerbation of CHF (congestive heart failure) ([facility])- (present on admission)  Assessment & Plan  No hx of CHF in the past, last echo was normal, very elevated BNP and pulmonary edema on x-ray  CTA PE neg for PE but shows pleural effusion and pulmonary edema.  IV lasix 20 twice daily  resume lisinopril 5 mg daily to allow room for  Strict I/Os  Echocardiogram repeat ordered.      Acute on chronic respiratory failure with hypoxia (HCC)- (present on admission)  Assessment & Plan  Secondary to acute CHF, unknown if systolic or diastolic as he has no history of CHF.  He may have elevated right heart pressures due to longstanding COPD, he needs an outpatient sleep study  Lasix  Wean oxygen as able  I wonder about an aspiration component, he did not do well with his swallow evaluation  Modified diet    Dysphagia  Assessment & Plan  Noted by speech therapy  Modified diet for now  Modified barium swallow    Macrocytic anemia  Assessment & Plan  He denies blood loss  Check iron studies    Pain in the chest  Assessment & Plan  EKG unimpressive for acute ischemia  troponin's mildly elevated at 30, continue to trend.  Follow-up echocardiogram  Aspirin    COPD (chronic obstructive pulmonary disease) ([facility])- (present on admission)  Assessment & Plan  Baseline 2L at home  At this time no evidence of exacerbation  Continue RT protocol, duo nebs, Pep therapy if warranted, and incentive spirometry.       HTN (hypertension)- (present on admission)  Assessment & Plan  Resume home dose of enalapril          VTE prophylaxis: Lovenox

## 2020-01-20 NOTE — CARE PLAN
Problem: Communication  Goal: The ability to communicate needs accurately and effectively will improve  Outcome: PROGRESSING AS EXPECTED  Note:   Pt will feel comfortable communicating questions and concerns with treatment team. Will continue to foster this relationship. Discussed POC and meds.        Problem: Safety  Goal: Will remain free from injury  Outcome: PROGRESSING AS EXPECTED  Note:   Bed alarm on. Call light and personal belongings within reach.

## 2020-01-20 NOTE — ASSESSMENT & PLAN NOTE
Secondary to acute CHF,new onset. Diuresing better with increased lasix dose; lasix held due to volume contraction; CHF is compensated now  Echo showed 55% EF, grade 2 diastolic dysfunction and mild AI/MR  He has weaned to baseline 2L oxygen  Needs new O2 order for home  Initially enrolled patient with hospice few days ago but changed his mind 1/25  Home soon with  vs SNF

## 2020-01-20 NOTE — RESPIRATORY CARE
COPD EDUCATION by COPD CLINICAL EDUCATOR  1/20/2020 at 9:00 AM by Radha Tao RRT     Patient reviewed by COPD education team. Patient does not have a history or diagnosis of COPD and is a former smoker, on service with [facility], therefore does not qualify for the COPD program.

## 2020-01-20 NOTE — PROGRESS NOTES
Telemetry Shift Summary    Rhythm SR  HR Range 61-76  Ectopy rPVC  Measurements 0.14/0.08/0.38    Per Leisa MT    Normal Values  Rhythm SR  HR Range    Measurements 0.12-0.20 / 0.06-0.10  / 0.30-0.52

## 2020-01-20 NOTE — THERAPY
"Speech Language Therapy Clinical Swallow Evaluation completed.    Functional Status: Pt was seen today for CSE. Pt was repositioned fully upright in bed for today's session. Pt was AAOx4 and confirmed name/. Pt initially denied hx of dysphagia, PNA and/or GERD; however, later reported hx of choking on food. Pt eats a Regular diet at baseline. Oral mech exam revealed reduced lingual strength, reduced labial strength and reduced laryngeal elevation per palpation. Pt was agreeable to PO trials.     Pt was provided with PO trials of ice chips, thins (tsps, cup sips), puree, pudding, and soft/mixed solids. Pt demonstrated no overt clinical s/sx of aspiration/penetration with ice, thins, puree and/or puddings; however, demonstrated strong, overt coughing before the swallow for small bite of pears which is concerning for possible aspiration/penetration. Pt endorsed choking, and was observed to turn bright red, requiring >30-sec to recover. Pt demonstrated intermittent desatting to high 70's, low 80's which is also concerning for possible aspiration/penetration. PO trials were discontinued at this time. Recommend instrumental swallow evaluation to further characterize oropharyngeal swallow function. Pt agreeable. RN and MD aware. MBSS (modified barium swallow study) scheduled for 1:30pm today. Recommend Pt to remain NPO at this time. SLP following. Thank you for the consult.    Recommendations - Diet: NPO, Pre-Feeding Trials with SLP Only                          Strategies: To Be Assessed                          Medication Administration: Crushed in puree    Plan of Care: Will benefit from Speech Therapy 3 times per week  Post-Acute Therapy: Recommend inpatient transitional care services for continued speech therapy services.      See \"Rehab Therapy-Acute\" Patient Summary Report for complete documentation.   "

## 2020-01-20 NOTE — THERAPY
"Occupational Therapy Evaluation completed.   Functional Status: Pt presents in bed, Ox1-2. Confusion noted. A bit impulsive. [place]. O2@4L NC. Encouragement needed for OOB. Performs Sup to sit with Wes v/cy'cs. Declined grooming. Socks donned with Wes. STS with WesFWW. Takes 6 steps with Wes. ADL transfer with VERONIKA Dave. Tolerates UIC. Family in room.        Plan of Care: Will benefit from Occupational Therapy 2 times per week  Discharge Recommendations:  Equipment: Will Continue to Assess for Equipment Needs. Post-acute therapy Discharge to a transitional care facility for continued skilled therapy services. Lives with son. Rec 24/7Sup.   See \"Rehab Therapy-Acute\" Patient Summary Report for complete documentation.    "

## 2020-01-20 NOTE — ASSESSMENT & PLAN NOTE
EKG unimpressive for acute ischemia  troponin's mildly elevated at 30, not rising.  No chest pain at this time.  Follow-up echocardiogram outpatient  Aspirin

## 2020-01-20 NOTE — CARE PLAN
Pt is being evaluated by speech, pt and ot to get a baseline on pts ability to care of oneself. Pt is still SOB, pt will continue to receive lasix!

## 2020-01-20 NOTE — ED TRIAGE NOTES
"Chief Complaint   Patient presents with   • Chest Pain     intermittent central CP x \" a few weeks but its getting worse\". 8/10 pain when present   • Shortness of Breath     Pt was in 80's on RA at home.    REMSA gave pt 324 ASA, 0.8 mg nitro PTA.   " Type of surgery: LAPAROSCOPIC CHOLECYSTECTOMY WITH CHOLANGIOGRAMS   Location of surgery: [facility]  Date and time of surgery: 12/27/2018 @ 1:05 PM   Surgeon: EM NEWTON MD    Pre-Op Appt Date: DONE ER 11/25/2018    Post-Op Appt Date: PATIENT TO SCHEDULE     Packet sent out:   PACKET AND SOAP GIVEN TO PATIENT    Pre-cert/Authorization completed:  Not Applicable  Date: 11/26/2018     LAPAROSCOPIC CHOLECYSTECTOMY WITH CHOLANGIOGRAMS    GENERAL H&P DONE ER 11/25/2018 75 MIN REQ PA ASSIST EGG NMS

## 2020-01-20 NOTE — ASSESSMENT & PLAN NOTE
Baseline 2L at home, quit smoking 20 years ago  At this time no evidence of exacerbation  Continue RT protocol.

## 2020-01-20 NOTE — ASSESSMENT & PLAN NOTE
No hx of CHF in the past, echo noted, very elevated BNP and pulmonary edema on x-ray  Echo showed 55% EF with diastolic dysfunction grade 2  CTA PE neg for PE but shows pleural effusion and pulmonary edema.  Responded well to iv lasix; compensated now, and lasix on hold  Strict I/Os; daily weights

## 2020-01-20 NOTE — ED NOTES
Med rec updated and complete  Allergies reviewed  Pt is not sure what medications he is taking.  Pt reports that he lives alone and does his own medications.  Pt reports that he has not been using his DUONEB for the last 2 weeks.  Called [facility] @ [phone], per [facility] has pt only picking up ENALAPRIL 10MG on 1/9/2020.  FUROSEMIDE 20MG last picked up on 10/29/2019 for 90 day supply, POTASSIUM 20MEQ last picked up on 9/23/2019 for 90 day supply, CARVEDILOL 6.25MG on 11/2/2019 for 30 day supply.  Pt asked for me to call his son (Moi).  Called pts son (Moi) @ [phone] to verify all prescription, per son reports that he was just taken off Hospice about a month ago.  Pts son knows that his doctor took him off his FUROSEMIDE 20MG about a month ago, but is not sure what medications pt his taking or when he took them last, that he only picks up his medications at [facility].   Pts pharmacy and son (Moi) reports no antibiotics in the last 2 weeks

## 2020-01-20 NOTE — H&P
"  Hospital Medicine History & Physical Note    Date of Service  1/19/2020    Primary Care Physician  Ihsan Monet M.D.    Consultants  None    Code Status  DNAR/DNI  Was previous on hospice. Need to have family discussion about this.    Chief Complaint  Chief Complaint   Patient presents with   • Chest Pain     intermittent central CP x \" a few weeks but its getting worse\". 8/10 pain when present   • Shortness of Breath     Pt was in 80's on RA at home.        History of Presenting Illness  Kel is a very pleasant 89 y.o. male with a past medical history of COPD, baseline on 2 L of oxygen, he says he uses it when he needs it,  Hx of HTN presented to the emergency room on 1/19/2020 chest pain shortness of breath which started several weeks ago and has been progressively worsening in terms of duration and intensity.  In terms of his chest pain patient says that discomfort is located around his left side of his chest, 8 out of 10 in severity, pressure-like sensation exacerbated by exertion and relieved by rest.  He also says he is short of breath and becomes more short of breath especially during exertion or ambulation.  Patient said last year he had a similar feeling at that time he underwent several diagnostics including a CT PE study which was negative, and an echocardiogram which showed a preserved LVEF function.  At this point patient denies having any active chest pain.    Review of Systems  Review of Systems   Constitutional: Positive for malaise/fatigue. Negative for chills and fever.   HENT: Negative for congestion, hearing loss, sore throat and tinnitus.    Eyes: Negative for blurred vision, double vision, photophobia and pain.   Respiratory: Positive for shortness of breath. Negative for cough, hemoptysis, sputum production and stridor.    Cardiovascular: Positive for chest pain and orthopnea. Negative for palpitations, claudication and PND.   Gastrointestinal: Negative for blood in stool, " constipation, heartburn, melena, nausea and vomiting.   Genitourinary: Negative for dysuria, frequency and urgency.   Musculoskeletal: Negative for back pain, myalgias and neck pain.   Neurological: Negative for dizziness, tingling, tremors, sensory change, speech change, weakness and headaches.   Psychiatric/Behavioral: Negative for depression, memory loss and suicidal ideas. The patient is not nervous/anxious.    All other systems reviewed and are negative.      Past Medical History  Past Medical History:   Diagnosis Date   • Emphysema lung (HCC) 2008   • ASTHMA    • Congestive heart failure (HCC)    • Pain        Surgical History   has a past surgical history that includes other (1969); other (1969); hip arthroplasty total (3/31/08); other (1969); and other abdominal surgery.    Family History  family history includes Cancer in an other family member.      Social History   reports that he quit smoking about 9 years ago. His smoking use included cigarettes. He smoked 0.00 packs per day for 60.00 years. He has never used smokeless tobacco. He reports current alcohol use. He reports that he does not use drugs.    Allergies  No Known Allergies    Medications  Prior to Admission medications    Medication Sig Start Date End Date Taking? Authorizing Provider   enalapril (VASOTEC) 10 MG Tab Take 10 mg by mouth every day. Indications: Congestive Heart Failure    Physician Outpatient       Physical Exam  Temp:  [37.3 °C (99.1 °F)] 37.3 °C (99.1 °F)  Pulse:  [61-82] 82  Resp:  [14-25] 14  BP: (134-149)/(75-89) 137/88  SpO2:  [89 %-100 %] 89 %  Physical Exam   Constitutional: He is oriented to person, place, and time. He appears well-developed and well-nourished. No distress.   HENT:   Head: Normocephalic and atraumatic.   Mouth/Throat: No oropharyngeal exudate.   Eyes: Pupils are equal, round, and reactive to light. Conjunctivae are normal. Right eye exhibits no discharge. No scleral icterus.   Neck: Neck supple. No JVD  present. No thyromegaly present.   Cardiovascular: Intact distal pulses.   No murmur heard.  Pulses:       Dorsalis pedis pulses are 2+ on the right side and 2+ on the left side.   Cap refill < 3 s   Pulmonary/Chest: Effort normal. No stridor. No respiratory distress. He has no wheezes. He has rales (bibasilar rales).   Abdominal: Soft. Bowel sounds are normal. He exhibits no distension. There is no tenderness. There is no rebound.   Musculoskeletal: Normal range of motion.         General: No edema.   Neurological: He is alert and oriented to person, place, and time. No cranial nerve deficit.   Skin: Skin is warm and dry. He is not diaphoretic. No erythema.   Psychiatric: He has a normal mood and affect. His behavior is normal. Thought content normal.   Nursing note and vitals reviewed.      Laboratory:  Recent Labs     01/19/20  1520   WBC 7.5   RBC 3.57*   HEMOGLOBIN 11.7*   HEMATOCRIT 37.4*   .8*   MCH 32.8   MCHC 31.3*   RDW 54.1*   PLATELETCT 147*   MPV 9.6     Recent Labs     01/19/20  1520   SODIUM 135   POTASSIUM 4.6   CHLORIDE 97   CO2 29   GLUCOSE 114*   BUN 25*   CREATININE 1.10   CALCIUM 8.6     Recent Labs     01/19/20  1520   ALTSGPT 16   ASTSGOT 23   ALKPHOSPHAT 65   TBILIRUBIN 1.1   GLUCOSE 114*     Recent Labs     01/19/20  1520   APTT 27.7   INR 1.18*           Urinalysis:          Imaging:  CT-CTA CHEST PULMONARY ARTERY W/ RECONS   Final Result      No evidence of pulmonary embolus.      Peripheral interstitial prominence suggesting pulmonary edema.      Small, right greater than left, pleural effusions.                Assessment/Plan:  I anticipate this patient will require at least two midnights for appropriate medical management, necessitating inpatient admission.    * Acute exacerbation of CHF (congestive heart failure) (HCC)- (present on admission)  Assessment & Plan  No hx of CHF in the past, last echo was normal.  Suspect as patient has elevated BNP,   CTA PE neg for PE but shows  pleural effusion and pulmonary edema.  IV lasix initiated  Strict I/Os  Echocardiogram repeat ordered.      Pain in the chest  Assessment & Plan  EKG unimpressive for acute ischemia  troponin's mildly elevated at 30, continue to trend.  We will awaitng for echocardiogram.    COPD (chronic obstructive pulmonary disease) (HCC)- (present on admission)  Assessment & Plan  Baseline 2L at home, I do Not suspect this is COPD related.  Continue RT protocol, duo nebs, Pep therapy if warranted, and incentive spirometry.       HTN (hypertension)- (present on admission)  Assessment & Plan  Resume home dose of enalapril       VTE prophylaxis: Prophylaxis: lovenox

## 2020-01-20 NOTE — THERAPY
"Physical Therapy Evaluation completed.   Bed Mobility:  Supine to Sit: Moderate Assist  Transfers: Sit to Stand: Minimal Assist  Gait: Level Of Assist: Minimal Assist with Front-Wheel Walker       Plan of Care: Will benefit from Physical Therapy 3 times per week  Discharge Recommendations: Equipment: Front-Wheel Walker. Recommend post-acute placement for continued physical therapy services prior to discharge home. Patient can tolerate post-acute therapies at a 5x/week frequency.       See \"Rehab Therapy-Acute\" Patient Summary Report for complete documentation.     Pt was recently admitted for chest pain and SOB with acute excacerbation of CHF. Pt presented to PT with impaired balance, impaired gait, weakness, and dec activity tolernace. These impairements are affecting the patients ablility to safely perfrom functional mobility to return home safely. Pt was able to demosntrate Min to Mod A for all functional mobility with FWW use. Pt was primarily limited by fatigue, SOB, and poor activity tolerance. Pt requires frequent seated rest breaks and cues for deep breathing. Pt was only able to tolerate 5 steps towards HOB. Pt appears to be functionally capable of ambulating short distances, however, due to fatigue and SOB pt is only able to tolerate a few steps at this time. Pt will continue to benefit from skilled PT while in house, with recommendation for post acute therapy services at this time. If pt progresses with functional mobility and activity tolerance while in house, pt will may be able to d/c home with son support and  therapy services.   "

## 2020-01-21 LAB
ALBUMIN SERPL BCP-MCNC: 3.6 G/DL (ref 3.2–4.9)
BASOPHILS # BLD AUTO: 0.3 % (ref 0–1.8)
BASOPHILS # BLD: 0.02 K/UL (ref 0–0.12)
BUN SERPL-MCNC: 24 MG/DL (ref 8–22)
CALCIUM SERPL-MCNC: 8.8 MG/DL (ref 8.4–10.2)
CHLORIDE SERPL-SCNC: 93 MMOL/L (ref 96–112)
CO2 SERPL-SCNC: 30 MMOL/L (ref 20–33)
CREAT SERPL-MCNC: 1.03 MG/DL (ref 0.5–1.4)
EOSINOPHIL # BLD AUTO: 0.22 K/UL (ref 0–0.51)
EOSINOPHIL NFR BLD: 3.3 % (ref 0–6.9)
ERYTHROCYTE [DISTWIDTH] IN BLOOD BY AUTOMATED COUNT: 52.6 FL (ref 35.9–50)
GLUCOSE SERPL-MCNC: 84 MG/DL (ref 65–99)
HCT VFR BLD AUTO: 35.7 % (ref 42–52)
HGB BLD-MCNC: 11.4 G/DL (ref 14–18)
IMM GRANULOCYTES # BLD AUTO: 0.05 K/UL (ref 0–0.11)
IMM GRANULOCYTES NFR BLD AUTO: 0.7 % (ref 0–0.9)
LYMPHOCYTES # BLD AUTO: 1.13 K/UL (ref 1–4.8)
LYMPHOCYTES NFR BLD: 16.7 % (ref 22–41)
MAGNESIUM SERPL-MCNC: 2 MG/DL (ref 1.5–2.5)
MCH RBC QN AUTO: 33.6 PG (ref 27–33)
MCHC RBC AUTO-ENTMCNC: 31.9 G/DL (ref 33.7–35.3)
MCV RBC AUTO: 105.3 FL (ref 81.4–97.8)
MONOCYTES # BLD AUTO: 0.81 K/UL (ref 0–0.85)
MONOCYTES NFR BLD AUTO: 12 % (ref 0–13.4)
NEUTROPHILS # BLD AUTO: 4.53 K/UL (ref 1.82–7.42)
NEUTROPHILS NFR BLD: 67 % (ref 44–72)
NRBC # BLD AUTO: 0 K/UL
NRBC BLD-RTO: 0 /100 WBC
PHOSPHATE SERPL-MCNC: 4.8 MG/DL (ref 2.5–4.5)
PLATELET # BLD AUTO: 134 K/UL (ref 164–446)
PMV BLD AUTO: 9.8 FL (ref 9–12.9)
POTASSIUM SERPL-SCNC: 4.1 MMOL/L (ref 3.6–5.5)
RBC # BLD AUTO: 3.39 M/UL (ref 4.7–6.1)
SODIUM SERPL-SCNC: 138 MMOL/L (ref 135–145)
WBC # BLD AUTO: 6.8 K/UL (ref 4.8–10.8)

## 2020-01-21 PROCEDURE — 700111 HCHG RX REV CODE 636 W/ 250 OVERRIDE (IP): Performed by: INTERNAL MEDICINE

## 2020-01-21 PROCEDURE — 99232 SBSQ HOSP IP/OBS MODERATE 35: CPT | Performed by: INTERNAL MEDICINE

## 2020-01-21 PROCEDURE — A9270 NON-COVERED ITEM OR SERVICE: HCPCS | Performed by: HOSPITALIST

## 2020-01-21 PROCEDURE — 80069 RENAL FUNCTION PANEL: CPT

## 2020-01-21 PROCEDURE — 83735 ASSAY OF MAGNESIUM: CPT

## 2020-01-21 PROCEDURE — 700102 HCHG RX REV CODE 250 W/ 637 OVERRIDE(OP): Performed by: HOSPITALIST

## 2020-01-21 PROCEDURE — 700102 HCHG RX REV CODE 250 W/ 637 OVERRIDE(OP): Performed by: INTERNAL MEDICINE

## 2020-01-21 PROCEDURE — A9270 NON-COVERED ITEM OR SERVICE: HCPCS | Performed by: INTERNAL MEDICINE

## 2020-01-21 PROCEDURE — 85025 COMPLETE CBC W/AUTO DIFF WBC: CPT

## 2020-01-21 PROCEDURE — 700111 HCHG RX REV CODE 636 W/ 250 OVERRIDE (IP): Performed by: HOSPITALIST

## 2020-01-21 PROCEDURE — 770020 HCHG ROOM/CARE - TELE (206)

## 2020-01-21 RX ORDER — FUROSEMIDE 10 MG/ML
40 INJECTION INTRAMUSCULAR; INTRAVENOUS
Status: DISCONTINUED | OUTPATIENT
Start: 2020-01-21 | End: 2020-01-23

## 2020-01-21 RX ADMIN — FUROSEMIDE 20 MG: 10 INJECTION, SOLUTION INTRAMUSCULAR; INTRAVENOUS at 06:17

## 2020-01-21 RX ADMIN — SENNOSIDES AND DOCUSATE SODIUM 2 TABLET: 8.6; 5 TABLET ORAL at 17:22

## 2020-01-21 RX ADMIN — ENALAPRIL MALEATE 5 MG: 5 TABLET ORAL at 06:18

## 2020-01-21 RX ADMIN — ASPIRIN 325 MG ORAL TABLET 325 MG: 325 PILL ORAL at 06:17

## 2020-01-21 RX ADMIN — ENOXAPARIN SODIUM 40 MG: 100 INJECTION SUBCUTANEOUS at 06:17

## 2020-01-21 RX ADMIN — SENNOSIDES AND DOCUSATE SODIUM 2 TABLET: 8.6; 5 TABLET ORAL at 06:17

## 2020-01-21 RX ADMIN — FUROSEMIDE 40 MG: 10 INJECTION, SOLUTION INTRAMUSCULAR; INTRAVENOUS at 17:22

## 2020-01-21 ASSESSMENT — PATIENT HEALTH QUESTIONNAIRE - PHQ9
SUM OF ALL RESPONSES TO PHQ9 QUESTIONS 1 AND 2: 0
1. LITTLE INTEREST OR PLEASURE IN DOING THINGS: NOT AT ALL
2. FEELING DOWN, DEPRESSED, IRRITABLE, OR HOPELESS: NOT AT ALL
SUM OF ALL RESPONSES TO PHQ9 QUESTIONS 1 AND 2: 0
1. LITTLE INTEREST OR PLEASURE IN DOING THINGS: NOT AT ALL
2. FEELING DOWN, DEPRESSED, IRRITABLE, OR HOPELESS: NOT AT ALL

## 2020-01-21 ASSESSMENT — ENCOUNTER SYMPTOMS
HALLUCINATIONS: 0
MYALGIAS: 0
COUGH: 1
VOMITING: 0
HEARTBURN: 0
SORE THROAT: 0
PALPITATIONS: 0
FEVER: 0
DEPRESSION: 0
DIZZINESS: 0
WEAKNESS: 1
BACK PAIN: 0
DIARRHEA: 0
NAUSEA: 0
NERVOUS/ANXIOUS: 1
SHORTNESS OF BREATH: 1
ABDOMINAL PAIN: 0
CHILLS: 0
HEADACHES: 0
BLOOD IN STOOL: 0
FOCAL WEAKNESS: 0

## 2020-01-21 ASSESSMENT — PAIN SCALES - WONG BAKER
WONGBAKER_NUMERICALRESPONSE: DOESN'T HURT AT ALL

## 2020-01-21 NOTE — PROGRESS NOTES
Telemetry Shift Summary     Rhythm SR  HR Range 60-75  Ectopy R PVCs, PACs R coup trig  Measurements 0.16/0.08/0.36       Normal Values  Rhythm SR  HR Range    Measurements 0.12-0.20 / 0.06-0.10  / 0.30-0.52    Ongoing care. No acute issues over night. Will continue care and monitoring

## 2020-01-21 NOTE — PROGRESS NOTES
12 hr CC  Monitor Summary  SB-SR 59-80s  .14/.10/.36  Split second increase to 160s     Problem: Patient Care Overview  Goal: Plan of Care Review  POC reviewed with pt and spouce. Pt AAOx4 and following commands. Pt on regular diet, tolerating well. Pt on argatroban drip at 1 mcg/kg/min, titrated per protocol. Pt -30 ml/hr throughout shift. Pt tolerating sitting in chair for 3 1/2 hrs during shift. VSS. Skin free from new breakdown. Family to remain at bedside. Will continue to monitor.

## 2020-01-21 NOTE — DISCHARGE PLANNING
"LSW spoke with son Moi regarding POC for pt. Moi stated that pt was on service with [facility] but is no longer on hospice care. Moi stated that pt was doing well but appears to have declined. Moi concerned that if pt goes home with Moi's brother and hospice he wont receive enough care. Moi wanted to know if there was \"elevated hospice\" where he could get more attention. LSW stated that hospice typically comes around 3-4x a week. Moi and KATEYW also discussed SNF as an option for pt to receive additional PT/OT until he returns home or family helps place pt in a senior care/GH. Moi stated they can not afford an [place]. LSW informed Moi that Medicare will only pay for SNF or hospice but not both. Moi is unsure at this time which direction the pt wants to go and would like to wait to see what the MD says. KATEYW also discussed pt having a private care giver a few times a week to assist with pt and son alongside hospice care. MELYSSA and Moi discussed a few private care giver options. KATEYW stated she would follow up with MD during IDT rounds to discuss pts POC.   "

## 2020-01-21 NOTE — CARE PLAN
Problem: Communication  Goal: The ability to communicate needs accurately and effectively will improve  Outcome: PROGRESSING AS EXPECTED     Problem: Safety  Goal: Will remain free from injury  Outcome: PROGRESSING AS EXPECTED    Ongoing care. No acute issues at this time. Will continue care and monitoring as ordered. Patient is resting with no c/o anything at this time. Patient to be evaluated with Palative care tomorrow.

## 2020-01-21 NOTE — FLOWSHEET NOTE
01/20/20 2100   Oximetry   #Nocturnal Oximetry Study Yes   Continuous Oximetry Yes   O2 Alarms Set & Reviewed Yes   Oxygen   Home O2 Use Prior To Admission? No   Pulse Oximetry 93 %   O2 (LPM) 3.5   O2 Daily Delivery Respiratory  Silicone Nasal Cannula

## 2020-01-21 NOTE — CARE PLAN
Problem: Safety  Goal: Will remain free from injury  Outcome: PROGRESSING AS EXPECTED  Check on Pt hourly. Pt encouraged to call for assistance as needed. Bed in low position, upper side rails up, anti slippery socks on, call light within reach, bed alarm on.         Problem: Respiratory:  Goal: Respiratory status will improve  Outcome: PROGRESSING AS EXPECTED  Assess respiratory status. Encouraged deep breathing and coughing. Encouraged and assisted with use of Incentive Spirometer. Administer medications as scheduled. And PRN breathing tx as requested. Monitor O2 Sat and administer supplemental O2 as needed to keep sats >90%      Problem: Skin Integrity  Goal: Risk for impaired skin integrity will decrease  Outcome: PROGRESSING AS EXPECTED  Assess and monitor skin integrity frequently. Monitor skin rashes and abrasions, color and temperature, excessive dryness and moistness, areas of redness and breakdown. Implement precautions to protect skin. Monitor nutritional status. Assist Pt with repositioning and turn q2hrs. Use pillows to support position and/or elbows and heel protectors. Use draw sheets when repositioning Pt. Place waffle mattress.

## 2020-01-21 NOTE — RESPIRATORY CARE
Sleep-oximetry study performed per protocol.  Room air SAO2 decreased to 83% for 15 minutes.    Titrate nasal cannula up to 3.5 LPM, SAO2 improved to 93%.

## 2020-01-22 ENCOUNTER — APPOINTMENT (OUTPATIENT)
Dept: CARDIOLOGY | Facility: MEDICAL CENTER | Age: 85
DRG: 177 | End: 2020-01-22
Attending: INTERNAL MEDICINE
Payer: MEDICARE

## 2020-01-22 LAB
ANION GAP SERPL CALC-SCNC: 12 MMOL/L (ref 0–11.9)
BASOPHILS # BLD AUTO: 0.3 % (ref 0–1.8)
BASOPHILS # BLD: 0.02 K/UL (ref 0–0.12)
BUN SERPL-MCNC: 21 MG/DL (ref 8–22)
CALCIUM SERPL-MCNC: 8.6 MG/DL (ref 8.4–10.2)
CHLORIDE SERPL-SCNC: 94 MMOL/L (ref 96–112)
CO2 SERPL-SCNC: 34 MMOL/L (ref 20–33)
CREAT SERPL-MCNC: 0.92 MG/DL (ref 0.5–1.4)
EOSINOPHIL # BLD AUTO: 0.14 K/UL (ref 0–0.51)
EOSINOPHIL NFR BLD: 1.9 % (ref 0–6.9)
ERYTHROCYTE [DISTWIDTH] IN BLOOD BY AUTOMATED COUNT: 52.1 FL (ref 35.9–50)
GLUCOSE SERPL-MCNC: 88 MG/DL (ref 65–99)
HCT VFR BLD AUTO: 38.9 % (ref 42–52)
HGB BLD-MCNC: 12.2 G/DL (ref 14–18)
IMM GRANULOCYTES # BLD AUTO: 0.04 K/UL (ref 0–0.11)
IMM GRANULOCYTES NFR BLD AUTO: 0.5 % (ref 0–0.9)
LV EJECT FRACT  99904: 55
LV EJECT FRACT MOD 2C 99903: 54.39
LV EJECT FRACT MOD 4C 99902: 53.49
LV EJECT FRACT MOD BP 99901: 54.69
LYMPHOCYTES # BLD AUTO: 1.29 K/UL (ref 1–4.8)
LYMPHOCYTES NFR BLD: 17.3 % (ref 22–41)
MCH RBC QN AUTO: 33.2 PG (ref 27–33)
MCHC RBC AUTO-ENTMCNC: 31.4 G/DL (ref 33.7–35.3)
MCV RBC AUTO: 106 FL (ref 81.4–97.8)
MONOCYTES # BLD AUTO: 1.09 K/UL (ref 0–0.85)
MONOCYTES NFR BLD AUTO: 14.7 % (ref 0–13.4)
NEUTROPHILS # BLD AUTO: 4.86 K/UL (ref 1.82–7.42)
NEUTROPHILS NFR BLD: 65.3 % (ref 44–72)
NRBC # BLD AUTO: 0 K/UL
NRBC BLD-RTO: 0 /100 WBC
PLATELET # BLD AUTO: 123 K/UL (ref 164–446)
PMV BLD AUTO: 9.6 FL (ref 9–12.9)
POTASSIUM SERPL-SCNC: 3.6 MMOL/L (ref 3.6–5.5)
RBC # BLD AUTO: 3.67 M/UL (ref 4.7–6.1)
SODIUM SERPL-SCNC: 140 MMOL/L (ref 135–145)
WBC # BLD AUTO: 7.4 K/UL (ref 4.8–10.8)

## 2020-01-22 PROCEDURE — 700111 HCHG RX REV CODE 636 W/ 250 OVERRIDE (IP): Performed by: INTERNAL MEDICINE

## 2020-01-22 PROCEDURE — 770020 HCHG ROOM/CARE - TELE (206)

## 2020-01-22 PROCEDURE — 99497 ADVNCD CARE PLAN 30 MIN: CPT | Performed by: INTERNAL MEDICINE

## 2020-01-22 PROCEDURE — 700102 HCHG RX REV CODE 250 W/ 637 OVERRIDE(OP): Performed by: INTERNAL MEDICINE

## 2020-01-22 PROCEDURE — A9270 NON-COVERED ITEM OR SERVICE: HCPCS | Performed by: INTERNAL MEDICINE

## 2020-01-22 PROCEDURE — 97110 THERAPEUTIC EXERCISES: CPT

## 2020-01-22 PROCEDURE — A9270 NON-COVERED ITEM OR SERVICE: HCPCS | Performed by: HOSPITALIST

## 2020-01-22 PROCEDURE — 97112 NEUROMUSCULAR REEDUCATION: CPT

## 2020-01-22 PROCEDURE — 93306 TTE W/DOPPLER COMPLETE: CPT

## 2020-01-22 PROCEDURE — 99232 SBSQ HOSP IP/OBS MODERATE 35: CPT | Performed by: INTERNAL MEDICINE

## 2020-01-22 PROCEDURE — 85025 COMPLETE CBC W/AUTO DIFF WBC: CPT

## 2020-01-22 PROCEDURE — 97535 SELF CARE MNGMENT TRAINING: CPT

## 2020-01-22 PROCEDURE — 93306 TTE W/DOPPLER COMPLETE: CPT | Mod: 26 | Performed by: INTERNAL MEDICINE

## 2020-01-22 PROCEDURE — 700102 HCHG RX REV CODE 250 W/ 637 OVERRIDE(OP): Performed by: HOSPITALIST

## 2020-01-22 PROCEDURE — 99498 ADVNCD CARE PLAN ADDL 30 MIN: CPT | Performed by: INTERNAL MEDICINE

## 2020-01-22 PROCEDURE — 80048 BASIC METABOLIC PNL TOTAL CA: CPT

## 2020-01-22 PROCEDURE — 700111 HCHG RX REV CODE 636 W/ 250 OVERRIDE (IP): Performed by: HOSPITALIST

## 2020-01-22 PROCEDURE — 97116 GAIT TRAINING THERAPY: CPT

## 2020-01-22 RX ADMIN — ENOXAPARIN SODIUM 40 MG: 100 INJECTION SUBCUTANEOUS at 05:52

## 2020-01-22 RX ADMIN — FUROSEMIDE 40 MG: 10 INJECTION, SOLUTION INTRAMUSCULAR; INTRAVENOUS at 15:16

## 2020-01-22 RX ADMIN — ENALAPRIL MALEATE 5 MG: 5 TABLET ORAL at 05:51

## 2020-01-22 RX ADMIN — ASPIRIN 325 MG ORAL TABLET 325 MG: 325 PILL ORAL at 05:51

## 2020-01-22 RX ADMIN — FUROSEMIDE 40 MG: 10 INJECTION, SOLUTION INTRAMUSCULAR; INTRAVENOUS at 05:51

## 2020-01-22 RX ADMIN — SENNOSIDES AND DOCUSATE SODIUM 2 TABLET: 8.6; 5 TABLET ORAL at 05:51

## 2020-01-22 ASSESSMENT — COGNITIVE AND FUNCTIONAL STATUS - GENERAL
STANDING UP FROM CHAIR USING ARMS: A LITTLE
MOBILITY SCORE: 16
MOVING FROM LYING ON BACK TO SITTING ON SIDE OF FLAT BED: A LITTLE
SUGGESTED CMS G CODE MODIFIER MOBILITY: CK
TURNING FROM BACK TO SIDE WHILE IN FLAT BAD: A LITTLE
WALKING IN HOSPITAL ROOM: A LITTLE
MOVING TO AND FROM BED TO CHAIR: A LITTLE
CLIMB 3 TO 5 STEPS WITH RAILING: TOTAL

## 2020-01-22 ASSESSMENT — ENCOUNTER SYMPTOMS
NAUSEA: 0
VOMITING: 0
MYALGIAS: 0
PALPITATIONS: 0
SORE THROAT: 0
FEVER: 0
ABDOMINAL PAIN: 0
DIZZINESS: 0
SHORTNESS OF BREATH: 1
DIARRHEA: 0
CHILLS: 0
COUGH: 1
DEPRESSION: 0
BLOOD IN STOOL: 0
HEADACHES: 0
WEAKNESS: 1
BACK PAIN: 0
FOCAL WEAKNESS: 0
NERVOUS/ANXIOUS: 1
HALLUCINATIONS: 0
HEARTBURN: 0

## 2020-01-22 ASSESSMENT — PATIENT HEALTH QUESTIONNAIRE - PHQ9
1. LITTLE INTEREST OR PLEASURE IN DOING THINGS: NOT AT ALL
SUM OF ALL RESPONSES TO PHQ9 QUESTIONS 1 AND 2: 0
2. FEELING DOWN, DEPRESSED, IRRITABLE, OR HOPELESS: NOT AT ALL

## 2020-01-22 ASSESSMENT — GAIT ASSESSMENTS
DEVIATION: STEP TO;DECREASED BASE OF SUPPORT;BRADYKINETIC
GAIT LEVEL OF ASSIST: MINIMAL ASSIST
DISTANCE (FEET): 30
ASSISTIVE DEVICE: FRONT WHEEL WALKER

## 2020-01-22 ASSESSMENT — PAIN SCALES - WONG BAKER: WONGBAKER_NUMERICALRESPONSE: DOESN'T HURT AT ALL

## 2020-01-22 NOTE — PROGRESS NOTES
[facility] Medicine Daily Progress Note    Date of Service  1/21/2020    Chief Complaint  89 y.o. male with a history of hypertension admitted 1/19/2020 with shortness of breath.    Hospital Course    History of hypertension and chronic 2 L oxygen dependent respiratory failure admitted with shortness of breath found to have acute on chronic respiratory failure secondary to pulmonary edema, elevated BNP      Interval Problem Update  Not breathing better since admission; has nose bleed on O2 per NC, changed to FM; no chest pain or other c/o.    Consultants/Specialty  None    Code Status  DNR, intubation okay    Disposition  To be determined pending PT OT    Review of Systems  Review of Systems   Constitutional: Positive for malaise/fatigue. Negative for chills and fever.   HENT: Negative for sore throat.    Respiratory: Positive for cough (After swallowing) and shortness of breath.    Cardiovascular: Negative for chest pain and palpitations.   Gastrointestinal: Negative for abdominal pain, blood in stool, diarrhea, heartburn, nausea and vomiting.   Genitourinary: Negative for dysuria and frequency.   Musculoskeletal: Negative for back pain and myalgias.   Neurological: Positive for weakness. Negative for dizziness, focal weakness and headaches.   Psychiatric/Behavioral: Negative for depression and hallucinations. The patient is nervous/anxious.    All other systems reviewed and are negative.       Physical Exam  Temp:  [36.2 °C (97.2 °F)-37 °C (98.6 °F)] 36.2 °C (97.2 °F)  Pulse:  [64-78] 73  Resp:  [18] 18  BP: (100-142)/(52-83) 130/59  SpO2:  [93 %-100 %] 98 %    Physical Exam  Constitutional:       Appearance: Normal appearance.      Comments: Thin   HENT:      Head: Normocephalic and atraumatic.      Nose: Nose normal.      Mouth/Throat:      Mouth: Mucous membranes are moist.   Eyes:      Extraocular Movements: Extraocular movements intact.      Pupils: Pupils are equal, round, and reactive to light.   Neck:       Musculoskeletal: Normal range of motion and neck supple.   Cardiovascular:      Rate and Rhythm: Normal rate and regular rhythm.      Heart sounds: No murmur.   Pulmonary:      Effort: Pulmonary effort is normal. No respiratory distress.      Breath sounds: No stridor.      Comments: Mild rales bibasilar  Abdominal:      General: Abdomen is flat. Bowel sounds are normal. There is no distension.      Palpations: Abdomen is soft.      Tenderness: There is no tenderness.   Musculoskeletal:         General: Swelling present. No tenderness or deformity.      Right lower leg: Edema present.      Left lower leg: Edema present.      Comments: 1+ BLE edema   Skin:     General: Skin is warm and dry.      Coloration: Skin is not pale.   Neurological:      General: No focal deficit present.      Mental Status: He is alert and oriented to person, place, and time. Mental status is at baseline.   Psychiatric:         Mood and Affect: Mood normal.         Behavior: Behavior normal.         Thought Content: Thought content normal.         Fluids    Intake/Output Summary (Last 24 hours) at 1/21/2020 1713  Last data filed at 1/21/2020 1415  Gross per 24 hour   Intake 220 ml   Output 850 ml   Net -630 ml       Laboratory  Recent Labs     01/19/20  1520 01/20/20  0358 01/21/20  0331   WBC 7.5 5.8 6.8   RBC 3.57* 3.41* 3.39*   HEMOGLOBIN 11.7* 11.6* 11.4*   HEMATOCRIT 37.4* 36.0* 35.7*   .8* 105.6* 105.3*   MCH 32.8 34.0* 33.6*   MCHC 31.3* 32.2* 31.9*   RDW 54.1* 53.3* 52.6*   PLATELETCT 147* 125* 134*   MPV 9.6 10.1 9.8     Recent Labs     01/19/20  1520 01/20/20  0358 01/21/20  0331   SODIUM 135 137 138   POTASSIUM 4.6 4.0 4.1   CHLORIDE 97 95* 93*   CO2 29 28 30   GLUCOSE 114* 91 84   BUN 25* 24* 24*   CREATININE 1.10 1.01 1.03   CALCIUM 8.6 8.6 8.8     Recent Labs     01/19/20  1520   APTT 27.7   INR 1.18*         Recent Labs     01/20/20  0906   TRIGLYCERIDE 49   HDL 96   LDL 48       Imaging  DX-ESOPHAGUS - [id number]    Final Result      CT-CTA CHEST PULMONARY ARTERY W/ RECONS   Final Result      No evidence of pulmonary embolus.      Peripheral interstitial prominence suggesting pulmonary edema.      Small, right greater than left, pleural effusions.                 Assessment/Plan  * Acute on chronic respiratory failure with hypoxia (HCC)- (present on admission)  Assessment & Plan  Secondary to acute CHF, unknown if systolic or diastolic as he has no history of CHF. Not breathing better despite current lasix treatment; not urinating more than at home; Increase lasix dose for better diuresis; check repeat echo  Wean oxygen as able      Acute exacerbation of CHF (congestive heart failure) (HCC)- (present on admission)  Assessment & Plan  No hx of CHF in the past, last echo was normal, very elevated BNP and pulmonary edema on x-ray  CTA PE neg for PE but shows pleural effusion and pulmonary edema.  IV lasix, increase dose  resume lisinopril 5 mg daily to allow room for  Strict I/Os; daily weights  Echocardiogram repeat ordered.      Dysphagia  Assessment & Plan  Noted by speech therapy  Modified diet for now  Modified barium swallow results noted; regular diet with thin liquids per ST    Macrocytic anemia- (present on admission)  Assessment & Plan  He denies blood loss  Check iron studies    Pain in the chest  Assessment & Plan  EKG unimpressive for acute ischemia  troponin's mildly elevated at 30, not rising.  No chest pain at this time.  Follow-up echocardiogram  Aspirin    COPD (chronic obstructive pulmonary disease) (HCC)- (present on admission)  Assessment & Plan  Baseline 2L at home, quit smoking 20 years ago  At this time no evidence of exacerbation  Continue RT protocol, duo nebs, Pep therapy if warranted, and incentive spirometry.       HTN (hypertension)- (present on admission)  Assessment & Plan  Resume home dose of enalapril        VTE prophylaxis: Lovenox

## 2020-01-22 NOTE — THERAPY
"Occupational Therapy Treatment completed with focus on ADLs, ADL transfers, patient education, cognition and upper extremity function.  Functional Status: A&Ox3. [place]. O2@3L NC. Agreeable for OOB activity. Just finished lunch. Performs sup to sit with close Sup, extra time. Donns socks with close Sup.STS with Sup, FWW. Transfers to chair with Jolie. Declines to groom while standing at the sink at this time. Shaves with set-up, Sup. Brushes teeth, washes face with Sup. Tolerates UIC post session; alarm in place.   Plan of Care: Will benefit from Occupational Therapy 2 times per week  Discharge Recommendations:  Equipment No Equipment Needed. Post-acute therapy Discharge to a transitional care facility for continued skilled therapy services vs. home home health and son's assistance/ Supervision - depending upon progress and son's availability for Sup at home.   See \"Rehab Therapy-Acute\" Patient Summary Report for complete documentation.   "

## 2020-01-22 NOTE — CARE PLAN
Problem: Communication  Goal: The ability to communicate needs accurately and effectively will improve  Outcome: PROGRESSING AS EXPECTED     Problem: Safety  Goal: Will remain free from injury  Outcome: PROGRESSING AS EXPECTED    Ongoing care. No acute issues at this time. Will continue care and monitoring as ordered. Patient resting with no c/o anything at this time. Waiting on placement and palliative options.

## 2020-01-22 NOTE — DIETARY
"Nutrition services: Day 3 of admit.  Jhon Begum is a 89 y.o. male with admitting DX of Chest Pain, Acute hypoxemic respiratory failure, CHF exacerbation.  Pt seen for BMI <19.  Pt with MST score of 1 per nutrition screen for poor oral intake.    Assessment:  Height: 175.3 cm (5' 9\")  Weight: 56 kg (123 lb 7.3 oz)  Body mass index is 18.23 kg/m²., BMI classification: Underweight  Diet/Intake: Regular, Dysphagia 3, thin liquid.  PO intake 1/21 breakfast %, dinner 50-75%.  1/22 breakfast <25%.    Evaluation:   1. Pt did not eat much at lunch today, just a few bites.  Noted pt ate <25% at breakfast as well.  2. Admit weight of 60 kg was taken on wheelchair scale.  Standing scale weight was obtained yesterday = 55.8 kg, which is consistent with bed scale weight today.  3. Per chart review, weight on 5/27/19 = 55.2 kg.  Weight is stable, but pt is underweight.  4. Discussed poor intake with MD and obtained order for supplement.      Malnutrition Risk: Criteria not met.    Recommendations/Plan:  1. Boost Plus with meals.   2. Encourage intake of meals and supplements.  3. Document intake of all meals and supplements as % taken in ADL's to provide interdisciplinary communication across all shifts.   4. Monitor weight.  5. Nutrition rep will continue to see patient for ongoing meal and snack preferences.     RD following.          "

## 2020-01-22 NOTE — PROGRESS NOTES
Report received. Assumed care of pt. A/O x3. VSS. Responds appropriately. Denies pain, denies SOB. Assessment completed. Explained importance of calling before getting OOB. Bed alarm on.  Call light and belongings within reach. Bed in the lowest position. Treaded socks in place. Hourly rounding in progress. Safety precautions in place

## 2020-01-22 NOTE — PROGRESS NOTES
Telemetry Shift Summary     Rhythm Sr SB  HR Range 55-70  Ectopy O PVCs O PACs R Coup  Measurements 0.16/0.10/0.34       Normal Values  Rhythm SR  HR Range    Measurements 0.12-0.20 / 0.06-0.10  / 0.30-0.52    Ongoing care. No acute issues at this time. Will contineuc are and monitoring as ordered. Patient is resting, with normal breathing. Will continue care and monitoring as ordered.

## 2020-01-22 NOTE — THERAPY
"Physical Therapy Treatment completed.   Bed Mobility:  Supine to Sit: Supervised  Transfers: Sit to Stand: Minimal Assist  Gait: Level Of Assist: Minimal Assist with Front-Wheel Walker       Plan of Care: Will benefit from Physical Therapy 3 times per week  Discharge Recommendations: Equipment: Front-Wheel Walker. Recommend post-acute placement for continued physical therapy services prior to discharge home. Patient can tolerate post-acute therapies at a 5x/week frequency.       See \"Rehab Therapy-Acute\" Patient Summary Report for complete documentation.     Pt is progressing slowly with activity tolernace, strength, and ambulation. Pt was only able to ambulate about 30ft within his room and perfrom 5 sit<>stands for ther-ex; pt demosntrated with increased fatigue, SOB, and WOB. Pt appears to not have functional activity tolerance for functional ambulation at this time. Pt will continue to benefit from skilled PT while in house, with recommendation for post acute therapy prior to d/c home given current objective findings. Will continue to follow while in house.   "

## 2020-01-22 NOTE — PROGRESS NOTES
Pt sitting up in chair, chair alarm on, call light within reach. A&O x4. Denies any pain or discomfort.

## 2020-01-22 NOTE — PROGRESS NOTES
[facility] Medicine Daily Progress Note    Date of Service  1/22/2020    Chief Complaint  89 y.o. male with a history of hypertension admitted 1/19/2020 with shortness of breath.    Hospital Course    History of hypertension and chronic 2 L oxygen dependent respiratory failure admitted with shortness of breath found to have acute on chronic respiratory failure secondary to pulmonary edema, elevated BNP      Interval Problem Update  States that breathing is not better although looking more comfortable without labored breathing and 100% O2 sat on 3.5 L; no new c/o; not seen by PT yet.    Consultants/Specialty  None    Code Status  DNR, intubation okay    Disposition  To be determined pending PT OT    Review of Systems  Review of Systems   Constitutional: Positive for malaise/fatigue. Negative for chills and fever.   HENT: Negative for sore throat.    Respiratory: Positive for cough (After swallowing) and shortness of breath.    Cardiovascular: Negative for chest pain and palpitations.   Gastrointestinal: Negative for abdominal pain, blood in stool, diarrhea, heartburn, nausea and vomiting.   Genitourinary: Negative for dysuria and frequency.   Musculoskeletal: Negative for back pain and myalgias.   Neurological: Positive for weakness. Negative for dizziness, focal weakness and headaches.   Psychiatric/Behavioral: Negative for depression and hallucinations. The patient is nervous/anxious.    All other systems reviewed and are negative.       Physical Exam  Temp:  [36.2 °C (97.2 °F)-36.4 °C (97.6 °F)] 36.3 °C (97.3 °F)  Pulse:  [62-73] 65  Resp:  [18] 18  BP: (109-138)/(53-70) 138/70  SpO2:  [95 %-100 %] 100 %    Physical Exam  Constitutional:       Appearance: Normal appearance.      Comments: Thin   HENT:      Head: Normocephalic and atraumatic.      Nose: Nose normal.      Mouth/Throat:      Mouth: Mucous membranes are moist.   Eyes:      Extraocular Movements: Extraocular movements intact.      Pupils: Pupils are  equal, round, and reactive to light.   Neck:      Musculoskeletal: Normal range of motion and neck supple.   Cardiovascular:      Rate and Rhythm: Normal rate and regular rhythm.      Heart sounds: No murmur.   Pulmonary:      Effort: Pulmonary effort is normal. No respiratory distress.      Breath sounds: No stridor.      Comments: Mild rales bibasilar, improving  Abdominal:      General: Abdomen is flat. Bowel sounds are normal. There is no distension.      Palpations: Abdomen is soft.      Tenderness: There is no tenderness.   Musculoskeletal:         General: No swelling, tenderness or deformity.      Right lower leg: No edema.      Left lower leg: No edema.      Comments: Resolved edema   Skin:     General: Skin is warm and dry.      Coloration: Skin is not pale.   Neurological:      General: No focal deficit present.      Mental Status: He is alert and oriented to person, place, and time. Mental status is at baseline.   Psychiatric:         Mood and Affect: Mood normal.         Behavior: Behavior normal.         Thought Content: Thought content normal.         Fluids    Intake/Output Summary (Last 24 hours) at 1/22/2020 0905  Last data filed at 1/21/2020 2320  Gross per 24 hour   Intake 460 ml   Output 950 ml   Net -490 ml       Laboratory  Recent Labs     01/20/20  0358 01/21/20  0331 01/22/20  0425   WBC 5.8 6.8 7.4   RBC 3.41* 3.39* 3.67*   HEMOGLOBIN 11.6* 11.4* 12.2*   HEMATOCRIT 36.0* 35.7* 38.9*   .6* 105.3* 106.0*   MCH 34.0* 33.6* 33.2*   MCHC 32.2* 31.9* 31.4*   RDW 53.3* 52.6* 52.1*   PLATELETCT 125* 134* 123*   MPV 10.1 9.8 9.6     Recent Labs     01/20/20  0358 01/21/20  0331 01/22/20  0425   SODIUM 137 138 140   POTASSIUM 4.0 4.1 3.6   CHLORIDE 95* 93* 94*   CO2 28 30 34*   GLUCOSE 91 84 88   BUN 24* 24* 21   CREATININE 1.01 1.03 0.92   CALCIUM 8.6 8.8 8.6     Recent Labs     01/19/20  1520   APTT 27.7   INR 1.18*         Recent Labs     01/20/20  0906   TRIGLYCERIDE 49   HDL 96   LDL 48        Imaging  EC-ECHOCARDIOGRAM COMPLETE W/O CONT         DX-ESOPHAGUS - [id number]   Final Result      CT-CTA CHEST PULMONARY ARTERY W/ RECONS   Final Result      No evidence of pulmonary embolus.      Peripheral interstitial prominence suggesting pulmonary edema.      Small, right greater than left, pleural effusions.                 Assessment/Plan  * Acute on chronic respiratory failure with hypoxia (HCC)- (present on admission)  Assessment & Plan  Secondary to acute CHF, unknown if systolic or diastolic as he has no history of CHF. Diuresing better with increased lasix dose; check repeat echo  Wean oxygen as able      Acute exacerbation of CHF (congestive heart failure) (HCC)- (present on admission)  Assessment & Plan  No hx of CHF in the past, last echo was normal, very elevated BNP and pulmonary edema on x-ray  CTA PE neg for PE but shows pleural effusion and pulmonary edema.  IV lasix, increase dose  resume lisinopril 5 mg daily to allow room for  Strict I/Os; daily weights  Echocardiogram repeat ordered.      Dysphagia- (present on admission)  Assessment & Plan  Noted by speech therapy  Modified barium swallow results noted; regular diet with thin liquids per ST    Macrocytic anemia- (present on admission)  Assessment & Plan  He denies blood loss  Check iron studies    Pain in the chest- (present on admission)  Assessment & Plan  EKG unimpressive for acute ischemia  troponin's mildly elevated at 30, not rising.  No chest pain at this time.  Follow-up echocardiogram  Aspirin    COPD (chronic obstructive pulmonary disease) (HCC)- (present on admission)  Assessment & Plan  Baseline 2L at home, quit smoking 20 years ago  At this time no evidence of exacerbation  Continue RT protocol, duo nebs, Pep therapy if warranted, and incentive spirometry.       HTN (hypertension)- (present on admission)  Assessment & Plan  Resume home dose of enalapril        VTE prophylaxis: Lovenox

## 2020-01-22 NOTE — PROGRESS NOTES
Telemetry summary:  Rhythm: SR, SB     HR Range: 50s to 80s      Measurements from strip printed at 07:11:39   MS: 0.12   QRS 0.10   QT 0.40     Ectopies: Rare PVC.

## 2020-01-22 NOTE — CARE PLAN
Problem: Communication  Goal: The ability to communicate needs accurately and effectively will improve  Outcome: PROGRESSING AS EXPECTED     Problem: Safety  Goal: Will remain free from injury  Outcome: PROGRESSING AS EXPECTED  No injury     Problem: Bowel/Gastric:  Goal: Normal bowel function is maintained or improved  Outcome: PROGRESSING AS EXPECTED  Large BM this am 1/22/2020     Problem: Knowledge Deficit  Goal: Knowledge of disease process/condition, treatment plan, diagnostic tests, and medications will improve  Outcome: PROGRESSING AS EXPECTED  Understands POC     Problem: Discharge Barriers/Planning  Goal: Patient's continuum of care needs will be met  Outcome: PROGRESSING AS EXPECTED  Will need SNF     Problem: Urinary Elimination:  Goal: Ability to reestablish a normal urinary elimination pattern will improve  Outcome: PROGRESSING AS EXPECTED  Using the urinal for voids     Problem: Mobility  Goal: Risk for activity intolerance will decrease  Outcome: PROGRESSING AS EXPECTED

## 2020-01-23 ENCOUNTER — PATIENT OUTREACH (OUTPATIENT)
Dept: HEALTH INFORMATION MANAGEMENT | Facility: OTHER | Age: 85
End: 2020-01-23

## 2020-01-23 ENCOUNTER — HOSPICE ADMISSION (OUTPATIENT)
Dept: HOSPICE | Facility: HOSPICE | Age: 85
End: 2020-01-23
Payer: MEDICARE

## 2020-01-23 ENCOUNTER — HOME CARE VISIT (OUTPATIENT)
Dept: HOSPICE | Facility: HOSPICE | Age: 85
End: 2020-01-23
Payer: MEDICARE

## 2020-01-23 LAB
ANION GAP SERPL CALC-SCNC: 9 MMOL/L (ref 0–11.9)
BUN SERPL-MCNC: 22 MG/DL (ref 8–22)
CALCIUM SERPL-MCNC: 9.2 MG/DL (ref 8.5–10.5)
CHLORIDE SERPL-SCNC: 95 MMOL/L (ref 96–112)
CO2 SERPL-SCNC: 39 MMOL/L (ref 20–33)
CREAT SERPL-MCNC: 0.95 MG/DL (ref 0.5–1.4)
GLUCOSE SERPL-MCNC: 94 MG/DL (ref 65–99)
POTASSIUM SERPL-SCNC: 3.3 MMOL/L (ref 3.6–5.5)
SODIUM SERPL-SCNC: 143 MMOL/L (ref 135–145)

## 2020-01-23 PROCEDURE — 700102 HCHG RX REV CODE 250 W/ 637 OVERRIDE(OP): Performed by: INTERNAL MEDICINE

## 2020-01-23 PROCEDURE — 700111 HCHG RX REV CODE 636 W/ 250 OVERRIDE (IP): Performed by: INTERNAL MEDICINE

## 2020-01-23 PROCEDURE — A9270 NON-COVERED ITEM OR SERVICE: HCPCS | Performed by: INTERNAL MEDICINE

## 2020-01-23 PROCEDURE — 80048 BASIC METABOLIC PNL TOTAL CA: CPT

## 2020-01-23 PROCEDURE — 99232 SBSQ HOSP IP/OBS MODERATE 35: CPT | Performed by: INTERNAL MEDICINE

## 2020-01-23 PROCEDURE — A9270 NON-COVERED ITEM OR SERVICE: HCPCS | Performed by: HOSPITALIST

## 2020-01-23 PROCEDURE — 770020 HCHG ROOM/CARE - TELE (206)

## 2020-01-23 PROCEDURE — 700111 HCHG RX REV CODE 636 W/ 250 OVERRIDE (IP): Performed by: HOSPITALIST

## 2020-01-23 PROCEDURE — 92526 ORAL FUNCTION THERAPY: CPT

## 2020-01-23 PROCEDURE — 700102 HCHG RX REV CODE 250 W/ 637 OVERRIDE(OP): Performed by: HOSPITALIST

## 2020-01-23 RX ORDER — POTASSIUM CHLORIDE 20 MEQ/1
20 TABLET, EXTENDED RELEASE ORAL DAILY
Status: DISCONTINUED | OUTPATIENT
Start: 2020-01-23 | End: 2020-01-29 | Stop reason: HOSPADM

## 2020-01-23 RX ADMIN — POTASSIUM CHLORIDE 20 MEQ: 20 TABLET, EXTENDED RELEASE ORAL at 10:44

## 2020-01-23 RX ADMIN — FUROSEMIDE 40 MG: 10 INJECTION, SOLUTION INTRAMUSCULAR; INTRAVENOUS at 06:29

## 2020-01-23 RX ADMIN — ENOXAPARIN SODIUM 40 MG: 100 INJECTION SUBCUTANEOUS at 06:29

## 2020-01-23 RX ADMIN — ASPIRIN 325 MG ORAL TABLET 325 MG: 325 PILL ORAL at 06:29

## 2020-01-23 RX ADMIN — ENALAPRIL MALEATE 5 MG: 5 TABLET ORAL at 06:29

## 2020-01-23 ASSESSMENT — ENCOUNTER SYMPTOMS
HEARTBURN: 0
HALLUCINATIONS: 0
CHILLS: 0
DIARRHEA: 0
WEAKNESS: 1
FOCAL WEAKNESS: 0
SORE THROAT: 0
COUGH: 0
FEVER: 0
PALPITATIONS: 0
BACK PAIN: 0
HEADACHES: 0
ABDOMINAL PAIN: 0
DIZZINESS: 0
BLOOD IN STOOL: 0
NAUSEA: 0
MYALGIAS: 0
SHORTNESS OF BREATH: 0
NERVOUS/ANXIOUS: 1
VOMITING: 0
DEPRESSION: 0

## 2020-01-23 NOTE — ASSESSMENT & PLAN NOTE
Acute on chronic; can walk with walker for short distance at home; weaker now due to acute illnesses, advised by PT/OT to transfer to SNF but patient declined.  Lives with son, planning for  w O2

## 2020-01-23 NOTE — CARE PLAN
Problem: Communication  Goal: The ability to communicate needs accurately and effectively will improve  Outcome: PROGRESSING AS EXPECTED     Problem: Safety  Goal: Will remain free from injury  Outcome: PROGRESSING AS EXPECTED    Ongoing care. Patient resting with no c/o anything at this time, watching jeopardy. Patients plan is to go back home on hospice. Social work on case, PTOT eval continuing. Will continue care and monitoring as ordered as ordered.

## 2020-01-23 NOTE — DISCHARGE PLANNING
Discussed pt's case with [facility] RN GERMAN. Pt is accepted back onto service with [facility]. Family working on arranging home where pt will d/c to. [facility] also working on DME arrangements       Care Transition Team Assessment    Information Source  Orientation : Oriented x 4(Forgetful)  Information Given By: Patient         Elopement Risk  Legal Hold: No  Ambulatory or Self Mobile in Wheelchair: No-Not an Elopement Risk  Elopement Risk: Not at Risk for Elopement    Interdisciplinary Discharge Planning  Does Admitting Nurse Feel This Could be a Complex Discharge?: No  Lives with - Patient's Self Care Capacity: Adult Children  Patient or legal guardian wants to designate a caregiver (see row info): No  Support Systems: Family Member(s)  Housing / Facility: Mobile Home  Able to Return to Previous ADL's: No  Mobility Issues: Yes  Prior Services: Intermittent Physical Support for ADL Per Family  Patient Expects to be Discharged to:: home  Assistance Needed: No  Durable Medical Equipment: Home Oxygen    Discharge Preparedness  What is your plan after discharge?: Home with help(Home with hospice)  What are your discharge supports?: Child  Prior Functional Level: Needs Assist with Activities of Daily Living, Needs Assist with Medication Management    Functional Assesment  Prior Functional Level: Needs Assist with Activities of Daily Living, Needs Assist with Medication Management    Finances  Financial Barriers to Discharge: No  Prescription Coverage: Yes    Vision / Hearing Impairment  Vision Impairment : Yes  Right Eye Vision: Impaired, Patient Declines to Wear Visual Aid  Left Eye Vision: Impaired, Patient Declines to Wear Visual Aid  Hearing Impairment: Both Ears, Patient Declines to Wear Hearing Device(s)              Domestic Abuse  Have you ever been the victim of abuse or violence?: No  Physical Abuse or Sexual Abuse: No  Verbal Abuse or Emotional Abuse: No  Possible Abuse Reported to:: Not  Applicable    Psychological Assessment  History of Substance Abuse: None  History of Psychiatric Problems: No  Non-compliant with Treatment: No  Newly Diagnosed Illness: No    Discharge Risks or Barriers  Discharge risks or barriers?: No    Anticipated Discharge Information  Anticipated discharge disposition: Hospice

## 2020-01-23 NOTE — PROGRESS NOTES
Telemetry Shift Summary     Rhythm SR  HR Range 65-80   Ectopy in and out ASR  Measurements 0.16/0.08/0.40       Normal Values  Rhythm SR  HR Range    Measurements 0.12-0.20 / 0.06-0.10  / 0.30-0.52    Ongoing care. No acute issues at this time. Will continue care and monitoring as ordered. Patient has rested all of tonight without c/o anything. Will continue care.

## 2020-01-23 NOTE — DISCHARGE PLANNING
Anticipated Discharge Disposition:   · Home with Hospice     Action:   · Pt previously on services with [facility] and per son, Moi would like to resume services. Verbal for CHOICE obtained from Dr. Tavera.   · LSW faxed CHOICE to [facility] ext [phone]    Barriers to Discharge:   · Hospice acceptance  Plan:   · Care coordination will continue to follow up and provide assistance with discharge plans/barriers.

## 2020-01-23 NOTE — CONSULTS
Reason for Palliative Care Consult: Advance Care Planning    Consulted by: Truong munoz MD    HPI: Mr Begum is a 89 yo M with admission to [facility] 1/19/20 with several week H/O increasing SOB and precordial Chest pressure. CT scan of chest negative for PE but positive for pulmonary edema. Troponin elevated at 34 but EKG not abnormal. proBNP 52229 and Echocardiogram shown grade II diastolic dysfunction with EF at 55%. I was asked to see the patient to evaluate for Hospice care    Past medical/surgical history: COPD, debility, diastolic heart failure, frailty, total hip 2008, 60 pky HO tobacco use quit 0 years ago. Was on [facility] 5/23/19-11/22/19 but discharged DT plateau in disease progression      Assessment:  Neuro: O X2  Dyspnea: Yes- RR 24 at rest  Last BM: 01/22/20-    Pain: Yes- Constant chest pressure  Depression: Mood appropriate for situation-    Dementia: No;       Living situation & psychosocial: Lives off of [address] with son Felipa Begum    Spiritual:  Is Mandaeism or spirituality important for coping with this illness?  -    Has a  or spiritual provider visit been requested?      Palliative Performance Scale: 40%    Advance Directive:  -    DPOA:  -  Moi Begum, son [phone]  POLST: Yes-      Code Status: DNR-      Outcome:  I initially Met with Jhon at bedside.  He was very sleepy but was able to give me some history. He does not appear to remember that he was on Hospice but does remember some nurses visiting him. He reports poor appetite, increased SOB, weakness and ongoing anterior chest pressure. I asked permission to call his son Moi which he gave. I called Moi who reported the in November Jhon was doing really well but that over the past several weeks he has been declining rapidly. Moi does not live with Jhon but his brother Felipa does. Moi expressed concern that Jhon will require more care for safety reasons than Felipa can provide. Moi reported that there was not  enough money for a group home and he feel that Jhon will do better in a group home. He was very happy with [facility] prior and would like to go back with them. I let him know that the Hospice nurse and  will assist with hiring extra help if needed after hospice starts up again. Moi reported that his wife has some hired caregivers for her mother. Because Jhon's code status is currently DNR, I OK but had been DNR/DNI in the past I reviewed this with Moi. After explaining the process of resuscitation and intubation and the burdens therein for his father Moi wished me to change his code status to DNR/DNI. He gave me permission to complete a choice form on his behalf for [facility] (I did offer other hospices but he declined).         Plan: Hospice choice form completed with social workNa and faxed to [facility]. Code status changed to DNR/DNI. Hospice order placed.    Recommendations: I recommend a hospice consult.    Updated: Hospice nurse Tammie Gao RN    Thank you for allowing Palliative Care to participate in this patient's care. Please call our team with questions and/or additional needs.    Total visit time was 50 minutes discussing advance care planning.

## 2020-01-23 NOTE — PROGRESS NOTES
[facility] Medicine Daily Progress Note    Date of Service  1/23/2020    Chief Complaint  89 y.o. male with a history of hypertension admitted 1/19/2020 with shortness of breath.    Hospital Course    History of hypertension and chronic 2 L oxygen dependent respiratory failure admitted with shortness of breath found to have acute on chronic respiratory failure secondary to pulmonary edema, elevated BNP      Interval Problem Update  Breathing ok now; not eating well and weaker than baseline; does not want to go to SNF;     Consultants/Specialty  None    Code Status  DNR, DNI    Disposition  Patient declined SNF; home with  likely    Review of Systems  Review of Systems   Constitutional: Positive for malaise/fatigue. Negative for chills and fever.   HENT: Negative for sore throat.    Respiratory: Negative for cough (After swallowing) and shortness of breath.    Cardiovascular: Negative for chest pain and palpitations.   Gastrointestinal: Negative for abdominal pain, blood in stool, diarrhea, heartburn, nausea and vomiting.   Genitourinary: Negative for dysuria and frequency.   Musculoskeletal: Negative for back pain and myalgias.   Neurological: Positive for weakness. Negative for dizziness, focal weakness and headaches.   Psychiatric/Behavioral: Negative for depression and hallucinations. The patient is nervous/anxious.    All other systems reviewed and are negative.       Physical Exam  Temp:  [36.4 °C (97.5 °F)-37.2 °C (98.9 °F)] 36.7 °C (98.1 °F)  Pulse:  [65-86] 76  Resp:  [18-20] 20  BP: ()/(43-82) 142/67  SpO2:  [80 %-100 %] 100 %    Physical Exam  Constitutional:       Appearance: Normal appearance.      Comments: Thin   HENT:      Head: Normocephalic and atraumatic.      Nose: Nose normal.      Mouth/Throat:      Mouth: Mucous membranes are moist.   Eyes:      Extraocular Movements: Extraocular movements intact.      Pupils: Pupils are equal, round, and reactive to light.   Neck:      Musculoskeletal:  Normal range of motion and neck supple.   Cardiovascular:      Rate and Rhythm: Normal rate and regular rhythm.      Heart sounds: No murmur.   Pulmonary:      Effort: Pulmonary effort is normal. No respiratory distress.      Breath sounds: No stridor.      Comments: Resolving rales  Abdominal:      General: Abdomen is flat. Bowel sounds are normal. There is no distension.      Palpations: Abdomen is soft.      Tenderness: There is no tenderness.   Musculoskeletal:         General: No swelling, tenderness or deformity.      Right lower leg: No edema.      Left lower leg: No edema.      Comments: Resolved edema   Skin:     General: Skin is warm and dry.      Coloration: Skin is not pale.   Neurological:      General: No focal deficit present.      Mental Status: He is alert and oriented to person, place, and time. Mental status is at baseline.   Psychiatric:         Mood and Affect: Mood normal.         Behavior: Behavior normal.         Thought Content: Thought content normal.         Fluids    Intake/Output Summary (Last 24 hours) at 1/23/2020 0936  Last data filed at 1/22/2020 2343  Gross per 24 hour   Intake 250 ml   Output 575 ml   Net -325 ml       Laboratory  Recent Labs     01/21/20  0331 01/22/20  0425   WBC 6.8 7.4   RBC 3.39* 3.67*   HEMOGLOBIN 11.4* 12.2*   HEMATOCRIT 35.7* 38.9*   .3* 106.0*   MCH 33.6* 33.2*   MCHC 31.9* 31.4*   RDW 52.6* 52.1*   PLATELETCT 134* 123*   MPV 9.8 9.6     Recent Labs     01/21/20  0331 01/22/20  0425 01/23/20  0500   SODIUM 138 140 143   POTASSIUM 4.1 3.6 3.3*   CHLORIDE 93* 94* 95*   CO2 30 34* 39*   GLUCOSE 84 88 94   BUN 24* 21 22   CREATININE 1.03 0.92 0.95   CALCIUM 8.8 8.6 9.2                   Imaging  EC-ECHOCARDIOGRAM COMPLETE W/O CONT   Final Result      DX-ESOPHAGUS - [id number]   Final Result      CT-CTA CHEST PULMONARY ARTERY W/ RECONS   Final Result      No evidence of pulmonary embolus.      Peripheral interstitial prominence suggesting pulmonary  edema.      Small, right greater than left, pleural effusions.                 Assessment/Plan  * Acute on chronic respiratory failure with hypoxia (HCC)- (present on admission)  Assessment & Plan  Secondary to acute CHF,new onset. Diuresing better with increased lasix dose; change to po lasix as CHF is compensated now  Echo showed 55% EF, grade 2 diastolic dysfunction and mild AI/MR  Wean oxygen to baseline of 2 L  Home soon; being evaluated by palliative care/hospice (previous hospice enrollment)      Acute exacerbation of CHF (congestive heart failure) ([facility])- (present on admission)  Assessment & Plan  No hx of CHF in the past, echo noted, very elevated BNP and pulmonary edema on x-ray  CTA PE neg for PE but shows pleural effusion and pulmonary edema.  Responded well to iv lasix; compensated now, change to po lasix  resume lisinopril 5 mg daily to allow room for  Strict I/Os; daily weights        Dysphagia- (present on admission)  Assessment & Plan  Noted by speech therapy  Modified barium swallow results noted; regular diet with thin liquids per ST    Macrocytic anemia- (present on admission)  Assessment & Plan  He denies blood loss  Check iron studies    Pain in the chest- (present on admission)  Assessment & Plan  EKG unimpressive for acute ischemia  troponin's mildly elevated at 30, not rising.  No chest pain at this time.  Follow-up echocardiogram  Aspirin    COPD (chronic obstructive pulmonary disease) ([facility])- (present on admission)  Assessment & Plan  Baseline 2L at home, quit smoking 20 years ago  At this time no evidence of exacerbation  Continue RT protocol.       Weakness- (present on admission)  Assessment & Plan  Acute on chronic; can walk with walker for short distance at home; weaker now due to acute illnesses, advised by PT/OT to transfer to SNF but patient declined.  Lives with son.  Probably dc home with  soon.    HTN (hypertension)- (present on admission)  Assessment & Plan  Resume home dose of  enalapril        VTE prophylaxis: Lovenox

## 2020-01-23 NOTE — CARE PLAN
Problem: Infection  Goal: Will remain free from infection  Outcome: PROGRESSING AS EXPECTED  Intervention: Implement standard precautions and perform hand washing before and after patient contact  Note:   Standard precautions and hand hygiene practices implemented before and after patient room entry. Gloves worn during times of patient contact.       Problem: Pain Management  Goal: Pain level will decrease to patient's comfort goal  Outcome: PROGRESSING AS EXPECTED  Intervention: Follow pain managment plan developed in collaboration with patient and Interdisciplinary Team  Note:   Patient denies pain at this time. Comfortable in bed, repositioned for comfort frequently.

## 2020-01-23 NOTE — DISCHARGE PLANNING
ATTN: Case Management   RE: Referral for Hospice    [facility] is currently reviewing this referral. A nurse liaison will be in contact with the patient and family to assess for Hospice appropriateness. For immediate assistance, please call [phone] to speak to a member of our intake team.

## 2020-01-23 NOTE — THERAPY
"  Speech Language Therapy dysphagia therapy  completed.  Functional Status: Patient was seen for dysphagia therapy this date. Limited session secondary to upon entering room patient with bloody nose and clots, RN and CNA aware. Patient consumed trials of thin liquids via straw x4. Patient tolerated well with no clinical s/sx of aspiration, pt stated eating regular foods okay. Patient demonstrating overall weakness. Per notes patient plan to d/c home with hospice. At this time recommend continue regular diet/thin liquids per MBSS recommendations, recommend assist for set up for all meals. SLP to follow with meal as able. Discussed with RN.   Recommendations - Diet: Diet / Liquid Recommendation: Regular, Thin Liquid                          Strategies: Monitor during meals, Assistance needed for meal tray set-up and Head of Bed at 90 Degrees                          Medication Administration: Medication Administration : Float Whole with Puree  Plan of Care: Will benefit from Speech Therapy 3 times per week  Post-Acute Therapy: Discharge to a transitional care facility for continued skilled therapy services.    See \"Rehab Therapy-Acute\" Patient Summary Report for complete documentation.   "

## 2020-01-23 NOTE — DISCHARGE PLANNING
Received Choice form at [phone]  Agency/Facility Name: [facility]  Referral sent per Choice form @ [phone]

## 2020-01-23 NOTE — PROGRESS NOTES
Telemetry Shift Summary    Rhythm SR  HR Range 60s-80s  Ectopy occasional PVC, rare PAC  Measurements 0.16/0.08/0.48        Normal Values  Rhythm SR  HR Range    Measurements 0.12-0.20 / 0.06-0.10  / 0.30-0.52

## 2020-01-24 ENCOUNTER — HOME CARE VISIT (OUTPATIENT)
Dept: HOSPICE | Facility: HOSPICE | Age: 85
End: 2020-01-24
Payer: MEDICARE

## 2020-01-24 LAB
ANION GAP SERPL CALC-SCNC: 10 MMOL/L (ref 0–11.9)
BUN SERPL-MCNC: 28 MG/DL (ref 8–22)
CALCIUM SERPL-MCNC: 9 MG/DL (ref 8.4–10.2)
CHLORIDE SERPL-SCNC: 94 MMOL/L (ref 96–112)
CO2 SERPL-SCNC: 38 MMOL/L (ref 20–33)
CREAT SERPL-MCNC: 0.85 MG/DL (ref 0.5–1.4)
GLUCOSE SERPL-MCNC: 127 MG/DL (ref 65–99)
POTASSIUM SERPL-SCNC: 3.8 MMOL/L (ref 3.6–5.5)
SODIUM SERPL-SCNC: 142 MMOL/L (ref 135–145)

## 2020-01-24 PROCEDURE — 770020 HCHG ROOM/CARE - TELE (206)

## 2020-01-24 PROCEDURE — A9270 NON-COVERED ITEM OR SERVICE: HCPCS | Performed by: INTERNAL MEDICINE

## 2020-01-24 PROCEDURE — 99232 SBSQ HOSP IP/OBS MODERATE 35: CPT | Performed by: INTERNAL MEDICINE

## 2020-01-24 PROCEDURE — 80048 BASIC METABOLIC PNL TOTAL CA: CPT

## 2020-01-24 PROCEDURE — 700102 HCHG RX REV CODE 250 W/ 637 OVERRIDE(OP): Performed by: HOSPITALIST

## 2020-01-24 PROCEDURE — 700102 HCHG RX REV CODE 250 W/ 637 OVERRIDE(OP): Performed by: INTERNAL MEDICINE

## 2020-01-24 PROCEDURE — 700111 HCHG RX REV CODE 636 W/ 250 OVERRIDE (IP): Performed by: HOSPITALIST

## 2020-01-24 PROCEDURE — A9270 NON-COVERED ITEM OR SERVICE: HCPCS | Performed by: HOSPITALIST

## 2020-01-24 RX ORDER — ENALAPRIL MALEATE 5 MG/1
5 TABLET ORAL DAILY
Qty: 30 TAB | Refills: 0 | Status: SHIPPED | OUTPATIENT
Start: 2020-01-25 | End: 2020-01-27

## 2020-01-24 RX ADMIN — ASPIRIN 325 MG ORAL TABLET 325 MG: 325 PILL ORAL at 05:35

## 2020-01-24 RX ADMIN — ENALAPRIL MALEATE 5 MG: 5 TABLET ORAL at 05:35

## 2020-01-24 RX ADMIN — ENOXAPARIN SODIUM 40 MG: 100 INJECTION SUBCUTANEOUS at 05:34

## 2020-01-24 ASSESSMENT — ACTIVITIES OF DAILY LIVING (ADL)
DRESSING_REQUIRES_ASSISTANCE: 1
BATHING_REQUIRES_ASSISTANCE: 1
PHYSICAL_TRANSFER_REQUIRES_ASSISTANCE: 1

## 2020-01-24 ASSESSMENT — COPD QUESTIONNAIRES: COPD: 1

## 2020-01-24 ASSESSMENT — ENCOUNTER SYMPTOMS
CHANGE IN LEVEL OF CONSCIOUSNESS: 1
SHORTNESS OF BREATH: 1

## 2020-01-24 NOTE — DISCHARGE PLANNING
Anticipated Discharge Disposition: Home with Hospice     Action: Discussed with [facility] RN. DME arrangements being made. Anticipate d/c tomorrow 1/25.    Barriers to Discharge: None    Plan: F/U with [facility]

## 2020-01-24 NOTE — DISCHARGE SUMMARY
"Discharge Summary    CHIEF COMPLAINT ON ADMISSION  Chief Complaint   Patient presents with   • Chest Pain     intermittent central CP x \" a few weeks but its getting worse\". 8/10 pain when present   • Shortness of Breath     Pt was in 80's on RA at home.        Reason for Admission  Chest pain     Admission Date  1/19/2020    CODE STATUS  DNAR/DNI    HPI & HOSPITAL COURSE  This is a 89 y.o. male here with past medical history of COPD, baseline on 2 L of oxygen, he says he uses it when he needs it,  Hx of HTN presented to the emergency room on 1/19/2020 chest pain shortness of breath which started several weeks ago and has been progressively worsening in terms of duration and intensity.  In terms of his chest pain patient says that discomfort is located around his left side of his chest, 8 out of 10 in severity, pressure-like sensation exacerbated by exertion and relieved by rest.  He also says he is short of breath and becomes more short of breath especially during exertion or ambulation.  Patient said last year he had a similar feeling at that time he underwent several diagnostics including a CT PE study which was negative, and an echocardiogram which showed a preserved LVEF function.  At this point patient denies having any active chest pain   Patient was found to have pulm edema and started on diuresis with IV lasix.  He diuresed well and breathing is back to baseline at time of discharge, requiring O2 2 L chronically.  He had no more pedal edema.  There was e/o metabolic alkalosis due to volume contraction with diuresis and lasix was held.  Echo showed:  Compared to the images of the study done 5/22/19 - there has been   improvement of aortic insufficiency.   Normal left ventricular chamber size.  Left ventricular ejection fraction is visually estimated to be 55%.  Grade II diastolic dysfunction.  Mild mitral regurgitation.  Mild aortic insufficiency.  Right ventricular systolic pressure is estimated to be 35 " mmHg.  Normal inferior vena cava size and inspiratory collapse.    Patient was consulted by palliative care and hospice and given his current conditions and prognosis, his son agreed to enroll patient with hospice.  [facility] accepted the case and patient will discharge home today.  Therefore, he is discharged in fair and stable condition to hospice.    The patient met 2-midnight criteria for an inpatient stay at the time of discharge.    Discharge Date  1/24/20    FOLLOW UP ITEMS POST DISCHARGE  With hospice    DISCHARGE DIAGNOSES  Principal Problem:    Acute on chronic respiratory failure with hypoxia (HCC) POA: Yes  Active Problems:    Acute exacerbation of CHF (congestive heart failure) ([facility]) POA: Yes    HTN (hypertension) POA: Yes    Weakness POA: Yes    COPD (chronic obstructive pulmonary disease) ([facility]) POA: Yes    Pain in the chest POA: Yes    Macrocytic anemia POA: Yes    Dysphagia POA: Yes  Resolved Problems:    * No resolved hospital problems. *      FOLLOW UP  No future appointments.  [facility]  [address] [place] [address]  [facility] [id number] [phone]  Schedule an appointment as soon as possible for a visit  call to schedule for sleep study    Ihsan Monet M.D.  [address] [address]  [phone]    On 2/3/2020  Please arrive at 1:30pm for your hospital follow up. Thank you.     hospice            MEDICATIONS ON DISCHARGE     Medication List      START taking these medications      Instructions   aspirin EC 81 MG Tbec  Commonly known as:  ECOTRIN   Take 1 Tab by mouth every day.  Dose:  81 mg        CHANGE how you take these medications      Instructions   enalapril 5 MG Tabs  Start taking on:  January 25, 2020  What changed:    · medication strength  · how much to take  Commonly known as:  VASOTEC   Take 1 Tab by mouth every day.  Dose:  5 mg            Allergies  No Known Allergies    DIET  Orders Placed This Encounter   Procedures   •  Diet Order Regular (Upright 90*. Monitor with meals.)     Standing Status:   Standing     Number of Occurrences:   1     Order Specific Question:   Diet:     Answer:   Regular [1]     Comments:   Upright 90*. Monitor with meals.     Order Specific Question:   Texture/Fiber modifications:     Answer:   Dysphagia 3(Mechanical Soft)specify fluid consistency(question 6) [3]     Comments:   chopped food     Order Specific Question:   Consistency/Fluid modifications:     Answer:   Thin Liquids [3]       ACTIVITY  As tolerated.  Weight bearing as tolerated    CONSULTATIONS  Hospice/palliative care    PROCEDURES  none    LABORATORY  Lab Results   Component Value Date    SODIUM 142 01/24/2020    POTASSIUM 3.8 01/24/2020    CHLORIDE 94 (L) 01/24/2020    CO2 38 (H) 01/24/2020    GLUCOSE 127 (H) 01/24/2020    BUN 28 (H) 01/24/2020    CREATININE 0.85 01/24/2020    CREATININE 1.2 03/25/2008        Lab Results   Component Value Date    WBC 7.4 01/22/2020    HEMOGLOBIN 12.2 (L) 01/22/2020    HEMATOCRIT 38.9 (L) 01/22/2020    PLATELETCT 123 (L) 01/22/2020        Total time of the discharge process exceeds 35 minutes.

## 2020-01-24 NOTE — PROGRESS NOTES
Telemetry Shift Summary    Rhythm SR, [employer] [id number]  HR Range 67-92 High: 166  Ectopy RPVC  Measurements 0.16/0.08/0.32        Normal Values  Rhythm SR  HR Range    Measurements 0.12-0.20 / 0.06-0.10  / 0.30-0.52

## 2020-01-24 NOTE — PROGRESS NOTES
Telemetry Shift Summary    Rhythm SR  HR Range 60s-80s  Ectopy frequent PVC, rare couplets/bigeminy  Measurements 0.16/0.08/0.36        Normal Values  Rhythm SR  HR Range    Measurements 0.12-0.20 / 0.06-0.10  / 0.30-0.52

## 2020-01-24 NOTE — CARE PLAN
Problem: Nutritional:  Goal: Achieve adequate nutritional intake  Description  Patient will consume >50% of meals   Outcome: PROGRESSING SLOWER THAN EXPECTED   Recorded PO intake <25% to 25-50%.  Offering Boost Plus with meals.  Continue to encourage PO intake.

## 2020-01-24 NOTE — CARE PLAN
Problem: Discharge Barriers/Planning  Goal: Patient's continuum of care needs will be met  Outcome: PROGRESSING AS EXPECTED  Intervention: Collaborate with Transitional Care Team and Interdisciplinary Team to meet discharge needs  Note:   Patient to discharge home with [facility]. Social work involved.     Problem: Fluid Volume:  Goal: Will maintain balanced intake and output  Outcome: PROGRESSING AS EXPECTED  Intervention: Monitor, educate, and encourage compliance with therapeutic intake of liquids  Note:   Patient verbalizes feeling hydrated, BP's stable. No signs of fluid overload or deficit.

## 2020-01-25 ENCOUNTER — HOME CARE VISIT (OUTPATIENT)
Dept: HOSPICE | Facility: HOSPICE | Age: 85
End: 2020-01-25
Payer: MEDICARE

## 2020-01-25 PROCEDURE — 770020 HCHG ROOM/CARE - TELE (206)

## 2020-01-25 PROCEDURE — 700102 HCHG RX REV CODE 250 W/ 637 OVERRIDE(OP): Performed by: INTERNAL MEDICINE

## 2020-01-25 PROCEDURE — 700102 HCHG RX REV CODE 250 W/ 637 OVERRIDE(OP): Performed by: HOSPITALIST

## 2020-01-25 PROCEDURE — 700111 HCHG RX REV CODE 636 W/ 250 OVERRIDE (IP): Performed by: HOSPITALIST

## 2020-01-25 PROCEDURE — A9270 NON-COVERED ITEM OR SERVICE: HCPCS | Performed by: INTERNAL MEDICINE

## 2020-01-25 PROCEDURE — A9270 NON-COVERED ITEM OR SERVICE: HCPCS | Performed by: HOSPITALIST

## 2020-01-25 PROCEDURE — 99232 SBSQ HOSP IP/OBS MODERATE 35: CPT | Performed by: INTERNAL MEDICINE

## 2020-01-25 RX ADMIN — ENALAPRIL MALEATE 5 MG: 5 TABLET ORAL at 05:55

## 2020-01-25 RX ADMIN — POTASSIUM CHLORIDE 20 MEQ: 20 TABLET, EXTENDED RELEASE ORAL at 05:56

## 2020-01-25 RX ADMIN — ASPIRIN 325 MG ORAL TABLET 325 MG: 325 PILL ORAL at 05:53

## 2020-01-25 RX ADMIN — ENOXAPARIN SODIUM 40 MG: 100 INJECTION SUBCUTANEOUS at 05:58

## 2020-01-25 ASSESSMENT — ENCOUNTER SYMPTOMS
ABDOMINAL PAIN: 0
FOCAL WEAKNESS: 0
FEVER: 0
HEADACHES: 0
DIZZINESS: 0
DIARRHEA: 0
NERVOUS/ANXIOUS: 1
HEARTBURN: 0
WEAKNESS: 1
MYALGIAS: 0
SHORTNESS OF BREATH: 0
PALPITATIONS: 0
BACK PAIN: 0
CHILLS: 0
SORE THROAT: 0
VOMITING: 0
COUGH: 0
HALLUCINATIONS: 0
BLOOD IN STOOL: 0
NAUSEA: 0
DEPRESSION: 0

## 2020-01-25 NOTE — DISCHARGE PLANNING
Agency/Facility Name: [facility]  Spoke To: Fox  Outcome: Transport canceled.    Received Choice form at 8020  Agency/Facility Name: 1. [facility] 2. [facility]  Referral sent per Choice form @ 9080

## 2020-01-25 NOTE — PROGRESS NOTES
Telemetry Shift Summary    Rhythm SR  HR Range 60s-80s  Ectopy occasional PVC/PAC, rare couplets  Measurements 0.16/0.08/0.44        Normal Values  Rhythm SR  HR Range    Measurements 0.12-0.20 / 0.06-0.10  / 0.30-0.52

## 2020-01-25 NOTE — CARE PLAN
Problem: Communication  Goal: The ability to communicate needs accurately and effectively will improve  Outcome: PROGRESSING AS EXPECTED  Note:   Pt effectively communicates needs. Call bell within reach.     Problem: Safety  Goal: Will remain free from falls  Outcome: PROGRESSING AS EXPECTED  Note:   Bed alarmed and in locked and lowest position, fall and safety measures in place.     Problem: Venous Thromboembolism (VTW)/Deep Vein Thrombosis (DVT) Prevention:  Goal: Patient will participate in Venous Thrombosis (VTE)/Deep Vein Thrombosis (DVT)Prevention Measures  Outcome: PROGRESSING AS EXPECTED  Note:   Pt ambulating x1 assist and performs active ROM. Lovenox administered as ordered.     Problem: Respiratory:  Goal: Respiratory status will improve  Outcome: PROGRESSING AS EXPECTED  Note:   Pt on 2L O2 via NC and back at baseline. Pt educated to turn, cough, and deep breathe. Pt motivated.

## 2020-01-25 NOTE — DISCHARGE PLANNING
"Anticipated Discharge Disposition: SNF vs HH    Action: Notified by [facility] that pt's son Moi no longer wants hospice.   Called Moi to discuss discharge plan. Moi states that he visit with pt yesterday and pt appeared very weak and like \"he needed something else\". He states he's concerned pt will return home on hospice and further deteriorate. He thinks pt would benefit from PT/OT/ST prior to returning home and is interested in SNF or HH. Had in depth discussion regarding LOC with SNF and HH. Moi states he's unsure how much therapy pt can tolerate. He states pt has been to [facility] in the past and family is interested in [facility]. Notified him that both these facilities don't have beds today. He states he's hesitant to select another SNF without seeing it first. Also discussed that HH can't be confirmed over the weekend d/t being unable to confirm PCP. Pt has history with [facility].  Discussed with MD who would prefer to obtain HH confirmation if family will take pt home with HH. Moi requested referral to both SNF and HH. Family will further discuss d/c plan and decide which route to take.   Choice form completed for SNF. MD will place HH order  Requested [facility] cancel transport home    Barriers to Discharge: SNF acceptance/HH acceptance    Plan: F/U with family on d/c plan  "

## 2020-01-25 NOTE — CARE PLAN
Problem: Mobility  Goal: Risk for activity intolerance will decrease  Outcome: PROGRESSING AS EXPECTED  Intervention: Encourage patient to increase activity level in collaboration with Interdisciplinary Team  Note:   PT/OT involved in assisting patient in mobilizing.     Problem: Skin Integrity  Goal: Risk for impaired skin integrity will decrease  Outcome: PROGRESSING AS EXPECTED  Intervention: Implement precautions to protect skin integrity in collaboration with the interdisciplinary team  Flowsheets (Taken 1/25/2020 0700)  Skin Preventative Measures: Pillows in Use for Support / Positioning;Silicone Oxygen Tubing in Use;Waffle Cushion;Gray Foam for Oxygen Tubing  Bed Types: Pressure Redistribution Mattress ([employer]mosair)  Friction Interventions: Draw Sheet / Pad Used for Repositioning  Patient Turns / Repositioning: Patient Turns Self from Side to Side  PT / OT Involved in Care: Physical Therapy Involved;Occupational Therapy Involved  Moisturizers: Moisturizer   Patient is Receiving Nutrition: Oral Intake Adequate  Vitamin Therapy in Use: No  Activity : Up to commode  Note:   Skin is fragile/bruising at baseline. Waffle cushion in use for offloading pressure.

## 2020-01-25 NOTE — DISCHARGE PLANNING
Received Transport Form @ 9354  Spoke to Fox @ [facility]    Transport is scheduled for 1/25 @1400 going home.    MELYSSA Quezada notified.

## 2020-01-25 NOTE — PROGRESS NOTES
[facility] Medicine Daily Progress Note    Date of Service  1/25/2020    Chief Complaint  89 y.o. male with a history of hypertension admitted 1/19/2020 with shortness of breath.    Hospital Course    History of hypertension and chronic 2 L oxygen dependent respiratory failure admitted with shortness of breath found to have acute on chronic respiratory failure secondary to pulmonary edema, elevated BNP      Interval Problem Update  Breathing stable; weak overall; no new c/o    Consultants/Specialty  None    Code Status  DNR, DNI    Disposition  Patient declined SNF; home with  likely    Review of Systems  Review of Systems   Constitutional: Positive for malaise/fatigue. Negative for chills and fever.   HENT: Negative for sore throat.    Respiratory: Negative for cough (After swallowing) and shortness of breath.    Cardiovascular: Negative for chest pain and palpitations.   Gastrointestinal: Negative for abdominal pain, blood in stool, diarrhea, heartburn, nausea and vomiting.   Genitourinary: Negative for dysuria and frequency.   Musculoskeletal: Negative for back pain and myalgias.   Neurological: Positive for weakness. Negative for dizziness, focal weakness and headaches.   Psychiatric/Behavioral: Negative for depression and hallucinations. The patient is nervous/anxious.    All other systems reviewed and are negative.       Physical Exam  Temp:  [36.5 °C (97.7 °F)-36.8 °C (98.2 °F)] 36.8 °C (98.2 °F)  Pulse:  [80-90] 90  Resp:  [18-22] 22  BP: (102-150)/(53-85) 102/53  SpO2:  [92 %-99 %] 96 %    Physical Exam  Constitutional:       Appearance: Normal appearance.      Comments: Thin   HENT:      Head: Normocephalic and atraumatic.      Nose: Nose normal.      Mouth/Throat:      Mouth: Mucous membranes are moist.   Eyes:      Extraocular Movements: Extraocular movements intact.      Pupils: Pupils are equal, round, and reactive to light.   Neck:      Musculoskeletal: Normal range of motion and neck supple.    Cardiovascular:      Rate and Rhythm: Normal rate and regular rhythm.      Heart sounds: No murmur.   Pulmonary:      Effort: Pulmonary effort is normal. No respiratory distress.      Breath sounds: No stridor.      Comments: Resolving rales  Abdominal:      General: Abdomen is flat. Bowel sounds are normal. There is no distension.      Palpations: Abdomen is soft.      Tenderness: There is no tenderness.   Musculoskeletal:         General: No swelling, tenderness or deformity.      Right lower leg: No edema.      Left lower leg: No edema.      Comments: Resolved edema   Skin:     General: Skin is warm and dry.      Coloration: Skin is not pale.   Neurological:      General: No focal deficit present.      Mental Status: He is alert and oriented to person, place, and time. Mental status is at baseline.   Psychiatric:         Mood and Affect: Mood normal.         Behavior: Behavior normal.         Thought Content: Thought content normal.         Fluids    Intake/Output Summary (Last 24 hours) at 1/25/2020 1155  Last data filed at 1/25/2020 0805  Gross per 24 hour   Intake 360 ml   Output 100 ml   Net 260 ml       Laboratory      Recent Labs     01/23/20  0500 01/24/20  0254   SODIUM 143 142   POTASSIUM 3.3* 3.8   CHLORIDE 95* 94*   CO2 39* 38*   GLUCOSE 94 127*   BUN 22 28*   CREATININE 0.95 0.85   CALCIUM 9.2 9.0                   Imaging  EC-ECHOCARDIOGRAM COMPLETE W/O CONT   Final Result      DX-ESOPHAGUS - [id number]   Final Result      CT-CTA CHEST PULMONARY ARTERY W/ RECONS   Final Result      No evidence of pulmonary embolus.      Peripheral interstitial prominence suggesting pulmonary edema.      Small, right greater than left, pleural effusions.                 Assessment/Plan  * Acute on chronic respiratory failure with hypoxia (HCC)- (present on admission)  Assessment & Plan  Secondary to acute CHF,new onset. Diuresing better with increased lasix dose; change to po lasix as CHF is compensated now  Echo  showed 55% EF, grade 2 diastolic dysfunction and mild AI/MR  Wean oxygen to baseline of 2 L  Home soon with HH vs SNF; son initially enrolled patient with hospice few days ago but changed his mind today      Acute exacerbation of CHF (congestive heart failure) ([facility])- (present on admission)  Assessment & Plan  No hx of CHF in the past, echo noted, very elevated BNP and pulmonary edema on x-ray  Echo showed 55% EF with diastolic dysfunction grade 2  CTA PE neg for PE but shows pleural effusion and pulmonary edema.  Responded well to iv lasix; compensated now, holding lasix for volume contraction.  Strict I/Os; daily weights        Dysphagia- (present on admission)  Assessment & Plan  Noted by speech therapy  Modified barium swallow results noted; regular diet with thin liquids per ST    Macrocytic anemia- (present on admission)  Assessment & Plan  He denies blood loss  Check iron studies    Pain in the chest- (present on admission)  Assessment & Plan  EKG unimpressive for acute ischemia  troponin's mildly elevated at 30, not rising.  No chest pain at this time.  Follow-up echocardiogram  Aspirin    COPD (chronic obstructive pulmonary disease) ([facility])- (present on admission)  Assessment & Plan  Baseline 2L at home, quit smoking 20 years ago  At this time no evidence of exacerbation  Continue RT protocol.       Weakness- (present on admission)  Assessment & Plan  Acute on chronic; can walk with walker for short distance at home; weaker now due to acute illnesses, advised by PT/OT to transfer to SNF but patient declined.  Lives with son.  Probably dc home with  soon.    HTN (hypertension)- (present on admission)  Assessment & Plan  Resume home dose of enalapril        VTE prophylaxis: Lovenox

## 2020-01-25 NOTE — PROGRESS NOTES
Telemetry Shift Summary    Rhythm SR  HR Range 74-93  Ectopy oPAC, rPVC  Measurements 0.16/0.08/0.36        Normal Values  Rhythm SR  HR Range    Measurements 0.12-0.20 / 0.06-0.10  / 0.30-0.52

## 2020-01-25 NOTE — PROGRESS NOTES
Report given to Cheri MCGILL. POC discussed. Pt slept comfortably all throughout the night. Denies any further needs. Fall and safety measures in place.

## 2020-01-25 NOTE — PROGRESS NOTES
1900: Report received from Cheri MCGILL. POC discussed, no further complaints. Will continue to monitor.  1200: Pt resting with eyes closed, unlabored breathing. Fall and safety measures in place.

## 2020-01-26 LAB
ANION GAP SERPL CALC-SCNC: 10 MMOL/L (ref 0–11.9)
BASOPHILS # BLD AUTO: 0.2 % (ref 0–1.8)
BASOPHILS # BLD: 0.02 K/UL (ref 0–0.12)
BUN SERPL-MCNC: 31 MG/DL (ref 8–22)
CALCIUM SERPL-MCNC: 9.1 MG/DL (ref 8.4–10.2)
CHLORIDE SERPL-SCNC: 96 MMOL/L (ref 96–112)
CO2 SERPL-SCNC: 33 MMOL/L (ref 20–33)
CREAT SERPL-MCNC: 0.81 MG/DL (ref 0.5–1.4)
EOSINOPHIL # BLD AUTO: 0.08 K/UL (ref 0–0.51)
EOSINOPHIL NFR BLD: 0.9 % (ref 0–6.9)
ERYTHROCYTE [DISTWIDTH] IN BLOOD BY AUTOMATED COUNT: 49 FL (ref 35.9–50)
GLUCOSE SERPL-MCNC: 115 MG/DL (ref 65–99)
HCT VFR BLD AUTO: 38 % (ref 42–52)
HGB BLD-MCNC: 12.2 G/DL (ref 14–18)
IMM GRANULOCYTES # BLD AUTO: 0.07 K/UL (ref 0–0.11)
IMM GRANULOCYTES NFR BLD AUTO: 0.8 % (ref 0–0.9)
LYMPHOCYTES # BLD AUTO: 1.43 K/UL (ref 1–4.8)
LYMPHOCYTES NFR BLD: 16.4 % (ref 22–41)
MCH RBC QN AUTO: 32.8 PG (ref 27–33)
MCHC RBC AUTO-ENTMCNC: 32.1 G/DL (ref 33.7–35.3)
MCV RBC AUTO: 102.2 FL (ref 81.4–97.8)
MONOCYTES # BLD AUTO: 1.15 K/UL (ref 0–0.85)
MONOCYTES NFR BLD AUTO: 13.2 % (ref 0–13.4)
NEUTROPHILS # BLD AUTO: 5.95 K/UL (ref 1.82–7.42)
NEUTROPHILS NFR BLD: 68.5 % (ref 44–72)
NRBC # BLD AUTO: 0 K/UL
NRBC BLD-RTO: 0 /100 WBC
PLATELET # BLD AUTO: 110 K/UL (ref 164–446)
PMV BLD AUTO: 9.9 FL (ref 9–12.9)
POTASSIUM SERPL-SCNC: 4 MMOL/L (ref 3.6–5.5)
RBC # BLD AUTO: 3.72 M/UL (ref 4.7–6.1)
SODIUM SERPL-SCNC: 139 MMOL/L (ref 135–145)
WBC # BLD AUTO: 8.7 K/UL (ref 4.8–10.8)

## 2020-01-26 PROCEDURE — 80048 BASIC METABOLIC PNL TOTAL CA: CPT

## 2020-01-26 PROCEDURE — 700102 HCHG RX REV CODE 250 W/ 637 OVERRIDE(OP): Performed by: INTERNAL MEDICINE

## 2020-01-26 PROCEDURE — A9270 NON-COVERED ITEM OR SERVICE: HCPCS | Performed by: INTERNAL MEDICINE

## 2020-01-26 PROCEDURE — 770006 HCHG ROOM/CARE - MED/SURG/GYN SEMI*

## 2020-01-26 PROCEDURE — 99232 SBSQ HOSP IP/OBS MODERATE 35: CPT | Performed by: INTERNAL MEDICINE

## 2020-01-26 PROCEDURE — A9270 NON-COVERED ITEM OR SERVICE: HCPCS | Performed by: HOSPITALIST

## 2020-01-26 PROCEDURE — 85025 COMPLETE CBC W/AUTO DIFF WBC: CPT

## 2020-01-26 PROCEDURE — 700102 HCHG RX REV CODE 250 W/ 637 OVERRIDE(OP): Performed by: HOSPITALIST

## 2020-01-26 PROCEDURE — 700111 HCHG RX REV CODE 636 W/ 250 OVERRIDE (IP): Performed by: HOSPITALIST

## 2020-01-26 RX ADMIN — ENOXAPARIN SODIUM 40 MG: 100 INJECTION SUBCUTANEOUS at 05:45

## 2020-01-26 RX ADMIN — ASPIRIN 81 MG: 81 TABLET, COATED ORAL at 05:45

## 2020-01-26 RX ADMIN — POTASSIUM CHLORIDE 20 MEQ: 20 TABLET, EXTENDED RELEASE ORAL at 05:45

## 2020-01-26 RX ADMIN — ENALAPRIL MALEATE 5 MG: 5 TABLET ORAL at 05:45

## 2020-01-26 RX ADMIN — POLYETHYLENE GLYCOL 3350 1 PACKET: 17 POWDER, FOR SOLUTION ORAL at 18:39

## 2020-01-26 ASSESSMENT — ENCOUNTER SYMPTOMS
HEADACHES: 0
BACK PAIN: 0
FOCAL WEAKNESS: 0
FEVER: 0
WEAKNESS: 1
PALPITATIONS: 0
DEPRESSION: 0
SHORTNESS OF BREATH: 0
HEARTBURN: 0
DIZZINESS: 0
NAUSEA: 0
SORE THROAT: 0
HALLUCINATIONS: 0
COUGH: 0
CHILLS: 0
VOMITING: 0
DIARRHEA: 0
BLOOD IN STOOL: 0
ABDOMINAL PAIN: 0
NERVOUS/ANXIOUS: 1
MYALGIAS: 0

## 2020-01-26 NOTE — PROGRESS NOTES
Telemetry Shift Summary    Rhythm SR  HR Range 70s-80s  Ectopy occasional PVC, rare PAC/trigeminy/couplets  Measurements 0.16/0.08/0.36        Normal Values  Rhythm SR  HR Range    Measurements 0.12-0.20 / 0.06-0.10  / 0.30-0.52

## 2020-01-26 NOTE — PROGRESS NOTES
Bedside report given to Chloé MCGILL. POC discussed. Pt resting comfortably in bed. Safety precautions in place.

## 2020-01-26 NOTE — PROGRESS NOTES
[facility] Medicine Daily Progress Note    Date of Service  1/26/2020    Chief Complaint  89 y.o. male with a history of hypertension admitted 1/19/2020 with shortness of breath.    Hospital Course    History of hypertension and chronic 2 L oxygen dependent respiratory failure admitted with shortness of breath found to have acute on chronic respiratory failure secondary to pulmonary edema, elevated BNP      Interval Problem Update  No new c/o; not eating well due to poor appetite; no sob/chest pain    Consultants/Specialty  None    Code Status  DNR, DNI    Disposition  Patient declined SNF; home with HH likely    Review of Systems  Review of Systems   Constitutional: Positive for malaise/fatigue. Negative for chills and fever.   HENT: Negative for sore throat.    Respiratory: Negative for cough (After swallowing) and shortness of breath.    Cardiovascular: Negative for chest pain and palpitations.   Gastrointestinal: Negative for abdominal pain, blood in stool, diarrhea, heartburn, nausea and vomiting.   Genitourinary: Negative for dysuria and frequency.   Musculoskeletal: Negative for back pain and myalgias.   Neurological: Positive for weakness. Negative for dizziness, focal weakness and headaches.   Psychiatric/Behavioral: Negative for depression and hallucinations. The patient is nervous/anxious.    All other systems reviewed and are negative.       Physical Exam  Temp:  [36.4 °C (97.5 °F)-36.8 °C (98.3 °F)] 36.8 °C (98.3 °F)  Pulse:  [73-87] 76  Resp:  [18-24] 18  BP: (122-132)/(51-78) 129/71  SpO2:  [94 %-100 %] 95 %    Physical Exam  Constitutional:       Appearance: Normal appearance.      Comments: Thin   HENT:      Head: Normocephalic and atraumatic.      Nose: Nose normal.      Mouth/Throat:      Mouth: Mucous membranes are moist.   Eyes:      Extraocular Movements: Extraocular movements intact.      Pupils: Pupils are equal, round, and reactive to light.   Neck:      Musculoskeletal: Normal range of motion  and neck supple.   Cardiovascular:      Rate and Rhythm: Normal rate and regular rhythm.      Heart sounds: No murmur.   Pulmonary:      Effort: Pulmonary effort is normal. No respiratory distress.      Breath sounds: No stridor.      Comments: Resolving rales  Abdominal:      General: Abdomen is flat. Bowel sounds are normal. There is no distension.      Palpations: Abdomen is soft.      Tenderness: There is no tenderness.   Musculoskeletal:         General: No swelling, tenderness or deformity.      Right lower leg: No edema.      Left lower leg: No edema.      Comments: Resolved edema   Skin:     General: Skin is warm and dry.      Coloration: Skin is not pale.   Neurological:      General: No focal deficit present.      Mental Status: He is alert and oriented to person, place, and time. Mental status is at baseline.   Psychiatric:         Mood and Affect: Mood normal.         Behavior: Behavior normal.         Thought Content: Thought content normal.         Fluids    Intake/Output Summary (Last 24 hours) at 1/26/2020 0946  Last data filed at 1/25/2020 1521  Gross per 24 hour   Intake 240 ml   Output 175 ml   Net 65 ml       Laboratory  Recent Labs     01/26/20  0403   WBC 8.7   RBC 3.72*   HEMOGLOBIN 12.2*   HEMATOCRIT 38.0*   .2*   MCH 32.8   MCHC 32.1*   RDW 49.0   PLATELETCT 110*   MPV 9.9     Recent Labs     01/24/20  0254 01/26/20  0403   SODIUM 142 139   POTASSIUM 3.8 4.0   CHLORIDE 94* 96   CO2 38* 33   GLUCOSE 127* 115*   BUN 28* 31*   CREATININE 0.85 0.81   CALCIUM 9.0 9.1                   Imaging  EC-ECHOCARDIOGRAM COMPLETE W/O CONT   Final Result      DX-ESOPHAGUS - [id number]   Final Result      CT-CTA CHEST PULMONARY ARTERY W/ RECONS   Final Result      No evidence of pulmonary embolus.      Peripheral interstitial prominence suggesting pulmonary edema.      Small, right greater than left, pleural effusions.                 Assessment/Plan  * Acute on chronic respiratory failure with  hypoxia (HCC)- (present on admission)  Assessment & Plan  Secondary to acute CHF,new onset. Diuresing better with increased lasix dose; lasix held due to volume contraction; CHF is compensated now  Echo showed 55% EF, grade 2 diastolic dysfunction and mild AI/MR  Wean oxygen to baseline of 2 L  Home soon with HH vs SNF; son initially enrolled patient with hospice few days ago but changed his mind 1/25      Acute exacerbation of CHF (congestive heart failure) ([facility])- (present on admission)  Assessment & Plan  No hx of CHF in the past, echo noted, very elevated BNP and pulmonary edema on x-ray  Echo showed 55% EF with diastolic dysfunction grade 2  CTA PE neg for PE but shows pleural effusion and pulmonary edema.  Responded well to iv lasix; compensated now, holding lasix for volume contraction.  Strict I/Os; daily weights        Dysphagia- (present on admission)  Assessment & Plan  Noted by speech therapy  Modified barium swallow results noted; regular diet with thin liquids per ST    Macrocytic anemia- (present on admission)  Assessment & Plan  He denies blood loss  Check iron studies    Pain in the chest- (present on admission)  Assessment & Plan  EKG unimpressive for acute ischemia  troponin's mildly elevated at 30, not rising.  No chest pain at this time.  Follow-up echocardiogram  Aspirin    COPD (chronic obstructive pulmonary disease) ([facility])- (present on admission)  Assessment & Plan  Baseline 2L at home, quit smoking 20 years ago  At this time no evidence of exacerbation  Continue RT protocol.       Weakness- (present on admission)  Assessment & Plan  Acute on chronic; can walk with walker for short distance at home; weaker now due to acute illnesses, advised by PT/OT to transfer to SNF but patient declined.  Lives with son.  Probably dc home with  soon.    HTN (hypertension)- (present on admission)  Assessment & Plan  Resume home dose of enalapril        VTE prophylaxis: Lovenox

## 2020-01-26 NOTE — PROGRESS NOTES
Telemetry Shift Summary    Rhythm SR  HR Range 75-89  Ectopy oPAC, oPVC, rCoup  Measurements 0.18/0.08/0.36        Normal Values  Rhythm SR  HR Range    Measurements 0.12-0.20 / 0.06-0.10  / 0.30-0.52

## 2020-01-26 NOTE — PROGRESS NOTES
Patients linens and gown wet, patient helped to clean up and reposition. Heat pack applied to shoulder, no other needs at this time.

## 2020-01-26 NOTE — PROGRESS NOTES
Bedside report received from Cheri MCGILL. Assumed care. POC discussed. Pt resting comfortably in bed. Safety precautions in place.

## 2020-01-26 NOTE — CARE PLAN
Problem: Safety  Goal: Will remain free from injury  Outcome: PROGRESSING AS EXPECTED  Note:   Pt call light and belongings with in reach, bed in locked and low position, treaded socks on, bed rails up x2, bed alarm  in place       Problem: Skin Integrity  Goal: Risk for impaired skin integrity will decrease  Outcome: PROGRESSING AS EXPECTED  Note:   Patient turned q2 hours, mepilex in place, linens changed as necessary, patient kept dry throughout shift.

## 2020-01-27 ENCOUNTER — HOME CARE VISIT (OUTPATIENT)
Dept: HOSPICE | Facility: HOSPICE | Age: 85
End: 2020-01-27
Payer: MEDICARE

## 2020-01-27 PROCEDURE — A9270 NON-COVERED ITEM OR SERVICE: HCPCS | Performed by: INTERNAL MEDICINE

## 2020-01-27 PROCEDURE — 97116 GAIT TRAINING THERAPY: CPT

## 2020-01-27 PROCEDURE — 700102 HCHG RX REV CODE 250 W/ 637 OVERRIDE(OP): Performed by: HOSPITALIST

## 2020-01-27 PROCEDURE — A9270 NON-COVERED ITEM OR SERVICE: HCPCS | Performed by: HOSPITALIST

## 2020-01-27 PROCEDURE — 770006 HCHG ROOM/CARE - MED/SURG/GYN SEMI*

## 2020-01-27 PROCEDURE — 700102 HCHG RX REV CODE 250 W/ 637 OVERRIDE(OP): Performed by: INTERNAL MEDICINE

## 2020-01-27 PROCEDURE — 700101 HCHG RX REV CODE 250: Performed by: INTERNAL MEDICINE

## 2020-01-27 PROCEDURE — 700111 HCHG RX REV CODE 636 W/ 250 OVERRIDE (IP): Performed by: HOSPITALIST

## 2020-01-27 PROCEDURE — 99232 SBSQ HOSP IP/OBS MODERATE 35: CPT | Performed by: INTERNAL MEDICINE

## 2020-01-27 PROCEDURE — 97530 THERAPEUTIC ACTIVITIES: CPT

## 2020-01-27 RX ORDER — ENALAPRIL MALEATE 5 MG/1
5 TABLET ORAL DAILY
Qty: 30 TAB | Refills: 0 | Status: SHIPPED | OUTPATIENT
Start: 2020-01-27

## 2020-01-27 RX ORDER — ASPIRIN 81 MG/1
81 TABLET ORAL DAILY
Qty: 30 TAB | Refills: 0 | Status: SHIPPED | OUTPATIENT
Start: 2020-01-28 | End: 2020-02-22

## 2020-01-27 RX ORDER — ASPIRIN 81 MG/1
81 TABLET ORAL DAILY
Qty: 30 TAB
Start: 2020-01-28 | End: 2020-01-27

## 2020-01-27 RX ORDER — ACETAMINOPHEN 500 MG
500 TABLET ORAL EVERY 6 HOURS PRN
Status: DISCONTINUED | OUTPATIENT
Start: 2020-01-27 | End: 2020-01-29 | Stop reason: HOSPADM

## 2020-01-27 RX ORDER — AMOXICILLIN 250 MG
1 CAPSULE ORAL DAILY
Qty: 30 TAB | Refills: 0 | Status: SHIPPED | OUTPATIENT
Start: 2020-01-27 | End: 2020-02-22

## 2020-01-27 RX ORDER — AMOXICILLIN 250 MG
2 CAPSULE ORAL 2 TIMES DAILY
Qty: 30 TAB | Refills: 0
Start: 2020-01-27 | End: 2020-01-27

## 2020-01-27 RX ORDER — LIDOCAINE 50 MG/G
1 PATCH TOPICAL EVERY 24 HOURS
Status: DISCONTINUED | OUTPATIENT
Start: 2020-01-27 | End: 2020-01-29 | Stop reason: HOSPADM

## 2020-01-27 RX ADMIN — ENOXAPARIN SODIUM 40 MG: 100 INJECTION SUBCUTANEOUS at 05:48

## 2020-01-27 RX ADMIN — SENNOSIDES AND DOCUSATE SODIUM 2 TABLET: 8.6; 5 TABLET ORAL at 17:51

## 2020-01-27 RX ADMIN — ENALAPRIL MALEATE 5 MG: 5 TABLET ORAL at 05:48

## 2020-01-27 RX ADMIN — POTASSIUM CHLORIDE 20 MEQ: 20 TABLET, EXTENDED RELEASE ORAL at 05:48

## 2020-01-27 RX ADMIN — ASPIRIN 81 MG: 81 TABLET, COATED ORAL at 05:48

## 2020-01-27 RX ADMIN — POLYETHYLENE GLYCOL 3350 1 PACKET: 17 POWDER, FOR SOLUTION ORAL at 05:49

## 2020-01-27 RX ADMIN — LIDOCAINE 1 PATCH: 50 PATCH TOPICAL at 22:09

## 2020-01-27 RX ADMIN — ACETAMINOPHEN 500 MG: 500 TABLET, FILM COATED ORAL at 22:09

## 2020-01-27 RX ADMIN — POLYETHYLENE GLYCOL 3350 1 PACKET: 17 POWDER, FOR SOLUTION ORAL at 15:18

## 2020-01-27 ASSESSMENT — COGNITIVE AND FUNCTIONAL STATUS - GENERAL
MOVING TO AND FROM BED TO CHAIR: A LITTLE
MOVING FROM LYING ON BACK TO SITTING ON SIDE OF FLAT BED: A LITTLE
CLIMB 3 TO 5 STEPS WITH RAILING: TOTAL
TURNING FROM BACK TO SIDE WHILE IN FLAT BAD: A LITTLE
STANDING UP FROM CHAIR USING ARMS: A LITTLE
WALKING IN HOSPITAL ROOM: A LOT
MOBILITY SCORE: 15
SUGGESTED CMS G CODE MODIFIER MOBILITY: CK

## 2020-01-27 ASSESSMENT — GAIT ASSESSMENTS
DEVIATION: STEP TO;DECREASED BASE OF SUPPORT;BRADYKINETIC
DISTANCE (FEET): 45
ASSISTIVE DEVICE: FRONT WHEEL WALKER
GAIT LEVEL OF ASSIST: MODERATE ASSIST

## 2020-01-27 NOTE — PROGRESS NOTES
Bedside report received from Chloé MCGILL. Assumed care. POC discussed. Pt resting comfortably in bed. Safety precautions in place.

## 2020-01-27 NOTE — FACE TO FACE
Face to Face Supporting Documentation - Home Health    The encounter with this patient was in whole or in part the primary reason for home health admission.    Date of encounter:   Patient:                    MRN:                       YOB: 2020  Jhon Begum  [id number]  6/20/1927     Home health to see patient for:  Skilled Nursing care for assessment, interventions & education    Skilled need for:  Exacerbation of Chronic Disease State acute on chronic resp failure    Skilled nursing interventions to include:  Comment: PT/OT    Homebound status evidenced by:  Needs the assistance of another person in order to leave the home. Leaving home requires a considerable and taxing effort. There is a normal inability to leave the home.    Community Physician to provide follow up care: Ihsan Monet M.D.     Optional Interventions? No      I certify the face to face encounter for this home health care referral meets the CMS requirements and the encounter/clinical assessment with the patient was, in whole, or in part, for the medical condition(s) listed above, which is the primary reason for home health care. Based on my clinical findings: the service(s) are medically necessary, support the need for home health care, and the homebound criteria are met.  I certify that this patient has had a face to face encounter by myself.  Turong Vickers M.D. - NPI: [id number]

## 2020-01-27 NOTE — PROGRESS NOTES
Bedside report given to Tony MCGILL. POC discussed. Pt resting comfortably in bed. Safety precautions in place.

## 2020-01-27 NOTE — DISCHARGE PLANNING
Received Choice form at [phone]  Agency/Facility Name: [facility]  Referral sent per Choice form @ [phone]

## 2020-01-27 NOTE — DISCHARGE PLANNING
Anticipated Discharge Disposition: Home with [facility]    Action: LSW spoke with son Moi who would like pt to go home with his brother and [facility]. Moi is also looking into hiring a private care giver.     LSW faxed choice form to [facility]    Barriers to Discharge: [facility] acceptance    Plan: LSW to f/u

## 2020-01-27 NOTE — CARE PLAN
Problem: Venous Thromboembolism (VTW)/Deep Vein Thrombosis (DVT) Prevention:  Goal: Patient will participate in Venous Thrombosis (VTE)/Deep Vein Thrombosis (DVT)Prevention Measures  Outcome: PROGRESSING AS EXPECTED  Flowsheets (Taken 1/26/2020 1932)  Pharmacologic Prophylaxis Used: LMWH: Enoxaparin(Lovenox)     Problem: Pain Management  Goal: Pain level will decrease to patient's comfort goal  Outcome: PROGRESSING AS EXPECTED  Note:   Patient has no c/o pain throughout shift, will continue to monitor.

## 2020-01-27 NOTE — DOCUMENTATION QUERY
[facility]                                                                       Query Response Note      PATIENT:               YANELI MEZA  ACCT #:                  [id number]  MRN:                     [id number]  :                      1927  ADMIT DATE:       2020 3:13 PM  DISCH DATE:          RESPONDING  PROVIDER #:        [id number]           QUERY TEXT:    Congestive Heart Failure is documented in the Medical Record. Please document the type and acuity (includes probable or suspected).     NOTE:  If an appropriate response is not listed below, please respond with a new note.    The patient's Clinical Indicators include:  New onset acute exacerbation CHF is documented in the H&P and  progress notes.  Findings:  New ECHO shows EF 55% w/grade II diastolic dysfunction  Trop T 30  NT proBNP 58586  small pleural effusion R>L  pulmonary edema  Treatment:  Lasix   lisinopril   strict I&O  Risk:  Pt age admitted w/acute respiratory failure from new CHF  Options provided:   -- Acute Systolic heart failure   -- Chronic Systolic heart failure   -- Acute on Chronic Systolic heart failure   -- Acute Diastolic heart failure   -- Chronic Diastolic heart failure   -- Acute on Chronic Diastolic heart failure   -- Acute Systolic and Diastolic heart failure   -- Chronic Systolic and Diastolic heart failure   -- Acute on Chronic Systolic and diastolic heart failure   -- Unable to determine      Query created by: Tika Cheng on 2020 3:39 PM    RESPONSE TEXT:    Acute Diastolic heart failure          Electronically signed by:  NICOLETTE AJ MD 2020 2:29 PM

## 2020-01-27 NOTE — DISCHARGE SUMMARY
"Discharge Summary    CHIEF COMPLAINT ON ADMISSION  Chief Complaint   Patient presents with   • Chest Pain     intermittent central CP x \" a few weeks but its getting worse\". 8/10 pain when present   • Shortness of Breath     Pt was in 80's on RA at home.        Reason for Admission  Chest pain     Admission Date  1/19/2020    CODE STATUS  DNAR/DNI    HPI & HOSPITAL COURSE  This is a 89 y.o. male here with past medical history of COPD, baseline on 2 L of oxygen, he says he uses it when he needs it,  Hx of HTN presented to the emergency room on 1/19/2020 chest pain shortness of breath which started several weeks ago and has been progressively worsening in terms of duration and intensity.  In terms of his chest pain patient says that discomfort is located around his left side of his chest, 8 out of 10 in severity, pressure-like sensation exacerbated by exertion and relieved by rest.  He also says he is short of breath and becomes more short of breath especially during exertion or ambulation.  Patient said last year he had a similar feeling at that time he underwent several diagnostics including a CT PE study which was negative, and an echocardiogram which showed a preserved LVEF function.  At this point patient denies having any active chest pain   Patient was found to have pulm edema and started on diuresis with IV lasix.  He diuresed well and breathing is back to baseline at time of discharge, requiring O2 2 L chronically.  He had no more pedal edema.  There was e/o metabolic alkalosis due to volume contraction with diuresis and lasix was held.  Echo showed:  Compared to the images of the study done 5/22/19 - there has been   improvement of aortic insufficiency.   Normal left ventricular chamber size.  Left ventricular ejection fraction is visually estimated to be 55%.  Grade II diastolic dysfunction.  Mild mitral regurgitation.  Mild aortic insufficiency.  Right ventricular systolic pressure is estimated to be 35 " mmHg.  Normal inferior vena cava size and inspiratory collapse.     Patient was consulted by palliative care and hospice and given his current conditions and prognosis, his son agreed to enroll patient with hospice initially but changed his mind a few days later.  At this time, son wants to take patient home with .      Therefore, he is discharged in fair and stable condition to home with close outpatient follow-up.    The patient met 2-midnight criteria for an inpatient stay at the time of discharge.    Discharge Date  1/27/20    FOLLOW UP ITEMS POST DISCHARGE  pcp next week    DISCHARGE DIAGNOSES  Principal Problem:    Acute on chronic respiratory failure with hypoxia (HCC) POA: Yes  Active Problems:    Acute exacerbation of CHF (congestive heart failure) ([facility]) POA: Yes    HTN (hypertension) POA: Yes    Weakness POA: Yes    COPD (chronic obstructive pulmonary disease) ([facility]) POA: Yes    Pain in the chest POA: Yes    Macrocytic anemia POA: Yes    Dysphagia POA: Yes  Resolved Problems:    * No resolved hospital problems. *      FOLLOW UP  No future appointments.  [facility]  [address] [id number] [phone]  Schedule an appointment as soon as possible for a visit  call to schedule for sleep study    Ihsan Monet M.D.  [address] [address]  [phone]    On 2/3/2020  Please arrive at 1:30pm for your hospital follow up. Thank you.     hospice          with snf physicians          Ihsan Monet M.D.  [address] [address]  [phone]    In 1 week        MEDICATIONS ON DISCHARGE     Medication List      START taking these medications      Instructions   aspirin 81 MG EC tablet  Start taking on:  January 28, 2020   Take 1 Tab by mouth every day.  Dose:  81 mg     senna-docusate 8.6-50 MG Tabs  Commonly known as:  PERICOLACE or SENOKOT S   Take 1 Tab by mouth every day.  Dose:  1 Tab        CHANGE how you  take these medications      Instructions   enalapril 5 MG Tabs  What changed:    · medication strength  · how much to take  Commonly known as:  VASOTEC   Take 1 Tab by mouth every day.  Dose:  5 mg            Allergies  No Known Allergies    DIET  Orders Placed This Encounter   Procedures   • Diet Order Regular (Upright 90*. Monitor with meals.)     Standing Status:   Standing     Number of Occurrences:   1     Order Specific Question:   Diet:     Answer:   Regular [1]     Comments:   Upright 90*. Monitor with meals.     Order Specific Question:   Texture/Fiber modifications:     Answer:   Dysphagia 3(Mechanical Soft)specify fluid consistency(question 6) [3]     Comments:   chopped food     Order Specific Question:   Consistency/Fluid modifications:     Answer:   Thin Liquids [3]       ACTIVITY  As tolerated.  Weight bearing as tolerated    CONSULTATIONS  Hospice/palliative care    PROCEDURES  none    LABORATORY  Lab Results   Component Value Date    SODIUM 139 01/26/2020    POTASSIUM 4.0 01/26/2020    CHLORIDE 96 01/26/2020    CO2 33 01/26/2020    GLUCOSE 115 (H) 01/26/2020    BUN 31 (H) 01/26/2020    CREATININE 0.81 01/26/2020    CREATININE 1.2 03/25/2008        Lab Results   Component Value Date    WBC 8.7 01/26/2020    HEMOGLOBIN 12.2 (L) 01/26/2020    HEMATOCRIT 38.0 (L) 01/26/2020    PLATELETCT 110 (L) 01/26/2020        Total time of the discharge process exceeds 35 minutes.

## 2020-01-27 NOTE — DISCHARGE PLANNING
LSW spoke with son Moi about POC. Moi stated that he thinks pt may benefit from SNF but was unsure between [facility] and [facility]. Moi also concerned that if pt went home with [facility] pt would not have all the care he needs. Moi wanted to know if [facility] would bath pt and LSW reminded him they do not and that they would need a private care giver as discussed before. LSW discussed with Moi that SNF might be a good bridge between hospital and home and Moi agreed. LSW stated she would f/u on referrals and let him know. Moi then thought pt might be better at home. LSW again stated that she would see what the SNFs say and discuss it with him later on today.

## 2020-01-27 NOTE — DIETARY
Nutrition Services: Update   Day 8 of admit.  Jhon Begum is a 89 y.o. male with admitting DX of Chest pain, Acute hypoxemic respiratory failure, CHF exacerbation.    Pt is currently on regular diet with thin liquids. He is also receiving Boost supplements TID with meals.  Recorded PO intake of his meals is 33% on average. Per CNA, pt ate well this morning when his food was chopped up. He did not do as well at lunch when he had sliders. CNA also requested small portions. Dietary aware of request for chopped foods and small portions. Pt is drinking all of his Boost supplements per CNA. This is providing an additional 1080 kcals and 42 gms protein.      Wt 1/25/20: 56.1 kg via bed scale - Weight has been stable.     Pt's last recorded BM was 1/23/20. Pt with order for senna-docusate but he is refusing this.    Labs: 1/26/20: glucose=115 (H), BUN=31 (H and trending up)    Malnutrition Risk: Criteria not met    Recommendations/Plan:  1. Continue with diet and Boost Plus as ordered.   2. Cut up foods on meal tray and provide small portions.    3. Encourage intake of meals  4. Document intake of all meals as % taken in ADL's to provide interdisciplinary communication across all shifts.   5. Monitor weight.  6. Nutrition rep will continue to see patient for ongoing meal and snack preferences.    RD following

## 2020-01-27 NOTE — PROGRESS NOTES
Patient found to be wet, linens and gown changed. Patient helped to reposition. No other needs at this time.

## 2020-01-27 NOTE — DISCHARGE PLANNING
Thank you for sending [facility] this referral,  This referral is under review. We will have our Nurse liaison see patient tomorrow so we can make a determination.

## 2020-01-27 NOTE — PROGRESS NOTES
Tele monitor tech reported accelerated junctional rhythm. This writer notified hospitalist. No order received. BP stable, HR in the 70's. No acute distress.

## 2020-01-27 NOTE — PROGRESS NOTES
Assessment completed, patient A&Ox4, no c/o pain. Per day RN patient had not urinated since morning, patient bladder scanned, bladder scan at 245ml, will continue to monitor. No other needs at this time.

## 2020-01-27 NOTE — CARE PLAN
Problem: Knowledge Deficit  Goal: Knowledge of disease process/condition, treatment plan, diagnostic tests, and medications will improve  Outcome: PROGRESSING AS EXPECTED     Problem: Urinary Elimination:  Goal: Ability to reestablish a normal urinary elimination pattern will improve  Outcome: PROGRESSING AS EXPECTED     Problem: Mobility  Goal: Risk for activity intolerance will decrease  Outcome: PROGRESSING AS EXPECTED     Problem: Pain Management  Goal: Pain level will decrease to patient's comfort goal  Outcome: PROGRESSING AS EXPECTED

## 2020-01-27 NOTE — THERAPY
"Physical Therapy Treatment completed.   Bed Mobility:  Supine to Sit: Minimal Assist  Transfers: Sit to Stand: Minimal Assist  Gait: Level Of Assist: Moderate Assist with Front-Wheel Walker       Plan of Care: Will benefit from Physical Therapy 3 times per week  Discharge Recommendations: Equipment: Front-Wheel Walker. Recommend post-acute placement for continued physical therapy services prior to discharge home. Patient can tolerate post-acute therapies at a 5x/week frequency.       See \"Rehab Therapy-Acute\" Patient Summary Report for complete documentation.     Pt is progressing gradually with functional mobility. Pt was able to demonstrate improvement in ambulation distance and activity tolernace today. Pt continues to be limited due to poor gait mechanics, balance, weakness, and fatigue. Pt demonstrates with narrow SEBLE, scissoring gait mechanics, and poor foot placement during ambulation. Pt required seated rest break during ambulation and tends to heavily lean on FWW when fatigued. Pt continues to be a high falls risk at this time and is unable to demonstrate functional activity tolerance and ambulation for d/c home. Pt will continue to benefit from skilled PT while in house, with recommendation for post acute therapy services prior to d/c home.   "

## 2020-01-27 NOTE — PROGRESS NOTES
Patient found to be off oxygen, placed patient back on O2, sattin gin 90s. Patient reports he is comfortable, patient left to rest no other needs at this time.

## 2020-01-28 ENCOUNTER — HOME HEALTH ADMISSION (OUTPATIENT)
Dept: HOME HEALTH SERVICES | Facility: HOME HEALTHCARE | Age: 85
End: 2020-01-28
Payer: MEDICARE

## 2020-01-28 PROCEDURE — 700111 HCHG RX REV CODE 636 W/ 250 OVERRIDE (IP): Performed by: HOSPITALIST

## 2020-01-28 PROCEDURE — 700102 HCHG RX REV CODE 250 W/ 637 OVERRIDE(OP): Performed by: INTERNAL MEDICINE

## 2020-01-28 PROCEDURE — A9270 NON-COVERED ITEM OR SERVICE: HCPCS | Performed by: HOSPITALIST

## 2020-01-28 PROCEDURE — 700101 HCHG RX REV CODE 250: Performed by: INTERNAL MEDICINE

## 2020-01-28 PROCEDURE — 700102 HCHG RX REV CODE 250 W/ 637 OVERRIDE(OP): Performed by: HOSPITALIST

## 2020-01-28 PROCEDURE — 92526 ORAL FUNCTION THERAPY: CPT

## 2020-01-28 PROCEDURE — 770006 HCHG ROOM/CARE - MED/SURG/GYN SEMI*

## 2020-01-28 PROCEDURE — A9270 NON-COVERED ITEM OR SERVICE: HCPCS | Performed by: INTERNAL MEDICINE

## 2020-01-28 PROCEDURE — 99232 SBSQ HOSP IP/OBS MODERATE 35: CPT | Performed by: INTERNAL MEDICINE

## 2020-01-28 RX ORDER — ACETAMINOPHEN 500 MG
500 TABLET ORAL EVERY 6 HOURS PRN
Qty: 30 TAB | Refills: 0 | Status: SHIPPED | OUTPATIENT
Start: 2020-01-28 | End: 2020-02-22

## 2020-01-28 RX ADMIN — ENOXAPARIN SODIUM 40 MG: 100 INJECTION SUBCUTANEOUS at 06:19

## 2020-01-28 RX ADMIN — POTASSIUM CHLORIDE 20 MEQ: 20 TABLET, EXTENDED RELEASE ORAL at 06:19

## 2020-01-28 RX ADMIN — ACETAMINOPHEN 500 MG: 500 TABLET, FILM COATED ORAL at 06:19

## 2020-01-28 RX ADMIN — ASPIRIN 81 MG: 81 TABLET, COATED ORAL at 06:19

## 2020-01-28 RX ADMIN — LIDOCAINE 1 PATCH: 50 PATCH TOPICAL at 22:13

## 2020-01-28 RX ADMIN — SENNOSIDES AND DOCUSATE SODIUM 2 TABLET: 8.6; 5 TABLET ORAL at 06:19

## 2020-01-28 RX ADMIN — ENALAPRIL MALEATE 5 MG: 5 TABLET ORAL at 06:19

## 2020-01-28 ASSESSMENT — PATIENT HEALTH QUESTIONNAIRE - PHQ9
2. FEELING DOWN, DEPRESSED, IRRITABLE, OR HOPELESS: NOT AT ALL
SUM OF ALL RESPONSES TO PHQ9 QUESTIONS 1 AND 2: 0
1. LITTLE INTEREST OR PLEASURE IN DOING THINGS: NOT AT ALL

## 2020-01-28 NOTE — THERAPY
"Speech Language Therapy dysphagia treatment completed.     Functional Status: Pt was seen today for therapeutic feeding session. Per RN, Pt has been tolerating current diet of D3/thin textures. Upon arrival to Pt's room, Pt was found reclined in bed. Pt was agreeable to sit fully upright and participate with therapy objectives. Pt's lunch consisted of mashed potatoes, chopped chicken, peaches and chocolate Boost.     Pt demonstrated appropriate feeding rate and bolus size independently. No coughing, choking and/or other clinical s/sx of aspiration/penetration was noted with all meal items. Pt with clean/clear oral cavity and denied globus sensation. Per recent MBS completed by this provider, Pt is judged safe for Regular diet; however, will recommend to con't current diet of Dysphagia 3 solids with thin liquids (per Pt preference/tolerance). Recommend con't adherence to safe swallow precautions. RN notified and aware. No further inpatient skilled SLP services appear warranted at this time; however, SLP will remain available for dysphagia education PRN. Thank you.    Recommendations: Con't current diet of Dysphagia 3 solids with thin liquids (per Pt preference/tolerance). Recommend con't adherence to safe swallow precautions     Plan of Care: Patient is currently not being actively followed for therapy services at this time, however may be seen if requested by attending provider for 1 more visit within 30 days to address any discharge needs or if the patient has a change in status.      Post-Acute Therapy: Anticipate that the patient will have no further speech therapy needs after discharge from the hospital.    See \"Rehab Therapy-Acute\" Patient Summary Report for complete documentation.     "

## 2020-01-28 NOTE — DISCHARGE PLANNING
Clinical liaison is coming today to evaluate patient and discuss Home Health versus Hospice.  Thank you.

## 2020-01-28 NOTE — DISCHARGE PLANNING
ATTN: Case Management  RE: Referral for Home Health    As of 01/28/2020, we have accepted the Home Health referral for the patient listed above.    A [facility] clinician will be out to see the patient within 48 hours. If you have any questions or concerns regarding the patient's transition to Home Health, please do not hesitate to contact us at [phone].      We look forward to collaborating with you,  [facility] Home Health Team

## 2020-01-28 NOTE — PROGRESS NOTES
Received report from NOC RN; assumed pt care. Pt resting comfortably in bed. No needs at this time. Will continue to monitor.

## 2020-01-28 NOTE — DISCHARGE SUMMARY
"Discharge Summary    CHIEF COMPLAINT ON ADMISSION  Chief Complaint   Patient presents with   • Chest Pain     intermittent central CP x \" a few weeks but its getting worse\". 8/10 pain when present   • Shortness of Breath     Pt was in 80's on RA at home.        Reason for Admission  Chest pain     Admission Date  1/19/2020    CODE STATUS  DNAR/DNI    HPI & HOSPITAL COURSE  This is a 89 y.o. male here with past medical history of COPD, with chronic 2 L PRN oxygen dependent respiratory failure, HTN presented to the emergency room on 1/19/2020 chest pain shortness of breath.  He was found to have what appeared to be aspiration pneumonia.  He was requiring 2 to 4 L of oxygen.  He had previous CTA which was negative for PE.  Echocardiogram prior to this presentation had showed a normal ejection fraction.  Patient was found to have pulm edema and was started on diuresis with IV lasix.  He diuresed well and his breathing breathing returned back to baseline at time of discharge, requiring O2 2 L chronically.  He had no more pedal edema.  There was e/o metabolic alkalosis due to volume contraction with diuresis and lasix was held.  Echo showed:  Compared to the images of the study done 5/22/19 - there has been   improvement of aortic insufficiency.   Left ventricular ejection fraction is visually estimated to be 55%.  Grade II diastolic dysfunction.  Mild mitral regurgitation.  Mild aortic insufficiency.  Right ventricular systolic pressure is estimated to be 35 mmHg.     He was seen by speech and after their evaluation they recommended a mechanical soft diet due to reduce the risk of aspiration.    Patient was consulted by palliative care and hospice and given his current conditions and prognosis, his son agreed to enroll patient with hospice initially but changed his mind a few days later.  At this time, son wants to take patient home with .      Therefore, he is discharged in fair and stable condition to home with close " outpatient follow-up.    The patient met 2-midnight criteria for an inpatient stay at the time of discharge.    Discharge Date  1/29 /20    FOLLOW UP ITEMS POST DISCHARGE  pcp next week    DISCHARGE DIAGNOSES  Principal Problem:    Acute on chronic respiratory failure with hypoxia (HCC) POA: Yes  Active Problems:    Acute exacerbation of CHF (congestive heart failure) ([facility]) POA: Yes    HTN (hypertension) POA: Yes    Weakness POA: Yes    COPD (chronic obstructive pulmonary disease) ([facility]) POA: Yes    Pain in the chest POA: Yes    Macrocytic anemia POA: Yes    Dysphagia POA: Yes  Resolved Problems:    * No resolved hospital problems. *      FOLLOW UP  [facility]  [address] [facility] [address]  [facility] [id number] [phone]  Schedule an appointment as soon as possible for a visit  call to schedule for sleep study    Ihsan Monet M.D.  [address] [address]  [phone]    On 2/3/2020  Please arrive at 1:30pm for your hospital follow up. Thank you.     hospice          with snf physicians          Ihsan Monet M.D.  [address] [address]  [phone]    In 1 week        MEDICATIONS ON DISCHARGE     Medication List      START taking these medications      Instructions   acetaminophen 500 MG Tabs  Commonly known as:  TYLENOL   Take 1 Tab by mouth every 6 hours as needed for Mild Pain.  Dose:  500 mg     aspirin 81 MG EC tablet   Take 1 Tab by mouth every day.  Dose:  81 mg     senna-docusate 8.6-50 MG Tabs  Commonly known as:  PERICOLACE or SENOKOT S   Take 1 Tab by mouth every day.  Dose:  1 Tab        CHANGE how you take these medications      Instructions   enalapril 5 MG Tabs  What changed:    · medication strength  · how much to take  Commonly known as:  VASOTEC   Take 1 Tab by mouth every day.  Dose:  5 mg            Allergies  No Known Allergies    DIET  Orders Placed This Encounter   Procedures   • Diet Order Regular (Upright 90*.  Monitor with meals.)     Standing Status:   Standing     Number of Occurrences:   1     Order Specific Question:   Diet:     Answer:   Regular [1]     Comments:   Upright 90*. Monitor with meals.     Order Specific Question:   Texture/Fiber modifications:     Answer:   Dysphagia 3(Mechanical Soft)specify fluid consistency(question 6) [3]     Comments:   chopped food     Order Specific Question:   Consistency/Fluid modifications:     Answer:   Thin Liquids [3]       ACTIVITY  As tolerated.  Weight bearing as tolerated    CONSULTATIONS  Hospice/palliative care    PROCEDURES  none    LABORATORY  Lab Results   Component Value Date    SODIUM 139 01/26/2020    POTASSIUM 4.0 01/26/2020    CHLORIDE 96 01/26/2020    CO2 33 01/26/2020    GLUCOSE 115 (H) 01/26/2020    BUN 31 (H) 01/26/2020    CREATININE 0.81 01/26/2020    CREATININE 1.2 03/25/2008        Lab Results   Component Value Date    WBC 8.7 01/26/2020    HEMOGLOBIN 12.2 (L) 01/26/2020    HEMATOCRIT 38.0 (L) 01/26/2020    PLATELETCT 110 (L) 01/26/2020        Total time of the discharge process exceeds 38 minutes.

## 2020-01-28 NOTE — PROGRESS NOTES
Pt O2 saturation ranges from 87-94% on room air. Pt does not have oxygen at home, Son anxious about taking his father home w/ no oxygen.  When this RN ambulated w/out oxygen his saturation ranged between 87-90. Per pt he feel SOB w/out O2.

## 2020-01-29 VITALS
HEART RATE: 87 BPM | OXYGEN SATURATION: 95 % | SYSTOLIC BLOOD PRESSURE: 140 MMHG | DIASTOLIC BLOOD PRESSURE: 84 MMHG | HEIGHT: 69 IN | WEIGHT: 116.84 LBS | BODY MASS INDEX: 17.31 KG/M2 | TEMPERATURE: 97.9 F | RESPIRATION RATE: 18 BRPM

## 2020-01-29 PROCEDURE — 700102 HCHG RX REV CODE 250 W/ 637 OVERRIDE(OP): Performed by: INTERNAL MEDICINE

## 2020-01-29 PROCEDURE — A9270 NON-COVERED ITEM OR SERVICE: HCPCS | Performed by: INTERNAL MEDICINE

## 2020-01-29 PROCEDURE — 99239 HOSP IP/OBS DSCHRG MGMT >30: CPT | Performed by: INTERNAL MEDICINE

## 2020-01-29 PROCEDURE — 700111 HCHG RX REV CODE 636 W/ 250 OVERRIDE (IP): Performed by: HOSPITALIST

## 2020-01-29 RX ADMIN — ENALAPRIL MALEATE 5 MG: 5 TABLET ORAL at 05:53

## 2020-01-29 RX ADMIN — POTASSIUM CHLORIDE 20 MEQ: 20 TABLET, EXTENDED RELEASE ORAL at 05:53

## 2020-01-29 RX ADMIN — ACETAMINOPHEN 500 MG: 500 TABLET, FILM COATED ORAL at 08:53

## 2020-01-29 RX ADMIN — ASPIRIN 81 MG: 81 TABLET, COATED ORAL at 05:53

## 2020-01-29 ASSESSMENT — ENCOUNTER SYMPTOMS
HEADACHES: 0
VOMITING: 0
MYALGIAS: 0
COUGH: 0
HALLUCINATIONS: 0
BLOOD IN STOOL: 0
DIARRHEA: 0
HEARTBURN: 0
DEPRESSION: 0
FOCAL WEAKNESS: 0
FEVER: 0
BACK PAIN: 0
DIZZINESS: 0
CHILLS: 0
ABDOMINAL PAIN: 0
NERVOUS/ANXIOUS: 1
PALPITATIONS: 0
WEAKNESS: 1
NAUSEA: 0
SORE THROAT: 0
SHORTNESS OF BREATH: 1

## 2020-01-29 NOTE — CARE PLAN
Problem: Safety  Goal: Will remain free from injury  Outcome: PROGRESSING AS EXPECTED  Note:   Bed locked and in lowest position. Call bell within reach. Bed alarm is on.     Problem: Bowel/Gastric:  Goal: Normal bowel function is maintained or improved  Outcome: PROGRESSING AS EXPECTED  Flowsheets  Taken 1/28/2020 1900 by Dhara Salomon R.N.  Last BM: 01/28/20  Taken 1/28/2020 2154 by MIKE Oliveira.N.  Number of Times Stooled: 1  Note:   Abdomen is soft and non tender on assessment. Bowel sounds present in all quadrants.  Goal: Will not experience complications related to bowel motility  Outcome: PROGRESSING AS EXPECTED  Flowsheets  Taken 1/28/2020 1900 by Dhara Salomon R.N.  Last BM: 01/28/20  Taken 1/28/2020 2154 by Shireen Brito R.N.  Number of Times Stooled: 1  Note:   Abdomen is soft and non tender on assessment. Bowel sounds present in all quadrants.

## 2020-01-29 NOTE — DISCHARGE PLANNING
Agency/Facility Name: [facility]   Spoke To: Saima   Outcome: pt accepted. Portable tank is being delivered to bedside.

## 2020-01-29 NOTE — DISCHARGE PLANNING
Received Choice form at 4290  Agency/Facility Name: [facility]  Referral sent per Choice form @ [phone]

## 2020-01-29 NOTE — DISCHARGE INSTRUCTIONS
Home Health will meet with you on Friday 1/31.     Diet: Regular, Upright 90 degrees, Monitor during meals. Mechanical soft/chopped foods      [facility] Patient Discharge Instructions  · Monitor your weight daily, and maintain a weight chart, to track your weight changes.   · Activity as tolerated, unless your Doctor has ordered otherwise. Other activity order: as tolerated.  · Follow a low fat, low cholesterol, low salt diet unless instructed otherwise by your Doctor. Read the labels on the back of food products and track your intake of fat, cholesterol and salt.   · Fluid Restriction No. If a Fluid Restriction has been ordered by your Doctor, measure fluids with a measuring cup to ensure that you are not exceeding the restriction.   · No smoking.  · Oxygen Yes. If your Doctor has ordered that you wear Oxygen at home, it is important to wear it as ordered.  · Did you receive an explanation from staff on the importance of taking each of your medications and why it is necessary to stay on the medications the physician/care provider has ordered? Yes  · Do you have any questions concerning how to manage your heart failure and what to do should you have any increased signs and symptoms after you go home? No  · Do you feel like your heart failure care team involved you in the care treatment plan and allowed you to make decisions regarding your care while in the hospital and addressed any discharge needs you might have? Yes    See the educational handout provided at discharge for more information on monitoring your daily weight, activity and diet. This also explains more about Heart Failure, symptoms of a flare-up and some of the tests that you have undergone.     Warning Signs of a Flare-Up include:  · Swelling in the ankles or lower legs.  · Shortness of breath, while at rest, or while doing normal activities.   · Shortness of breath at night when in bed, or coughing in bed.   · Requiring more pillows to sleep at night, or  needing to sit up at night to sleep.  · Feeling weak, dizzy or fatigued.     When to call your Doctor:  · Call [facility] about questions regarding the discharge instructions you were given [phone].  (Discharge Unit med tele)  · Call your Primary Care Physician or Cardiologist if:   1. You experience any pain radiating to your jaw or neck.  2. You have any difficulty breathing.  3. You experience weight gain of 2 lbs in a day or 5 lbs in a week.   4. You feel any palpitations or irregular heartbeats.  5. You become dizzy or lose consciousness.   If you have had an angiogram or had a pacemaker or AICD placed, and experience:  1. Bleeding, drainage or swelling at the surgical / puncture site.  2. Fever greater than 100.0 F  3. Shock from internal defibrillator.  4. Cool and / or numb extremities.    Aspirin and Your Heart   Aspirin is a medicine that affects the way blood clots. Aspirin can be used to help reduce the risk of blood clots, heart attacks, and other heart-related problems.   SHOULD I TAKE ASPIRIN?  Your health care provider will help you determine whether it is safe and beneficial for you to take aspirin daily. Taking aspirin daily may be beneficial if you:  · Have had a heart attack or chest pain.  · Have undergone open heart surgery such as coronary artery bypass surgery (CABG).  · Have had coronary angioplasty.  · Have experienced a stroke or transient ischemic attack (TIA).  · Have peripheral vascular disease (PVD).  · Have chronic heart rhythm problems such as atrial fibrillation.  ARE THERE ANY RISKS OF TAKING ASPIRIN DAILY?  Daily use of aspirin can increase your risk of side effects. Some of these include:  · Bleeding. Bleeding problems can be minor or serious. An example of a minor problem is a cut that does not stop bleeding. An example of a more serious problem is stomach bleeding or bleeding into the brain. Your risk of bleeding is increased if you are also  taking non-steroidal anti-inflammatory medicine (NSAIDs).  · Increased bruising.  · Upset stomach.  · An allergic reaction. People who have nasal polyps have an increased risk of developing an aspirin allergy.  WHAT ARE SOME GUIDELINES I SHOULD FOLLOW WHEN TAKING ASPIRIN?   · Take aspirin only as directed by your health care provider. Make sure you understand how much you should take and what form you should take. The two forms of aspirin are:  ¨ Non-enteric-coated. This type of aspirin does not have a coating and is absorbed quickly. Non-enteric-coated aspirin is usually recommended for people with chest pain. This type of aspirin also comes in a chewable form.  ¨ Enteric-coated. This type of aspirin has a special coating that releases the medicine very slowly. Enteric-coated aspirin causes less stomach upset than non-enteric-coated aspirin. This type of aspirin should not be chewed or crushed.  · Drink alcohol in moderation. Drinking alcohol increases your risk of bleeding.  WHEN SHOULD I SEEK MEDICAL CARE?   · You have unusual bleeding or bruising.  · You have stomach pain.  · You have an allergic reaction. Symptoms of an allergic reaction include:  ¨ Hives.  ¨ Itchy skin.  ¨ Swelling of the lips, tongue, or face.  · You have ringing in your ears.  WHEN SHOULD I SEEK IMMEDIATE MEDICAL CARE?   · Your bowel movements are bloody, dark red, or black in color.  · You vomit or cough up blood.  · You have blood in your urine.  · You cough, wheeze, or feel short of breath.  If you have any of the following symptoms, this is an emergency. Do not wait to see if the pain will go away. Get medical help at once. Call your local emergency services (911 in the [place] Do not drive yourself to the hospital.  · You have severe chest pain, especially if the pain is crushing or pressure-like and spreads to the arms, back, neck, or jaw.   · You have stroke-like symptoms, such as:    ¨ Loss of vision.    ¨ Difficulty talking.     ¨ Numbness or weakness on one side of your body.    ¨ Numbness or weakness in your arm or leg.    ¨ Not thinking clearly or feeling confused.       This information is not intended to replace advice given to you by your health care provider. Make sure you discuss any questions you have with your health care provider.     Document Released: 11/30/2009 Document Revised: 01/08/2016 Document Reviewed: 03/25/2015  [employer] Interactive Patient Education ©2016 [employer]      Heart Failure  Heart failure means your heart has trouble pumping blood. This makes it hard for your body to work well. Heart failure is usually a long-term (chronic) condition. You must take good care of yourself and follow your doctor's treatment plan.  HOME CARE  · Take your heart medicine as told by your doctor.  ¨ Do not stop taking medicine unless your doctor tells you to.  ¨ Do not skip any dose of medicine.  ¨ Refill your medicines before they run out.  ¨ Take other medicines only as told by your doctor or pharmacist.  · Stay active if told by your doctor. The elderly and people with severe heart failure should talk with a doctor about physical activity.  · Eat heart-healthy foods. Choose foods that are without trans fat and are low in saturated fat, cholesterol, and salt (sodium). This includes fresh or frozen fruits and vegetables, fish, lean meats, fat-free or low-fat dairy foods, whole grains, and high-fiber foods. Lentils and dried peas and beans (legumes) are also good choices.  · Limit salt if told by your doctor.  · Cook in a healthy way. Roast, grill, broil, bake, poach, steam, or stir-quiroga foods.  · Limit fluids as told by your doctor.  · Weigh yourself every morning. Do this after you pee (urinate) and before you eat breakfast. Write down your weight to give to your doctor.  · Take your blood pressure and write it down if your doctor tells you to.  · Ask your doctor how to check your pulse. Check your pulse as told.  · Lose  weight if told by your doctor.  · Stop smoking or chewing tobacco. Do not use gum or patches that help you quit without your doctor's approval.  · Schedule and go to doctor visits as told.  · Nonpregnant women should have no more than 1 drink a day. Men should have no more than 2 drinks a day. Talk to your doctor about drinking alcohol.  · Stop illegal drug use.  · Stay current with shots (immunizations).  · Manage your health conditions as told by your doctor.  · Learn to manage your stress.  · Rest when you are tired.  · If it is really hot outside:  ¨ Avoid intense activities.  ¨ Use air conditioning or fans, or get in a cooler place.  ¨ Avoid caffeine and alcohol.  ¨ Wear loose-fitting, lightweight, and light-colored clothing.  · If it is really cold outside:  ¨ Avoid intense activities.  ¨ Layer your clothing.  ¨ Wear mittens or gloves, a hat, and a scarf when going outside.  ¨ Avoid alcohol.  · Learn about heart failure and get support as needed.  · Get help to maintain or improve your quality of life and your ability to care for yourself as needed.  GET HELP IF:   · You gain weight quickly.  · You are more short of breath than usual.  · You cannot do your normal activities.  · You tire easily.  · You cough more than normal, especially with activity.  · You have any or more puffiness (swelling) in areas such as your hands, feet, ankles, or belly (abdomen).  · You cannot sleep because it is hard to breathe.  · You feel like your heart is beating fast (palpitations).  · You get dizzy or light-headed when you stand up.  GET HELP RIGHT AWAY IF:   · You have trouble breathing.  · There is a change in mental status, such as becoming less alert or not being able to focus.  · You have chest pain or discomfort.  · You faint.  MAKE SURE YOU:   · Understand these instructions.  · Will watch your condition.  · Will get help right away if you are not doing well or get worse.  This information is not intended to replace  advice given to you by your health care provider. Make sure you discuss any questions you have with your health care provider.  Document Released: 09/26/2009 Document Revised: 01/08/2016 Document Reviewed: 02/03/2014  [employer] Interactive Patient Education © 2017 [employer]      Acute Respiratory Failure, Adult  Acute respiratory failure occurs when there is not enough oxygen passing from your lungs to your body. When this happens, your lungs have trouble removing carbon dioxide from the blood. This causes your blood oxygen level to drop too low as carbon dioxide builds up.  Acute respiratory failure is a medical emergency. It can develop quickly, but it is temporary if treated promptly. Your lung capacity, or how much air your lungs can hold, may improve with time, exercise, and treatment.  What are the causes?  There are many possible causes of acute respiratory failure, including:  · Lung injury.  · Chest injury or damage to the ribs or tissues near the lungs.  · Lung conditions that affect the flow of air and blood into and out of the lungs, such as pneumonia, acute respiratory distress syndrome, and cystic fibrosis.  · Medical conditions, such as strokes or spinal cord injuries, that affect the muscles and nerves that control breathing.  · Blood infection (sepsis).  · Inflammation of the pancreas (pancreatitis).  · A blood clot in the lungs (pulmonary embolism).  · A large-volume blood transfusion.  · Burns.  · Near-drowning.  · Seizure.  · Smoke inhalation.  · Reaction to medicines.  · Alcohol or drug overdose.  What increases the risk?  This condition is more likely to develop in people who have:  · A blocked airway.  · Asthma.  · A condition or disease that damages or weakens the muscles, nerves, bones, or tissues that are involved in breathing.  · A serious infection.  · A health problem that blocks the unconscious reflex that is involved in breathing, such as hypothyroidism or sleep apnea.  · A lung  injury or trauma.  What are the signs or symptoms?  Trouble breathing is the main symptom of acute respiratory failure. Symptoms may also include:  · Rapid breathing.  · Restlessness or anxiety.  · Skin, lips, or fingernails that appear blue (cyanosis).  · Rapid heart rate.  · Abnormal heart rhythms (arrhythmias).  · Confusion or changes in behavior.  · Tiredness or loss of energy.  · Feeling sleepy or having a loss of consciousness.  How is this diagnosed?  Your health care provider can diagnose acute respiratory failure with a medical history and physical exam. During the exam, your health care provider will listen to your heart and check for crackling or wheezing sounds in your lungs. Your may also have tests to confirm the diagnosis and determine what is causing respiratory failure. These tests may include:  · Measuring the amount of oxygen in your blood (pulse oximetry). The measurement comes from a small device that is placed on your finger, earlobe, or toe.  · Other blood tests to measure blood gases and to look for signs of infection.  · Sampling your cerebral spinal fluid or tracheal fluid to check for infections.  · Chest X-ray to look for fluid in spaces that should be filled with air.  · Electrocardiogram (ECG) to look at the heart's electrical activity.  How is this treated?  Treatment for this condition usually takes places in a hospital intensive care unit (ICU). Treatment depends on what is causing the condition. It may include one or more treatments until your symptoms improve. Treatment may include:  · Supplemental oxygen. Extra oxygen is given through a tube in the nose, a face mask, or a loja.  · A device such as a continuous positive airway pressure (CPAP) or bi-level positive airway pressure (BiPAP or BPAP) machine. This treatment uses mild air pressure to keep the airways open. A mask or other device will be placed over your nose or mouth. A tube that is connected to a motor will deliver  oxygen through the mask.  · Ventilator. This treatment helps move air into and out of the lungs. This may be done with a bag and mask or a machine. For this treatment, a tube is placed in your windpipe (trachea) so air and oxygen can flow to the lungs.  · Extracorporeal membrane oxygenation (ECMO). This treatment temporarily takes over the function of the heart and lungs, supplying oxygen and removing carbon dioxide. ECMO gives the lungs a chance to recover. It may be used if a ventilator is not effective.  · Tracheostomy. This is a procedure that creates a hole in the neck to insert a breathing tube.  · Receiving fluids and medicines.  · Rocking the bed to help breathing.  Follow these instructions at home:  · Take over-the-counter and prescription medicines only as told by your health care provider.  · Return to normal activities as told by your health care provider. Ask your health care provider what activities are safe for you.  · Keep all follow-up visits as told by your health care provider. This is important.  How is this prevented?  Treating infections and medical conditions that may lead to acute respiratory failure can help prevent the condition from developing.  Contact a health care provider if:  · You have a fever.  · Your symptoms do not improve or they get worse.  Get help right away if:  · You are having trouble breathing.  · You lose consciousness.  · Your have cyanosis or turn blue.  · You develop a rapid heart rate.  · You are confused.  These symptoms may represent a serious problem that is an emergency. Do not wait to see if the symptoms will go away. Get medical help right away. Call your local emergency services (911 in the [place] Do not drive yourself to the hospital.   This information is not intended to replace advice given to you by your health care provider. Make sure you discuss any questions you have with your health care provider.  Document Released: 12/23/2014 Document Revised:  07/15/2017 Document Reviewed: 07/05/2017  [employer] Interactive Patient Education © 2017 [employer]      Discharge Instructions    Discharged to home by car with relative. Discharged via wheelchair, hospital escort: Yes.  Special equipment needed: Not Applicable    Be sure to schedule a follow-up appointment with your primary care doctor or any specialists as instructed.     Discharge Plan:   Influenza Vaccine Indication: Patient Refuses    I understand that a diet low in cholesterol, fat, and sodium is recommended for good health. Unless I have been given specific instructions below for another diet, I accept this instruction as my diet prescription.   Other diet: Regular    Special Instructions: None    · Is patient discharged on Warfarin / Coumadin?   No     Depression / Suicide Risk    As you are discharged from this [facility] facility, it is important to learn how to keep safe from harming yourself.    Recognize the warning signs:  · Abrupt changes in personality, positive or negative- including increase in energy   · Giving away possessions  · Change in eating patterns- significant weight changes-  positive or negative  · Change in sleeping patterns- unable to sleep or sleeping all the time   · Unwillingness or inability to communicate  · Depression  · Unusual sadness, discouragement and loneliness  · Talk of wanting to die  · Neglect of personal appearance   · Rebelliousness- reckless behavior  · Withdrawal from people/activities they love  · Confusion- inability to concentrate     If you or a loved one observes any of these behaviors or has concerns about self-harm, here's what you can do:  · Talk about it- your feelings and reasons for harming yourself  · Remove any means that you might use to hurt yourself (examples: pills, rope, extension cords, firearm)  · Get professional help from the community (Mental Health, Substance Abuse, psychological counseling)  · Do not be alone:Call your Safe Contact-  someone whom you trust who will be there for you.  · Call your local CRISIS HOTLINE 751-1018 or 225-181-2650  · Call your local Children's Mobile Crisis Response Team [place] (992) 559-1263 or www.[employer]  · Call the toll free National Suicide Prevention Hotlines   · National Suicide Prevention Lifeline 551-263-NNYI (0359)  · [employer] Line Network 800-SUICIDE (420-1899)

## 2020-01-29 NOTE — PROGRESS NOTES
Received report from NANO Jules. Patient resting in bed watching TV POC discussed. Patient denies any further needs at this time. Safety precautions in place. Call bell within reach. Will continue to monitor.

## 2020-01-29 NOTE — PROGRESS NOTES
Bedside report received. Assumed care of patient. Daily plan of care discussed. Pt resting comfortably in bed with no signs of distress noted. Breathing even and unlabored. Hourly rounding in place.

## 2020-01-29 NOTE — PROGRESS NOTES
[facility] Medicine Daily Progress Note    Date of Service  1/28/2020    Chief Complaint  89 y.o. male with a history of hypertension admitted 1/19/2020 with shortness of breath.    Hospital Course    History of hypertension and chronic 2 L oxygen dependent respiratory failure admitted with shortness of breath found to have acute on chronic respiratory failure secondary to pulmonary edema, elevated BNP      Interval Problem Update  1/28: Still dyspneic at rest, appetite improved slightly.  Pending home oxygen and home health    Consultants/Specialty  None    Code Status  DNR, DNI    Disposition  Patient declined SNF; home with     Review of Systems  Review of Systems   Constitutional: Positive for malaise/fatigue. Negative for chills and fever.   HENT: Positive for hearing loss (Chronic, baseline). Negative for sore throat.    Respiratory: Positive for shortness of breath (At rest, constant). Negative for cough.    Cardiovascular: Negative for chest pain and palpitations.   Gastrointestinal: Negative for abdominal pain, blood in stool, diarrhea, heartburn, nausea and vomiting.   Genitourinary: Negative for dysuria and frequency.   Musculoskeletal: Negative for back pain and myalgias.   Neurological: Positive for weakness. Negative for dizziness, focal weakness and headaches.   Psychiatric/Behavioral: Negative for depression and hallucinations. The patient is nervous/anxious.    All other systems reviewed and are negative.       Physical Exam  Temp:  [36.3 °C (97.4 °F)-36.6 °C (97.9 °F)] 36.6 °C (97.9 °F)  Pulse:  [86-89] 87  Resp:  [18-20] 18  BP: (130-146)/(78-87) 140/84  SpO2:  [94 %-97 %] 95 %    Physical Exam  Constitutional:       Appearance: Normal appearance.      Comments: Thin   HENT:      Head: Normocephalic and atraumatic.      Nose: Nose normal.      Mouth/Throat:      Mouth: Mucous membranes are moist.   Eyes:      Extraocular Movements: Extraocular movements intact.      Pupils: Pupils are equal,  round, and reactive to light.   Neck:      Musculoskeletal: Normal range of motion and neck supple.   Cardiovascular:      Rate and Rhythm: Normal rate and regular rhythm.      Heart sounds: No murmur.   Pulmonary:      Effort: Pulmonary effort is normal. No respiratory distress.      Breath sounds: No stridor.      Comments: Resolving rales  Abdominal:      General: Abdomen is flat. Bowel sounds are normal. There is no distension.      Palpations: Abdomen is soft.      Tenderness: There is no tenderness.   Musculoskeletal:         General: No swelling, tenderness or deformity.      Right lower leg: No edema.      Left lower leg: No edema.      Comments: Resolved edema   Skin:     General: Skin is warm and dry.      Coloration: Skin is not pale.   Neurological:      General: No focal deficit present.      Mental Status: He is alert and oriented to person, place, and time. Mental status is at baseline.   Psychiatric:         Mood and Affect: Mood normal.         Behavior: Behavior normal.         Thought Content: Thought content normal.         Fluids    Intake/Output Summary (Last 24 hours) at 1/29/2020 1216  Last data filed at 1/29/2020 1010  Gross per 24 hour   Intake 240 ml   Output 100 ml   Net 140 ml       Laboratory                        Imaging  EC-ECHOCARDIOGRAM COMPLETE W/O CONT   Final Result      DX-ESOPHAGUS - [id number]-FB   Final Result      CT-CTA CHEST PULMONARY ARTERY W/ RECONS   Final Result      No evidence of pulmonary embolus.      Peripheral interstitial prominence suggesting pulmonary edema.      Small, right greater than left, pleural effusions.                 Assessment/Plan  * Acute on chronic respiratory failure with hypoxia (HCC)- (present on admission)  Assessment & Plan  Secondary to acute CHF,new onset. Diuresing better with increased lasix dose; lasix held due to volume contraction; CHF is compensated now  Echo showed 55% EF, grade 2 diastolic dysfunction and mild AI/MR  He has  weaned to baseline 2L oxygen  Needs new O2 order for home  Initially enrolled patient with hospice few days ago but changed his mind 1/25  Home soon with HH vs SNF      Acute exacerbation of CHF (congestive heart failure) ([facility])- (present on admission)  Assessment & Plan  No hx of CHF in the past, echo noted, very elevated BNP and pulmonary edema on x-ray  Echo showed 55% EF with diastolic dysfunction grade 2  CTA PE neg for PE but shows pleural effusion and pulmonary edema.  Responded well to iv lasix; compensated now, and lasix on hold  Strict I/Os; daily weights        Dysphagia- (present on admission)  Assessment & Plan  Noted by speech therapy  Modified barium swallow results noted; regular diet with thin liquids per ST    Macrocytic anemia- (present on admission)  Assessment & Plan  He denies blood loss  Chronic and stable    Pain in the chest- (present on admission)  Assessment & Plan  EKG unimpressive for acute ischemia  troponin's mildly elevated at 30, not rising.  No chest pain at this time.  Follow-up echocardiogram outpatient  Aspirin    COPD (chronic obstructive pulmonary disease) ([facility])- (present on admission)  Assessment & Plan  Baseline 2L at home, quit smoking 20 years ago  At this time no evidence of exacerbation  Continue RT protocol.       Weakness- (present on admission)  Assessment & Plan  Acute on chronic; can walk with walker for short distance at home; weaker now due to acute illnesses, advised by PT/OT to transfer to SNF but patient declined.  Lives with son, planning for HH w O2    HTN (hypertension)- (present on admission)  Assessment & Plan  Continue home dose of enalapril        VTE prophylaxis: Lovenox

## 2020-01-29 NOTE — PROGRESS NOTES
Assessment complete. Patient A&Ox3 disoriented to time. Patient has complaints of 3/10 pain in his right shoulder, denies any interventions at this time. Safety precautions in place. Call bell within reach. Will continue to monitor.

## 2020-01-29 NOTE — CARE PLAN
Problem: Safety  Goal: Will remain free from injury  1/29/2020 0806 by Sondra Low R.N.  Outcome: PROGRESSING AS EXPECTED  Note:   Free from falls/injury. Safety measures in place. Pt calls appropriately.   1/29/2020 0803 by Sondra Low R.N.  Outcome: PROGRESSING AS EXPECTED  Note:   Free from falls/injury. Safety measures in place. Pt calls appropriately.      Problem: Respiratory:  Goal: Respiratory status will improve  Outcome: PROGRESSING AS EXPECTED  Note:   Patient down to 1L.  Uses 2L baseline at home prn. Denies SOB.

## 2020-01-29 NOTE — FACE TO FACE
"Face to Face Note  -  Durable Medical Equipment    Aleena Montes D.O. - NPI: [id number]  I certify that this patient is under my care and that they had a durable medical equipment(DME)face to face encounter by myself that meets the physician DME face-to-face encounter requirements with this patient on:    Date of encounter:   Patient:                    MRN:                       YOB: 2020  Jhon Begum  [id number]  6/20/1927     The encounter with the patient was in whole, or in part, for the following medical condition, which is the primary reason for durable medical equipment:  COPD    I certify that, based on my findings, the following durable medical equipment is medically necessary:  Oxygen.    HOME O2 Saturation Measurements:(Values must be present for Home Oxygen orders)  Room air sat at rest: 92  Room air sat with amb: 87  With liters of O2: 2, O2 sat at rest with O2: 95  With Liters of O2: 2, O2 sat with amb with O2 : 92  Is the patient mobile?: Yes    My Clinical findings support the need for the above equipment due to:  Hypoxia    Supporting Symptoms: The patient requires supplemental oxygen, as the following interventions have been tried with limited or no improvement: \"Bronchodilators and/or steroid inhalers    "

## 2020-01-30 NOTE — PROGRESS NOTES
Discharge order written.O2 delivered to bedside and pt transported home with it. Son called O2 company to get concetrator sent to home. Belongings gathered. Pt states that all personal belongings are in possession. AVS printed, reviewed and copy signed and placed on the chart. Patient has no further questions. Prescriptions sent to [facility] pharmacy. Discharged in satisfactory condition home with son. Pt off unit via wheelchair, escorted by NANO Burt.

## 2020-01-31 ENCOUNTER — PATIENT OUTREACH (OUTPATIENT)
Dept: HEALTH INFORMATION MANAGEMENT | Facility: OTHER | Age: 85
End: 2020-01-31

## 2020-01-31 ENCOUNTER — HOME CARE VISIT (OUTPATIENT)
Dept: HOME HEALTH SERVICES | Facility: HOME HEALTHCARE | Age: 85
End: 2020-01-31
Payer: MEDICARE

## 2020-01-31 ENCOUNTER — ANTICOAGULATION MONITORING (OUTPATIENT)
Dept: MEDICAL GROUP | Facility: PHYSICIAN GROUP | Age: 85
End: 2020-01-31

## 2020-01-31 VITALS
HEART RATE: 78 BPM | TEMPERATURE: 97.4 F | WEIGHT: 115 LBS | DIASTOLIC BLOOD PRESSURE: 70 MMHG | HEIGHT: 69 IN | SYSTOLIC BLOOD PRESSURE: 118 MMHG | BODY MASS INDEX: 17.03 KG/M2 | RESPIRATION RATE: 18 BRPM | OXYGEN SATURATION: 93 %

## 2020-01-31 PROCEDURE — 6650537 HCR  CLEANSER ANTISEPTIC HAND FOAM 1.6OZ

## 2020-01-31 PROCEDURE — 665001 SOC-HOME HEALTH

## 2020-01-31 PROCEDURE — G0493 RN CARE EA 15 MIN HH/HOSPICE: HCPCS

## 2020-01-31 SDOH — ECONOMIC STABILITY: HOUSING INSECURITY: EVIDENCE OF SMOKING MATERIAL: 0

## 2020-01-31 ASSESSMENT — ENCOUNTER SYMPTOMS
MUSCLE WEAKNESS: 1
SHORTNESS OF BREATH: T
VOMITING: DENIES
NAUSEA: DENIES
ADDITIONAL INFORMATION: 0-5 PAIN RANGE
LIMITED RANGE OF MOTION: 1

## 2020-01-31 ASSESSMENT — PATIENT HEALTH QUESTIONNAIRE - PHQ9
1. LITTLE INTEREST OR PLEASURE IN DOING THINGS: 00
2. FEELING DOWN, DEPRESSED, IRRITABLE, OR HOPELESS: 00
CLINICAL INTERPRETATION OF PHQ2 SCORE: 0

## 2020-01-31 ASSESSMENT — ACTIVITIES OF DAILY LIVING (ADL)
AMBULATION ASSISTANCE: ONE PERSON
AMBULATION ASSISTANCE: STAND BY ASSIST
OASIS_M1830: 05
CURRENT_FUNCTION: ONE PERSON
TRANSPORTATION COMMENTS: FATIGUES EASILY

## 2020-01-31 NOTE — PROGRESS NOTES
Received referral from [facility]. Medications reviewed. No clinically significant interactions noted.

## 2020-02-01 ENCOUNTER — HOME CARE VISIT (OUTPATIENT)
Dept: HOME HEALTH SERVICES | Facility: HOME HEALTHCARE | Age: 85
End: 2020-02-01
Payer: MEDICARE

## 2020-02-01 VITALS
HEART RATE: 76 BPM | RESPIRATION RATE: 16 BRPM | SYSTOLIC BLOOD PRESSURE: 122 MMHG | DIASTOLIC BLOOD PRESSURE: 64 MMHG | OXYGEN SATURATION: 97 % | TEMPERATURE: 98 F

## 2020-02-01 PROCEDURE — G0151 HHCP-SERV OF PT,EA 15 MIN: HCPCS

## 2020-02-01 SDOH — ECONOMIC STABILITY: HOUSING INSECURITY: EVIDENCE OF SMOKING MATERIAL: 0

## 2020-02-01 ASSESSMENT — ACTIVITIES OF DAILY LIVING (ADL)
CURRENT_FUNCTION: STAND BY ASSIST
AMBULATION ASSISTANCE ON FLAT SURFACES: 1
AMBULATION_DISTANCE/DURATION_TOLERATED: 50' X 2
AMBULATION ASSISTANCE: ONE PERSON

## 2020-02-01 ASSESSMENT — ENCOUNTER SYMPTOMS
MUSCLE WEAKNESS: 1
LIMITED RANGE OF MOTION: 1

## 2020-02-04 ENCOUNTER — HOME CARE VISIT (OUTPATIENT)
Dept: HOME HEALTH SERVICES | Facility: HOME HEALTHCARE | Age: 85
End: 2020-02-04
Payer: MEDICARE

## 2020-02-04 VITALS
TEMPERATURE: 98 F | DIASTOLIC BLOOD PRESSURE: 80 MMHG | SYSTOLIC BLOOD PRESSURE: 128 MMHG | RESPIRATION RATE: 16 BRPM | OXYGEN SATURATION: 90 % | HEART RATE: 71 BPM

## 2020-02-04 PROCEDURE — G0157 HHC PT ASSISTANT EA 15: HCPCS | Mod: CQ

## 2020-02-05 ENCOUNTER — HOME CARE VISIT (OUTPATIENT)
Dept: HOME HEALTH SERVICES | Facility: HOME HEALTHCARE | Age: 85
End: 2020-02-05
Payer: MEDICARE

## 2020-02-05 VITALS
DIASTOLIC BLOOD PRESSURE: 80 MMHG | HEART RATE: 72 BPM | SYSTOLIC BLOOD PRESSURE: 137 MMHG | RESPIRATION RATE: 18 BRPM | TEMPERATURE: 97.9 F | OXYGEN SATURATION: 94 %

## 2020-02-05 PROCEDURE — G0299 HHS/HOSPICE OF RN EA 15 MIN: HCPCS

## 2020-02-05 PROCEDURE — G0156 HHCP-SVS OF AIDE,EA 15 MIN: HCPCS

## 2020-02-06 ENCOUNTER — HOME CARE VISIT (OUTPATIENT)
Dept: HOME HEALTH SERVICES | Facility: HOME HEALTHCARE | Age: 85
End: 2020-02-06
Payer: MEDICARE

## 2020-02-06 VITALS
HEART RATE: 72 BPM | RESPIRATION RATE: 18 BRPM | SYSTOLIC BLOOD PRESSURE: 137 MMHG | DIASTOLIC BLOOD PRESSURE: 80 MMHG | OXYGEN SATURATION: 94 % | TEMPERATURE: 97.9 F

## 2020-02-06 VITALS
HEART RATE: 81 BPM | TEMPERATURE: 97.9 F | DIASTOLIC BLOOD PRESSURE: 60 MMHG | RESPIRATION RATE: 18 BRPM | SYSTOLIC BLOOD PRESSURE: 110 MMHG | OXYGEN SATURATION: 94 %

## 2020-02-06 PROCEDURE — G0153 HHCP-SVS OF S/L PATH,EA 15MN: HCPCS

## 2020-02-06 PROCEDURE — G0157 HHC PT ASSISTANT EA 15: HCPCS | Mod: CQ

## 2020-02-06 ASSESSMENT — ENCOUNTER SYMPTOMS
ANGER WITHIN DEFINED LIMITS: 1
AGGRESSION WITHIN DEFINED LIMITS: 1
VOMITING: DENIES
NAUSEA: DENIES
MUSCLE WEAKNESS: 1

## 2020-02-07 ENCOUNTER — HOME CARE VISIT (OUTPATIENT)
Dept: HOME HEALTH SERVICES | Facility: HOME HEALTHCARE | Age: 85
End: 2020-02-07
Payer: MEDICARE

## 2020-02-07 VITALS
SYSTOLIC BLOOD PRESSURE: 120 MMHG | RESPIRATION RATE: 18 BRPM | OXYGEN SATURATION: 98 % | HEART RATE: 78 BPM | TEMPERATURE: 97.7 F | DIASTOLIC BLOOD PRESSURE: 80 MMHG

## 2020-02-07 PROCEDURE — G0493 RN CARE EA 15 MIN HH/HOSPICE: HCPCS

## 2020-02-07 SDOH — ECONOMIC STABILITY: HOUSING INSECURITY
HOME SAFETY: FIRE ESCAPE PLAN DEVELOPED. PATIENT DOES NOT HAVE FLAMMABLE MATERIALS PRESENT IN THE HOME PRESENTING A FIRE HAZARD. NO EVIDENCE FOUND OF SMOKING MATERIALS PRESENT IN THE HOME.

## 2020-02-07 SDOH — ECONOMIC STABILITY: HOUSING INSECURITY
HOME SAFETY: OXYGEN SAFETY RISK ASSESSMENT PERFORMED. PATIENT DOES HAVE A NO SMOKING SIGN POSTED IN THE HOME. PATIENT DOES HAVE A WORKING FIRE EXTINGUISHER PRESENT IN THE HOME. SMOKE ALARMS ARE PRESENT AND FUNCTIONAL ON EACH LEVEL OF THE HOME. PATIENT DOES HAVE A

## 2020-02-07 ASSESSMENT — ENCOUNTER SYMPTOMS: MUSCLE WEAKNESS: 1

## 2020-02-07 ASSESSMENT — ACTIVITIES OF DAILY LIVING (ADL)
AMBULATION ASSISTANCE: STAND BY ASSIST
CURRENT_FUNCTION: STAND BY ASSIST

## 2020-02-10 ENCOUNTER — HOME CARE VISIT (OUTPATIENT)
Dept: HOME HEALTH SERVICES | Facility: HOME HEALTHCARE | Age: 85
End: 2020-02-10
Payer: MEDICARE

## 2020-02-10 PROCEDURE — G0299 HHS/HOSPICE OF RN EA 15 MIN: HCPCS

## 2020-02-10 PROCEDURE — G0152 HHCP-SERV OF OT,EA 15 MIN: HCPCS

## 2020-02-10 ASSESSMENT — ENCOUNTER SYMPTOMS
LIMITED RANGE OF MOTION: 1
SHORTNESS OF BREATH: T
MUSCLE WEAKNESS: 1

## 2020-02-11 VITALS
RESPIRATION RATE: 18 BRPM | HEART RATE: 88 BPM | TEMPERATURE: 99.1 F | SYSTOLIC BLOOD PRESSURE: 124 MMHG | OXYGEN SATURATION: 98 % | DIASTOLIC BLOOD PRESSURE: 78 MMHG

## 2020-02-11 VITALS
OXYGEN SATURATION: 95 % | SYSTOLIC BLOOD PRESSURE: 120 MMHG | DIASTOLIC BLOOD PRESSURE: 80 MMHG | TEMPERATURE: 98.4 F | RESPIRATION RATE: 18 BRPM | HEART RATE: 82 BPM

## 2020-02-11 SDOH — ECONOMIC STABILITY: HOUSING INSECURITY: EVIDENCE OF SMOKING MATERIAL: 0

## 2020-02-11 SDOH — ECONOMIC STABILITY: HOUSING INSECURITY
HOME SAFETY: HAVE A WORKING FIRE EXTINGUISHER PRESENT IN THE HOME. SMOKE ALARMS ARE PRESENT AND FUNCTIONAL ON EACH LEVEL OF THE HOME. PATIENT DOES HAVE A FIRE ESCAPE PLAN DEVELOPED. PATIENT DOES NOT HAVE FLAMMABLE MATERIALS PRESENT IN THE HOME PRESENTING A FIRE H

## 2020-02-11 SDOH — ECONOMIC STABILITY: HOUSING INSECURITY
HOME SAFETY: AZARD. NO EVIDENCE FOUND OF SMOKING MATERIALS PRESENT IN THE HOME.  EDUCATED ON SAFETY OF FLOOR COVERINGS AND FALLS AND TO REMOVE ANY THROW RUGS DUE TO FALL RISK.    EDUCATED ON GRAB BARS IN BATHROOM AND GETTING TRANSFER BENCH FOR SHOWER/TUB FOR SAFE

## 2020-02-11 SDOH — ECONOMIC STABILITY: HOUSING INSECURITY: HOME SAFETY: TY TO PREVENT FALLS AND SLIPS.

## 2020-02-11 SDOH — ECONOMIC STABILITY: HOUSING INSECURITY
HOME SAFETY: .OXYGEN SAFETY RISK ASSESSMENT PERFORMED. PATIENT PT WAS GIVEN A NO SMOKING SIGN AND PROVIDED EDUCATION ABOUT WHY IT IS IMPORTANT TO PLACE ONE.PT WAS GIVEN A NO SMOKING SIGN AND PROVIDED EDUCATION ABOUT WHY IT IS IMPORTANT TO PLACE ONE. PATIENT DOES

## 2020-02-11 ASSESSMENT — ACTIVITIES OF DAILY LIVING (ADL)
GROOMING ASSESSED: 1
AMBULATION ASSISTANCE: 1
FEEDING_WITHIN_DEFINED_LIMITS: 1
DRESSING_UB_CURRENT_FUNCTION: INDEPENDENT
LAUNDRY ASSESSED: 1
DRESSING_LB_CURRENT_FUNCTION: SUPERVISION
GROOMING_WITHIN_DEFINED_LIMITS: 1
LAUNDRY: DEPENDENT
TOILETING: CONTACT GUARD ASSIST
CURRENT_FUNCTION: STAND BY ASSIST
TOILETING: 1
PHYSICAL TRANSFERS ASSESSED: 1
PREPARING MEALS: DEPENDENT
FEEDING ASSESSED: 1
AMBULATION ASSISTANCE: STAND BY ASSIST
FEEDING: INDEPENDENT
GROOMING_CURRENT_FUNCTION: INDEPENDENT
PHYSICAL_TRANSFERS_DEVICES: FWW

## 2020-02-12 ENCOUNTER — HOME CARE VISIT (OUTPATIENT)
Dept: HOME HEALTH SERVICES | Facility: HOME HEALTHCARE | Age: 85
End: 2020-02-12
Payer: MEDICARE

## 2020-02-12 VITALS
SYSTOLIC BLOOD PRESSURE: 122 MMHG | OXYGEN SATURATION: 92 % | TEMPERATURE: 98.6 F | DIASTOLIC BLOOD PRESSURE: 60 MMHG | HEART RATE: 83 BPM | RESPIRATION RATE: 18 BRPM

## 2020-02-12 PROCEDURE — G0157 HHC PT ASSISTANT EA 15: HCPCS | Mod: CQ

## 2020-02-13 ENCOUNTER — HOME CARE VISIT (OUTPATIENT)
Dept: HOME HEALTH SERVICES | Facility: HOME HEALTHCARE | Age: 85
End: 2020-02-13
Payer: MEDICARE

## 2020-02-13 VITALS
TEMPERATURE: 98.1 F | SYSTOLIC BLOOD PRESSURE: 140 MMHG | OXYGEN SATURATION: 94 % | RESPIRATION RATE: 18 BRPM | HEART RATE: 88 BPM | DIASTOLIC BLOOD PRESSURE: 78 MMHG

## 2020-02-13 VITALS
BODY MASS INDEX: 18.46 KG/M2 | TEMPERATURE: 98.1 F | HEART RATE: 88 BPM | OXYGEN SATURATION: 94 % | WEIGHT: 125 LBS | SYSTOLIC BLOOD PRESSURE: 140 MMHG | RESPIRATION RATE: 18 BRPM | DIASTOLIC BLOOD PRESSURE: 78 MMHG

## 2020-02-13 PROCEDURE — G0299 HHS/HOSPICE OF RN EA 15 MIN: HCPCS

## 2020-02-13 PROCEDURE — G0152 HHCP-SERV OF OT,EA 15 MIN: HCPCS

## 2020-02-13 PROCEDURE — G0153 HHCP-SVS OF S/L PATH,EA 15MN: HCPCS

## 2020-02-13 SDOH — ECONOMIC STABILITY: HOUSING INSECURITY: EVIDENCE OF SMOKING MATERIAL: 1

## 2020-02-13 ASSESSMENT — ENCOUNTER SYMPTOMS
LIMITED RANGE OF MOTION: 1
POOR JUDGMENT: 1
MUSCLE WEAKNESS: 1

## 2020-02-14 ENCOUNTER — HOME CARE VISIT (OUTPATIENT)
Dept: HOME HEALTH SERVICES | Facility: HOME HEALTHCARE | Age: 85
End: 2020-02-14
Payer: MEDICARE

## 2020-02-14 PROCEDURE — G0157 HHC PT ASSISTANT EA 15: HCPCS | Mod: CQ

## 2020-02-16 VITALS
OXYGEN SATURATION: 94 % | HEART RATE: 88 BPM | DIASTOLIC BLOOD PRESSURE: 78 MMHG | RESPIRATION RATE: 18 BRPM | TEMPERATURE: 98.1 F | SYSTOLIC BLOOD PRESSURE: 110 MMHG

## 2020-02-16 VITALS
DIASTOLIC BLOOD PRESSURE: 60 MMHG | OXYGEN SATURATION: 94 % | TEMPERATURE: 98.2 F | RESPIRATION RATE: 18 BRPM | SYSTOLIC BLOOD PRESSURE: 100 MMHG | HEART RATE: 82 BPM

## 2020-02-17 ENCOUNTER — HOME CARE VISIT (OUTPATIENT)
Dept: HOME HEALTH SERVICES | Facility: HOME HEALTHCARE | Age: 85
End: 2020-02-17
Payer: MEDICARE

## 2020-02-17 VITALS
OXYGEN SATURATION: 91 % | DIASTOLIC BLOOD PRESSURE: 78 MMHG | RESPIRATION RATE: 18 BRPM | SYSTOLIC BLOOD PRESSURE: 140 MMHG | HEART RATE: 60 BPM | TEMPERATURE: 98.6 F

## 2020-02-17 VITALS
TEMPERATURE: 98.6 F | SYSTOLIC BLOOD PRESSURE: 136 MMHG | OXYGEN SATURATION: 93 % | RESPIRATION RATE: 18 BRPM | HEART RATE: 82 BPM | DIASTOLIC BLOOD PRESSURE: 72 MMHG

## 2020-02-17 PROCEDURE — G0495 RN CARE TRAIN/EDU IN HH: HCPCS

## 2020-02-17 PROCEDURE — G0152 HHCP-SERV OF OT,EA 15 MIN: HCPCS

## 2020-02-17 SDOH — ECONOMIC STABILITY: HOUSING INSECURITY: EVIDENCE OF SMOKING MATERIAL: 0

## 2020-02-17 ASSESSMENT — ENCOUNTER SYMPTOMS
MUSCLE WEAKNESS: 1
DIFFICULTY THINKING: 1
LIMITED RANGE OF MOTION: 1

## 2020-02-17 NOTE — DOCUMENTATION QUERY
"                                                                         [facility]                                                                       Query Response Note      PATIENT:               YANELI MEZA  ACCT #:                  [id number]  MRN:                     [id number]  :                      1927  ADMIT DATE:       2020 3:13 PM  DISCH DATE:        2020 4:41 PM  RESPONDING  PROVIDER #:        [id number]           QUERY TEXT:    It is unclear whether  aspiration pneumonia documented in the discharge summary was present on admission.  Your help is needed.  Please clarify the POA status.    The patient's Clinical Indicators include:  Admitted with chest pain,shortness of breath,dysphagia and hypoxia.  Denies fever has occasional cough.  BNP 63457 on admission  Troponin 30 on admission  CT scan of chest: \"Pleural effusions  Echo 55% EF with diastolic dysfunction.    /88  /  Pulse 82 /  Temp  37.3  /  Respirations 14  O2 2L  Lasix IV      Discharge Summary: \" He was found to have what appeared to be aspiration pneumonia.\"  Options provided:   -- Aspiration pneumonia was present on admission   -- Aspiration pneumonia was not present on admission   -- Unable to determine      Query created by: Melba Montgomery on 2020 2:58 AM    RESPONSE TEXT:    Aspiration pneumonia was present on admission          Electronically signed by:  NENO ROTH DO 2020 12:49 PM              "

## 2020-02-18 ENCOUNTER — HOME CARE VISIT (OUTPATIENT)
Dept: HOME HEALTH SERVICES | Facility: HOME HEALTHCARE | Age: 85
End: 2020-02-18
Payer: MEDICARE

## 2020-02-18 VITALS
OXYGEN SATURATION: 93 % | HEART RATE: 87 BPM | DIASTOLIC BLOOD PRESSURE: 70 MMHG | TEMPERATURE: 98.5 F | RESPIRATION RATE: 18 BRPM | SYSTOLIC BLOOD PRESSURE: 130 MMHG

## 2020-02-18 PROCEDURE — G0157 HHC PT ASSISTANT EA 15: HCPCS | Mod: CQ

## 2020-02-20 ENCOUNTER — HOME CARE VISIT (OUTPATIENT)
Dept: HOME HEALTH SERVICES | Facility: HOME HEALTHCARE | Age: 85
End: 2020-02-20
Payer: MEDICARE

## 2020-02-20 PROCEDURE — G0495 RN CARE TRAIN/EDU IN HH: HCPCS

## 2020-02-20 PROCEDURE — G0152 HHCP-SERV OF OT,EA 15 MIN: HCPCS

## 2020-02-20 SDOH — ECONOMIC STABILITY: HOUSING INSECURITY: EVIDENCE OF SMOKING MATERIAL: 0

## 2020-02-20 ASSESSMENT — ENCOUNTER SYMPTOMS
MUSCLE WEAKNESS: 1
POOR JUDGMENT: 1
SEVERE DYSPNEA: 1

## 2020-02-21 VITALS
BODY MASS INDEX: 19.05 KG/M2 | OXYGEN SATURATION: 94 % | TEMPERATURE: 98.4 F | RESPIRATION RATE: 18 BRPM | SYSTOLIC BLOOD PRESSURE: 122 MMHG | DIASTOLIC BLOOD PRESSURE: 70 MMHG | HEART RATE: 86 BPM | WEIGHT: 129 LBS

## 2020-02-21 VITALS
SYSTOLIC BLOOD PRESSURE: 138 MMHG | HEART RATE: 82 BPM | OXYGEN SATURATION: 90 % | TEMPERATURE: 98.6 F | DIASTOLIC BLOOD PRESSURE: 68 MMHG | RESPIRATION RATE: 18 BRPM

## 2020-02-21 ASSESSMENT — ACTIVITIES OF DAILY LIVING (ADL)
AMBULATION ASSISTANCE: STAND BY ASSIST
CURRENT_FUNCTION: STAND BY ASSIST

## 2020-02-22 ENCOUNTER — HOSPITAL ENCOUNTER (INPATIENT)
Facility: MEDICAL CENTER | Age: 85
LOS: 4 days | DRG: 190 | End: 2020-02-26
Attending: EMERGENCY MEDICINE | Admitting: INTERNAL MEDICINE
Payer: MEDICARE

## 2020-02-22 ENCOUNTER — APPOINTMENT (OUTPATIENT)
Dept: RADIOLOGY | Facility: MEDICAL CENTER | Age: 85
DRG: 190 | End: 2020-02-22
Attending: EMERGENCY MEDICINE
Payer: MEDICARE

## 2020-02-22 ENCOUNTER — HOME CARE VISIT (OUTPATIENT)
Dept: HOME HEALTH SERVICES | Facility: HOME HEALTHCARE | Age: 85
End: 2020-02-22
Payer: MEDICARE

## 2020-02-22 DIAGNOSIS — I50.22 CHRONIC SYSTOLIC CONGESTIVE HEART FAILURE (HCC): ICD-10-CM

## 2020-02-22 DIAGNOSIS — J96.21 ACUTE ON CHRONIC RESPIRATORY FAILURE WITH HYPOXIA (HCC): ICD-10-CM

## 2020-02-22 DIAGNOSIS — E87.70 HYPERVOLEMIA, UNSPECIFIED HYPERVOLEMIA TYPE: ICD-10-CM

## 2020-02-22 DIAGNOSIS — I50.33 ACUTE ON CHRONIC DIASTOLIC CONGESTIVE HEART FAILURE (HCC): ICD-10-CM

## 2020-02-22 DIAGNOSIS — J96.21 ACUTE ON CHRONIC RESPIRATORY FAILURE WITH HYPOXEMIA (HCC): ICD-10-CM

## 2020-02-22 DIAGNOSIS — I50.32 CHRONIC DIASTOLIC (CONGESTIVE) HEART FAILURE (HCC): ICD-10-CM

## 2020-02-22 DIAGNOSIS — Z66 DNR (DO NOT RESUSCITATE): ICD-10-CM

## 2020-02-22 LAB
ALBUMIN SERPL BCP-MCNC: 3.9 G/DL (ref 3.2–4.9)
ALBUMIN/GLOB SERPL: 1.9 G/DL
ALP SERPL-CCNC: 68 U/L (ref 30–99)
ALT SERPL-CCNC: 11 U/L (ref 2–50)
ANION GAP SERPL CALC-SCNC: 13 MMOL/L (ref 7–16)
APTT PPP: 25.3 SEC (ref 24.7–36)
AST SERPL-CCNC: 20 U/L (ref 12–45)
BASOPHILS # BLD AUTO: 0.3 % (ref 0–1.8)
BASOPHILS # BLD: 0.02 K/UL (ref 0–0.12)
BILIRUB SERPL-MCNC: 0.8 MG/DL (ref 0.1–1.5)
BUN SERPL-MCNC: 19 MG/DL (ref 8–22)
CALCIUM SERPL-MCNC: 9.1 MG/DL (ref 8.4–10.2)
CHLORIDE SERPL-SCNC: 97 MMOL/L (ref 96–112)
CO2 SERPL-SCNC: 25 MMOL/L (ref 20–33)
CREAT SERPL-MCNC: 0.82 MG/DL (ref 0.5–1.4)
EKG IMPRESSION: NORMAL
EKG IMPRESSION: NORMAL
EOSINOPHIL # BLD AUTO: 0.06 K/UL (ref 0–0.51)
EOSINOPHIL NFR BLD: 0.8 % (ref 0–6.9)
ERYTHROCYTE [DISTWIDTH] IN BLOOD BY AUTOMATED COUNT: 52.8 FL (ref 35.9–50)
GLOBULIN SER CALC-MCNC: 2.1 G/DL (ref 1.9–3.5)
GLUCOSE SERPL-MCNC: 105 MG/DL (ref 65–99)
HCT VFR BLD AUTO: 39.4 % (ref 42–52)
HGB BLD-MCNC: 12.6 G/DL (ref 14–18)
IMM GRANULOCYTES # BLD AUTO: 0.07 K/UL (ref 0–0.11)
IMM GRANULOCYTES NFR BLD AUTO: 1 % (ref 0–0.9)
INR PPP: 1.04 (ref 0.87–1.13)
LIPASE SERPL-CCNC: 13 U/L (ref 7–58)
LYMPHOCYTES # BLD AUTO: 0.79 K/UL (ref 1–4.8)
LYMPHOCYTES NFR BLD: 11.2 % (ref 22–41)
MCH RBC QN AUTO: 33.1 PG (ref 27–33)
MCHC RBC AUTO-ENTMCNC: 32 G/DL (ref 33.7–35.3)
MCV RBC AUTO: 103.4 FL (ref 81.4–97.8)
MONOCYTES # BLD AUTO: 0.72 K/UL (ref 0–0.85)
MONOCYTES NFR BLD AUTO: 10.2 % (ref 0–13.4)
NEUTROPHILS # BLD AUTO: 5.41 K/UL (ref 1.82–7.42)
NEUTROPHILS NFR BLD: 76.5 % (ref 44–72)
NRBC # BLD AUTO: 0 K/UL
NRBC BLD-RTO: 0 /100 WBC
NT-PROBNP SERPL IA-MCNC: ABNORMAL PG/ML (ref 0–125)
PLATELET # BLD AUTO: 122 K/UL (ref 164–446)
PMV BLD AUTO: 9.6 FL (ref 9–12.9)
POTASSIUM SERPL-SCNC: 4.8 MMOL/L (ref 3.6–5.5)
PROT SERPL-MCNC: 6 G/DL (ref 6–8.2)
PROTHROMBIN TIME: 13.7 SEC (ref 12–14.6)
RBC # BLD AUTO: 3.81 M/UL (ref 4.7–6.1)
SODIUM SERPL-SCNC: 135 MMOL/L (ref 135–145)
TROPONIN T SERPL-MCNC: 32 NG/L (ref 6–19)
VIT B12 SERPL-MCNC: 138 PG/ML (ref 211–911)
WBC # BLD AUTO: 7.1 K/UL (ref 4.8–10.8)

## 2020-02-22 PROCEDURE — 700101 HCHG RX REV CODE 250: Performed by: EMERGENCY MEDICINE

## 2020-02-22 PROCEDURE — 770020 HCHG ROOM/CARE - TELE (206)

## 2020-02-22 PROCEDURE — 84484 ASSAY OF TROPONIN QUANT: CPT

## 2020-02-22 PROCEDURE — 96374 THER/PROPH/DIAG INJ IV PUSH: CPT

## 2020-02-22 PROCEDURE — 94640 AIRWAY INHALATION TREATMENT: CPT

## 2020-02-22 PROCEDURE — A9270 NON-COVERED ITEM OR SERVICE: HCPCS | Performed by: INTERNAL MEDICINE

## 2020-02-22 PROCEDURE — 94760 N-INVAS EAR/PLS OXIMETRY 1: CPT

## 2020-02-22 PROCEDURE — 99223 1ST HOSP IP/OBS HIGH 75: CPT | Mod: AI | Performed by: INTERNAL MEDICINE

## 2020-02-22 PROCEDURE — 85025 COMPLETE CBC W/AUTO DIFF WBC: CPT

## 2020-02-22 PROCEDURE — 93005 ELECTROCARDIOGRAM TRACING: CPT | Performed by: INTERNAL MEDICINE

## 2020-02-22 PROCEDURE — 700102 HCHG RX REV CODE 250 W/ 637 OVERRIDE(OP): Performed by: INTERNAL MEDICINE

## 2020-02-22 PROCEDURE — 83690 ASSAY OF LIPASE: CPT

## 2020-02-22 PROCEDURE — 93005 ELECTROCARDIOGRAM TRACING: CPT | Performed by: EMERGENCY MEDICINE

## 2020-02-22 PROCEDURE — 700101 HCHG RX REV CODE 250: Performed by: INTERNAL MEDICINE

## 2020-02-22 PROCEDURE — 99285 EMERGENCY DEPT VISIT HI MDM: CPT

## 2020-02-22 PROCEDURE — 80053 COMPREHEN METABOLIC PANEL: CPT

## 2020-02-22 PROCEDURE — 71045 X-RAY EXAM CHEST 1 VIEW: CPT

## 2020-02-22 PROCEDURE — 83880 ASSAY OF NATRIURETIC PEPTIDE: CPT

## 2020-02-22 PROCEDURE — 700111 HCHG RX REV CODE 636 W/ 250 OVERRIDE (IP): Performed by: INTERNAL MEDICINE

## 2020-02-22 PROCEDURE — 85610 PROTHROMBIN TIME: CPT

## 2020-02-22 PROCEDURE — 82607 VITAMIN B-12: CPT

## 2020-02-22 PROCEDURE — 700111 HCHG RX REV CODE 636 W/ 250 OVERRIDE (IP): Performed by: EMERGENCY MEDICINE

## 2020-02-22 PROCEDURE — 85730 THROMBOPLASTIN TIME PARTIAL: CPT

## 2020-02-22 RX ORDER — GUAIFENESIN 600 MG/1
1200 TABLET, EXTENDED RELEASE ORAL EVERY 12 HOURS
Status: DISCONTINUED | OUTPATIENT
Start: 2020-02-22 | End: 2020-02-26 | Stop reason: HOSPADM

## 2020-02-22 RX ORDER — BISACODYL 10 MG
10 SUPPOSITORY, RECTAL RECTAL
Status: DISCONTINUED | OUTPATIENT
Start: 2020-02-22 | End: 2020-02-26 | Stop reason: HOSPADM

## 2020-02-22 RX ORDER — IPRATROPIUM BROMIDE AND ALBUTEROL SULFATE 2.5; .5 MG/3ML; MG/3ML
3 SOLUTION RESPIRATORY (INHALATION)
Status: DISCONTINUED | OUTPATIENT
Start: 2020-02-22 | End: 2020-02-23

## 2020-02-22 RX ORDER — ONDANSETRON 2 MG/ML
4 INJECTION INTRAMUSCULAR; INTRAVENOUS EVERY 4 HOURS PRN
Status: DISCONTINUED | OUTPATIENT
Start: 2020-02-22 | End: 2020-02-26 | Stop reason: HOSPADM

## 2020-02-22 RX ORDER — ONDANSETRON 4 MG/1
4 TABLET, ORALLY DISINTEGRATING ORAL EVERY 4 HOURS PRN
Status: DISCONTINUED | OUTPATIENT
Start: 2020-02-22 | End: 2020-02-26 | Stop reason: HOSPADM

## 2020-02-22 RX ORDER — POLYETHYLENE GLYCOL 3350 17 G/17G
1 POWDER, FOR SOLUTION ORAL
Status: DISCONTINUED | OUTPATIENT
Start: 2020-02-22 | End: 2020-02-26 | Stop reason: HOSPADM

## 2020-02-22 RX ORDER — AMOXICILLIN 250 MG
2 CAPSULE ORAL 2 TIMES DAILY
Status: DISCONTINUED | OUTPATIENT
Start: 2020-02-22 | End: 2020-02-26 | Stop reason: HOSPADM

## 2020-02-22 RX ORDER — FUROSEMIDE 10 MG/ML
80 INJECTION INTRAMUSCULAR; INTRAVENOUS ONCE
Status: COMPLETED | OUTPATIENT
Start: 2020-02-22 | End: 2020-02-22

## 2020-02-22 RX ORDER — ACETAMINOPHEN 325 MG/1
650 TABLET ORAL EVERY 6 HOURS PRN
Status: DISCONTINUED | OUTPATIENT
Start: 2020-02-22 | End: 2020-02-26 | Stop reason: HOSPADM

## 2020-02-22 RX ORDER — IPRATROPIUM BROMIDE AND ALBUTEROL SULFATE 2.5; .5 MG/3ML; MG/3ML
3 SOLUTION RESPIRATORY (INHALATION)
Status: DISCONTINUED | OUTPATIENT
Start: 2020-02-22 | End: 2020-02-22

## 2020-02-22 RX ORDER — METHYLPREDNISOLONE SODIUM SUCCINATE 125 MG/2ML
125 INJECTION, POWDER, LYOPHILIZED, FOR SOLUTION INTRAMUSCULAR; INTRAVENOUS EVERY 6 HOURS
Status: DISCONTINUED | OUTPATIENT
Start: 2020-02-22 | End: 2020-02-26

## 2020-02-22 RX ADMIN — FUROSEMIDE 80 MG: 10 INJECTION, SOLUTION INTRAMUSCULAR; INTRAVENOUS at 15:45

## 2020-02-22 RX ADMIN — METHYLPREDNISOLONE SODIUM SUCCINATE 125 MG: 125 INJECTION, POWDER, FOR SOLUTION INTRAMUSCULAR; INTRAVENOUS at 23:43

## 2020-02-22 RX ADMIN — IPRATROPIUM BROMIDE AND ALBUTEROL SULFATE 3 ML: .5; 3 SOLUTION RESPIRATORY (INHALATION) at 20:21

## 2020-02-22 RX ADMIN — IPRATROPIUM BROMIDE 0.5 MG: 0.5 SOLUTION RESPIRATORY (INHALATION) at 16:35

## 2020-02-22 RX ADMIN — METHYLPREDNISOLONE SODIUM SUCCINATE 125 MG: 125 INJECTION, POWDER, FOR SOLUTION INTRAMUSCULAR; INTRAVENOUS at 18:37

## 2020-02-22 RX ADMIN — ALBUTEROL SULFATE 2.5 MG: 2.5 SOLUTION RESPIRATORY (INHALATION) at 16:35

## 2020-02-22 RX ADMIN — GUAIFENESIN 1200 MG: 600 TABLET, EXTENDED RELEASE ORAL at 18:49

## 2020-02-22 ASSESSMENT — COGNITIVE AND FUNCTIONAL STATUS - GENERAL
MOVING TO AND FROM BED TO CHAIR: A LOT
DAILY ACTIVITIY SCORE: 12
TURNING FROM BACK TO SIDE WHILE IN FLAT BAD: A LITTLE
STANDING UP FROM CHAIR USING ARMS: A LOT
SUGGESTED CMS G CODE MODIFIER MOBILITY: CL
TOILETING: A LOT
MOVING FROM LYING ON BACK TO SITTING ON SIDE OF FLAT BED: A LOT
DRESSING REGULAR LOWER BODY CLOTHING: A LOT
DRESSING REGULAR UPPER BODY CLOTHING: A LOT
MOBILITY SCORE: 11
EATING MEALS: A LOT
SUGGESTED CMS G CODE MODIFIER DAILY ACTIVITY: CL
HELP NEEDED FOR BATHING: A LOT
WALKING IN HOSPITAL ROOM: TOTAL
PERSONAL GROOMING: A LOT
CLIMB 3 TO 5 STEPS WITH RAILING: TOTAL

## 2020-02-22 ASSESSMENT — LIFESTYLE VARIABLES
TOTAL SCORE: 0
HAVE PEOPLE ANNOYED YOU BY CRITICIZING YOUR DRINKING: NO
ALCOHOL_USE: NO
ON A TYPICAL DAY WHEN YOU DRINK ALCOHOL HOW MANY DRINKS DO YOU HAVE: 0
DOES PATIENT WANT TO STOP DRINKING: NO
EVER_SMOKED: YES
HAVE YOU EVER FELT YOU SHOULD CUT DOWN ON YOUR DRINKING: NO
CONSUMPTION TOTAL: NEGATIVE
TOTAL SCORE: 0
EVER HAD A DRINK FIRST THING IN THE MORNING TO STEADY YOUR NERVES TO GET RID OF A HANGOVER: NO
EVER FELT BAD OR GUILTY ABOUT YOUR DRINKING: NO
HOW MANY TIMES IN THE PAST YEAR HAVE YOU HAD 5 OR MORE DRINKS IN A DAY: 0
EVER_SMOKED: YES
TOTAL SCORE: 0
AVERAGE NUMBER OF DAYS PER WEEK YOU HAVE A DRINK CONTAINING ALCOHOL: 0

## 2020-02-22 ASSESSMENT — ENCOUNTER SYMPTOMS
FALLS: 0
VOMITING: 0
FEVER: 0
NAUSEA: 0
HEADACHES: 0
CONSTIPATION: 0
EYE DISCHARGE: 0
WEAKNESS: 1
ABDOMINAL PAIN: 0
SORE THROAT: 0
DIARRHEA: 0
NERVOUS/ANXIOUS: 1
DIZZINESS: 0
COUGH: 1
SHORTNESS OF BREATH: 1
CHILLS: 1
EYE REDNESS: 0
WHEEZING: 0
DIAPHORESIS: 0
SPUTUM PRODUCTION: 0

## 2020-02-22 ASSESSMENT — COPD QUESTIONNAIRES
HAVE YOU SMOKED AT LEAST 100 CIGARETTES IN YOUR ENTIRE LIFE: YES
COPD SCREENING SCORE: 7
DO YOU EVER COUGH UP ANY MUCUS OR PHLEGM?: NO/ONLY WITH OCCASIONAL COLDS OR INFECTIONS
DURING THE PAST 4 WEEKS HOW MUCH DID YOU FEEL SHORT OF BREATH: MOST  OR ALL OF THE TIME

## 2020-02-22 NOTE — ED PROVIDER NOTES
ED Provider Note    ED Provider Note      Primary care provider: Ihsan Monet M.D.    CHIEF COMPLAINT  Chief Complaint   Patient presents with   • Shortness of Breath   • Abdominal Pain       HPI  Jhon Begum is a 89 y.o. male who presents to the Emergency Department with chief complaint of shortness of breath/abdominal/chest pain.  Patient's family members went to check on him this morning and found him bent over in a chair and was hardly breathing at that time.  EMS was called patient was found to be 77% on room air.  He reports bandlike pressure in his chest that is been building up over the last several days and progressive shortness of breath.  Both the pressure in his chest as well as her shortness of breath are much worse with exertion.  He does have a history of COPD and CHF.  He denies any fevers chills or cough he denies any lower extremity swelling he is unclear whether he is had any weight gain.  No nausea vomiting constipation diarrhea no other acute symptoms or concerns at this time.    REVIEW OF SYSTEMS  10 systems reviewed and otherwise negative, pertinent positives and negatives listed in the history of present illness.    PAST MEDICAL HISTORY   has a past medical history of ASTHMA, Congestive heart failure (HCC), Emphysema lung (HCC) (), and Pain.    SURGICAL HISTORY   has a past surgical history that includes other (); other (); hip arthroplasty total (3/31/08); other (); and other abdominal surgery.    SOCIAL HISTORY  Social History     Tobacco Use   • Smoking status: Former Smoker     Packs/day: 0.00     Years: 60.00     Pack years: 0.00     Types: Cigarettes     Last attempt to quit: 2010     Years since quittin.2   • Smokeless tobacco: Never Used   • Tobacco comment: 2 packs per week   Substance Use Topics   • Alcohol use: Yes     Comment: 1 DAILY   • Drug use: No      Social History     Substance and Sexual Activity   Drug Use No       FAMILY  "HISTORY  Non-Contributory    CURRENT MEDICATIONS  Home Medications     Reviewed by Denice Patton (Pharmacy Tech) on 02/22/20 at 1314  Med List Status: Complete   Medication Last Dose Status   aspirin EC (ECOTRIN) 81 MG Tablet Delayed Response UNK Active   Cholecalciferol (D3 ADULT PO) 2/22/2020 Active   enalapril (VASOTEC) 5 MG Tab 2/22/2020 Active                ALLERGIES  No Known Allergies    PHYSICAL EXAM  VITAL SIGNS: /96   Pulse 90   Temp 35.9 °C (96.7 °F) (Oral)   Resp 18   Ht 1.702 m (5' 7\")   Wt 49 kg (108 lb 0.4 oz)   SpO2 96%   BMI 16.92 kg/m²   Pulse ox interpretation: I interpret this pulse ox as normal.  Constitutional: Alert and oriented x 3, minimal distress  HEENT: Atraumatic normocephalic, pupils are equal round reactive to light extraocular movements are intact. The nares is clear, external ears are normal, mouth shows moist mucous membranes  Neck: Supple, no JVD no tracheal deviation  Cardiovascular: Regular rate and rhythm no murmur rub or gallop 2+ pulses peripherally x4  Thorax & Lungs tachypneic,  decreased breath sounds in the bases, No chest tenderness.   GI: Soft nontender nondistended positive bowel sounds, no peritoneal signs  Skin: Warm dry no acute rash or lesion  Musculoskeletal: Moving all extremities with full range and 5 of 5 strength, no acute  deformity, no peripheral edema  Neurologic: Cranial nerves III through XII are grossly intact, no sensory deficit, no cerebellar dysfunction   Psychiatric: Appropriate affect for situation at this time      DIAGNOSTIC STUDIES / PROCEDURES  LABS      Results for orders placed or performed during the hospital encounter of 02/22/20   CBC w/ Differential   Result Value Ref Range    WBC 7.1 4.8 - 10.8 K/uL    RBC 3.81 (L) 4.70 - 6.10 M/uL    Hemoglobin 12.6 (L) 14.0 - 18.0 g/dL    Hematocrit 39.4 (L) 42.0 - 52.0 %    .4 (H) 81.4 - 97.8 fL    MCH 33.1 (H) 27.0 - 33.0 pg    MCHC 32.0 (L) 33.7 - 35.3 g/dL    RDW 52.8 (H) " 35.9 - 50.0 fL    Platelet Count 122 (L) 164 - 446 K/uL    MPV 9.6 9.0 - 12.9 fL    Neutrophils-Polys 76.50 (H) 44.00 - 72.00 %    Lymphocytes 11.20 (L) 22.00 - 41.00 %    Monocytes 10.20 0.00 - 13.40 %    Eosinophils 0.80 0.00 - 6.90 %    Basophils 0.30 0.00 - 1.80 %    Immature Granulocytes 1.00 (H) 0.00 - 0.90 %    Nucleated RBC 0.00 /100 WBC    Neutrophils (Absolute) 5.41 1.82 - 7.42 K/uL    Lymphs (Absolute) 0.79 (L) 1.00 - 4.80 K/uL    Monos (Absolute) 0.72 0.00 - 0.85 K/uL    Eos (Absolute) 0.06 0.00 - 0.51 K/uL    Baso (Absolute) 0.02 0.00 - 0.12 K/uL    Immature Granulocytes (abs) 0.07 0.00 - 0.11 K/uL    NRBC (Absolute) 0.00 K/uL   Complete Metabolic Panel (CMP)   Result Value Ref Range    Sodium 135 135 - 145 mmol/L    Potassium 4.8 3.6 - 5.5 mmol/L    Chloride 97 96 - 112 mmol/L    Co2 25 20 - 33 mmol/L    Anion Gap 13.0 7.0 - 16.0    Glucose 105 (H) 65 - 99 mg/dL    Bun 19 8 - 22 mg/dL    Creatinine 0.82 0.50 - 1.40 mg/dL    Calcium 9.1 8.4 - 10.2 mg/dL    AST(SGOT) 20 12 - 45 U/L    ALT(SGPT) 11 2 - 50 U/L    Alkaline Phosphatase 68 30 - 99 U/L    Total Bilirubin 0.8 0.1 - 1.5 mg/dL    Albumin 3.9 3.2 - 4.9 g/dL    Total Protein 6.0 6.0 - 8.2 g/dL    Globulin 2.1 1.9 - 3.5 g/dL    A-G Ratio 1.9 g/dL   Troponin STAT   Result Value Ref Range    Troponin T 32 (H) 6 - 19 ng/L   PT/INR   Result Value Ref Range    PT 13.7 12.0 - 14.6 sec    INR 1.04 0.87 - 1.13   PTT   Result Value Ref Range    APTT 25.3 24.7 - 36.0 sec   ESTIMATED GFR   Result Value Ref Range    GFR If African American >60 >60 mL/min/1.73 m 2    GFR If Non African American >60 >60 mL/min/1.73 m 2   LIPASE   Result Value Ref Range    Lipase 13 7 - 58 U/L   proBrain Natriuretic Peptide, NT   Result Value Ref Range    NT-proBNP 56610 (H) 0 - 125 pg/mL   VITAMIN B12   Result Value Ref Range    Vitamin B12 -True Cobalamin 138 (L) 211 - 911 pg/mL   EKG   Result Value Ref Range    Report       [facility] Emergency  Dept.    Test Date:  2020  Pt Name:    YANELI MEZA                   Department: EDSM  MRN:        [id number]                      Room:       3301  Gender:     Male                         Technician: HRS  :        1927                   Requested By:ANNIE REEDER  Order #:    [id number]                    Reading MD: ANNIE REEDER MD    Measurements  Intervals                                Axis  Rate:       88                           P:          42  MS:         172                          QRS:        -39  QRSD:       76                           T:          102  QT:         384  QTc:        465    Interpretive Statements  SINUS RHYTHM  LEFT AXIS DEVIATION  PROBABLE LEFT VENTRICULAR HYPERTROPHY  Compared to ECG 2020 23:07:05  Left-axis deviation now present  Myocardial infarct finding no longer present  Electronically Signed On 2020 22:58:53 PST by ANNIE REEDER MD     EKG   Result Value Ref Range    Report       [facility] Emergency Dept.    Test Date:  2020  Pt Name:    YANELI MEZA                   Department: MED  MRN:        [id number]                      Room:       3301  Gender:     Male                         Technician:   :        [id number]                   Requested By:ARNOLD DUONG  Order #:    [id number]                    Reading MD:    Measurements  Intervals                                Axis  Rate:       98                           P:          77  MS:         148                          QRS:        -35  QRSD:       86                           T:          85  QT:         392  QTc:        501    Interpretive Statements  SINUS RHYTHM  PROBABLE LEFT ATRIAL ABNORMALITY  LEFT AXIS DEVIATION  CONSIDER ANTEROSEPTAL INFARCT  NONSPECIFIC T ABNORMALITIES, LATERAL LEADS  PROLONGED QT INTERVAL  Compared to ECG 2020 14:01:47  Myocardial infarct finding now present  T-wave abnormality now present  Prolonged QT interval now  "present         All labs reviewed by me.      RADIOLOGY  DX-CHEST-PORTABLE (1 VIEW)    (Results Pending)     The radiologist's interpretation of all radiological studies have been reviewed by me.    COURSE & MEDICAL DECISION MAKING  Pertinent Labs & Imaging studies reviewed. (See chart for details)    1:41 PM - Patient seen and examined at bedside.         Patient noted to have slightly elevated blood pressure likely circumstantial secondary to presenting complaint. Referred to primary care physician for further evaluation.      Medical Decision Making: History of COPD CHF.  Labs suggest volume overload.  Patient was given 80 mg of Lasix in the emergency department will be admitted for further evaluation treatment likely aggressive diuresis.  I discussed this with Dr. Wagoner and patient is admitted in guarded condition.    /96   Pulse 90   Temp 35.9 °C (96.7 °F) (Oral)   Resp 18   Ht 1.702 m (5' 7\")   Wt 49 kg (108 lb 0.4 oz)   SpO2 96%   BMI 16.92 kg/m²             FINAL IMPRESSION  1. Hypervolemia, unspecified hypervolemia type           This dictation has been created using voice recognition software and/or scribes. The accuracy of the dictation is limited by the abilities of the software and the expertise of the scribes. I expect there may be some errors of grammar and possibly content. I made every attempt to manually correct the errors within my dictation. However, errors related to voice recognition software and/or scribes may still exist and should be interpreted within the appropriate context.            "

## 2020-02-22 NOTE — ED TRIAGE NOTES
EMS called for epigastric pain and SOB. Patient found at home w/o o2 satting 77%, wears 4L o2 at baseline

## 2020-02-23 ENCOUNTER — HOME CARE VISIT (OUTPATIENT)
Dept: HOME HEALTH SERVICES | Facility: HOME HEALTHCARE | Age: 85
End: 2020-02-23
Payer: MEDICARE

## 2020-02-23 LAB
ANION GAP SERPL CALC-SCNC: 11 MMOL/L (ref 7–16)
BUN SERPL-MCNC: 19 MG/DL (ref 8–22)
CALCIUM SERPL-MCNC: 8.7 MG/DL (ref 8.4–10.2)
CHLORIDE SERPL-SCNC: 97 MMOL/L (ref 96–112)
CO2 SERPL-SCNC: 29 MMOL/L (ref 20–33)
CREAT SERPL-MCNC: 0.94 MG/DL (ref 0.5–1.4)
GLUCOSE SERPL-MCNC: 164 MG/DL (ref 65–99)
POTASSIUM SERPL-SCNC: 4.1 MMOL/L (ref 3.6–5.5)
SODIUM SERPL-SCNC: 137 MMOL/L (ref 135–145)

## 2020-02-23 PROCEDURE — 700102 HCHG RX REV CODE 250 W/ 637 OVERRIDE(OP): Performed by: INTERNAL MEDICINE

## 2020-02-23 PROCEDURE — 770020 HCHG ROOM/CARE - TELE (206)

## 2020-02-23 PROCEDURE — 700111 HCHG RX REV CODE 636 W/ 250 OVERRIDE (IP): Performed by: INTERNAL MEDICINE

## 2020-02-23 PROCEDURE — 94760 N-INVAS EAR/PLS OXIMETRY 1: CPT

## 2020-02-23 PROCEDURE — 94640 AIRWAY INHALATION TREATMENT: CPT

## 2020-02-23 PROCEDURE — 700101 HCHG RX REV CODE 250: Performed by: INTERNAL MEDICINE

## 2020-02-23 PROCEDURE — 80048 BASIC METABOLIC PNL TOTAL CA: CPT

## 2020-02-23 PROCEDURE — 99233 SBSQ HOSP IP/OBS HIGH 50: CPT | Mod: 25 | Performed by: INTERNAL MEDICINE

## 2020-02-23 PROCEDURE — A9270 NON-COVERED ITEM OR SERVICE: HCPCS | Performed by: INTERNAL MEDICINE

## 2020-02-23 PROCEDURE — 99497 ADVNCD CARE PLAN 30 MIN: CPT | Performed by: INTERNAL MEDICINE

## 2020-02-23 RX ORDER — IPRATROPIUM BROMIDE AND ALBUTEROL SULFATE 2.5; .5 MG/3ML; MG/3ML
3 SOLUTION RESPIRATORY (INHALATION)
Status: DISCONTINUED | OUTPATIENT
Start: 2020-02-24 | End: 2020-02-25

## 2020-02-23 RX ORDER — POLYMYXIN B SULFATE AND TRIMETHOPRIM 1; 10000 MG/ML; [USP'U]/ML
1 SOLUTION OPHTHALMIC
Status: DISCONTINUED | OUTPATIENT
Start: 2020-02-23 | End: 2020-02-24

## 2020-02-23 RX ORDER — FUROSEMIDE 10 MG/ML
40 INJECTION INTRAMUSCULAR; INTRAVENOUS
Status: COMPLETED | OUTPATIENT
Start: 2020-02-23 | End: 2020-02-24

## 2020-02-23 RX ADMIN — METHYLPREDNISOLONE SODIUM SUCCINATE 125 MG: 125 INJECTION, POWDER, FOR SOLUTION INTRAMUSCULAR; INTRAVENOUS at 12:27

## 2020-02-23 RX ADMIN — POLYMYXIN B SULFATE AND TRIMETHOPRIM SULFATE 1 DROP: 10000; 1 SOLUTION/ DROPS OPHTHALMIC at 15:36

## 2020-02-23 RX ADMIN — METHYLPREDNISOLONE SODIUM SUCCINATE 125 MG: 125 INJECTION, POWDER, FOR SOLUTION INTRAMUSCULAR; INTRAVENOUS at 17:21

## 2020-02-23 RX ADMIN — SENNOSIDES AND DOCUSATE SODIUM 2 TABLET: 8.6; 5 TABLET ORAL at 17:21

## 2020-02-23 RX ADMIN — FUROSEMIDE 40 MG: 10 INJECTION, SOLUTION INTRAVENOUS at 10:39

## 2020-02-23 RX ADMIN — GUAIFENESIN 1200 MG: 600 TABLET, EXTENDED RELEASE ORAL at 17:21

## 2020-02-23 RX ADMIN — POLYMYXIN B SULFATE AND TRIMETHOPRIM SULFATE 1 DROP: 10000; 1 SOLUTION/ DROPS OPHTHALMIC at 20:42

## 2020-02-23 RX ADMIN — IPRATROPIUM BROMIDE AND ALBUTEROL SULFATE 3 ML: .5; 3 SOLUTION RESPIRATORY (INHALATION) at 14:12

## 2020-02-23 RX ADMIN — METHYLPREDNISOLONE SODIUM SUCCINATE 125 MG: 125 INJECTION, POWDER, FOR SOLUTION INTRAMUSCULAR; INTRAVENOUS at 23:15

## 2020-02-23 RX ADMIN — IPRATROPIUM BROMIDE AND ALBUTEROL SULFATE 3 ML: .5; 3 SOLUTION RESPIRATORY (INHALATION) at 10:06

## 2020-02-23 RX ADMIN — IPRATROPIUM BROMIDE AND ALBUTEROL SULFATE 3 ML: .5; 3 SOLUTION RESPIRATORY (INHALATION) at 06:38

## 2020-02-23 RX ADMIN — FUROSEMIDE 40 MG: 10 INJECTION, SOLUTION INTRAVENOUS at 15:36

## 2020-02-23 RX ADMIN — POLYMYXIN B SULFATE AND TRIMETHOPRIM SULFATE 1 DROP: 10000; 1 SOLUTION/ DROPS OPHTHALMIC at 10:41

## 2020-02-23 RX ADMIN — GUAIFENESIN 1200 MG: 600 TABLET, EXTENDED RELEASE ORAL at 06:01

## 2020-02-23 RX ADMIN — METHYLPREDNISOLONE SODIUM SUCCINATE 125 MG: 125 INJECTION, POWDER, FOR SOLUTION INTRAMUSCULAR; INTRAVENOUS at 06:01

## 2020-02-23 ASSESSMENT — ENCOUNTER SYMPTOMS
MYALGIAS: 0
DIZZINESS: 0
FEVER: 0
PALPITATIONS: 0
DIAPHORESIS: 0
NERVOUS/ANXIOUS: 0
DEPRESSION: 0
WEAKNESS: 0
COUGH: 1
TREMORS: 0
SHORTNESS OF BREATH: 1
SINUS PAIN: 0
DIARRHEA: 0
VOMITING: 0
WHEEZING: 0
ABDOMINAL PAIN: 0
SENSORY CHANGE: 0
ORTHOPNEA: 0
STRIDOR: 0
CHILLS: 0
NAUSEA: 0
INSOMNIA: 0

## 2020-02-23 NOTE — PROGRESS NOTES
2 RN Skin Check    2 RN skin check complete.   Devices in place: Nasal Cannula.  Skin assessed under devices: yes.  Confirmed pressure ulcers found on: none.  New potential pressure ulcers noted on none. Wound consult placed N/A.  The following interventions in place Pillows, Mepilex, Barrier cream and Waffle bed overlay.  Non blanching redness on bottom, education provided  Bruising throughout, bruise on right fourth toe

## 2020-02-23 NOTE — H&P
Hospital Medicine History & Physical Note    Date of Service  2/22/2020    Primary Care Physician  Ihsan Monet M.D.    Consultants  none    Code Status  DNR/I    Chief Complaint  SOB    History of Presenting Illness  89 y.o. male who presented 2/22/2020 with worsening shortness of breath.  He has a history of COPD, diastolic heart failure, was recently treated for pneumonia.  There was talk during his last hospitalization of going home on hospice but they decided ultimately to send him to rehab.  Patient has been at home he states he is alone much of the time and feels too weak to get around or get anything to eat.  He is become progressively more short of breath and feels like something is crushing his chest and he cannot breathe.  Despite this he is not consistent with wearing his O2 at home and only wears it intermittently throughout the day.  He has a mild cough but it is nonproductive, he denies wheezing but it is audible at bedside.  He has not noted any fever or chills.  He denies any falls in recent weeks, but has had them prior.  He has been quite weak, his appetite has been poor.  He has noted increased swelling in his legs.  He has extensive ecchymoses over his chest but he is unable to say how these occurred.    Review of Systems  Review of Systems   Constitutional: Positive for chills and malaise/fatigue. Negative for diaphoresis and fever.   HENT: Negative for congestion and sore throat.    Eyes: Negative for discharge and redness.   Respiratory: Positive for cough and shortness of breath. Negative for sputum production and wheezing.    Cardiovascular: Positive for chest pain and leg swelling.   Gastrointestinal: Negative for abdominal pain, constipation, diarrhea, nausea and vomiting.   Genitourinary: Negative for dysuria.   Musculoskeletal: Negative for falls and joint pain.   Skin: Negative for itching and rash.   Neurological: Positive for weakness (States he cannot walk because he cannot  breathe). Negative for dizziness and headaches.   Psychiatric/Behavioral: The patient is nervous/anxious.        Past Medical History   has a past medical history of ASTHMA, Congestive heart failure (HCC), Emphysema lung (HCC) (2008), and Pain.    Surgical History   has a past surgical history that includes other (1969); other (1969); hip arthroplasty total (3/31/08); other (1969); and other abdominal surgery.  Brain surgery    Family History  family history includes Cancer in an other family member.  He denied family history of cancer diabetes or heart disease to me    Social History   reports that he quit smoking about 9 years ago. His smoking use included cigarettes. He smoked 0.00 packs per day for 60.00 years. He has never used smokeless tobacco. He reports current alcohol use. He reports that he does not use drugs.  Patient claims he currently lives alone he has 2 children that live in [place] and 2 in [place]    Allergies  No Known Allergies    Medications  Prior to Admission Medications   Prescriptions Last Dose Informant Patient Reported? Taking?   Cholecalciferol (D3 ADULT PO) 2/22/2020 at AM Patient Yes No   Sig: Take 1 Tab by mouth every day. Indications: supplement   aspirin EC (ECOTRIN) 81 MG Tablet Delayed Response UNK at UNK Patient Yes Yes   Sig: Take 81 mg by mouth 1 time daily as needed.   enalapril (VASOTEC) 5 MG Tab 2/22/2020 at AM Patient No No   Sig: Take 1 Tab by mouth every day.      Facility-Administered Medications: None       Physical Exam  Temp:  [35.9 °C (96.7 °F)] 35.9 °C (96.7 °F)  Pulse:  [] 100  Resp:  [16-18] 16  BP: (145-159)/(85-96) 145/85  SpO2:  [94 %-98 %] 94 %    Physical Exam  Vitals signs and nursing note reviewed.   Constitutional:       General: He is in acute distress (Appears quite dyspneic).      Appearance: He is ill-appearing. He is not toxic-appearing or diaphoretic.      Comments: Very frail and chronically ill-appearing   HENT:      Head: Normocephalic and  atraumatic.      Right Ear: External ear normal.      Left Ear: External ear normal.      Nose: Nose normal.      Mouth/Throat:      Mouth: Mucous membranes are dry.      Pharynx: Oropharynx is clear.      Comments: Mucosa dry likely due to mouth breathing  Eyes:      Extraocular Movements: Extraocular movements intact.      Conjunctiva/sclera: Conjunctivae normal.      Pupils: Pupils are equal, round, and reactive to light.   Cardiovascular:      Rate and Rhythm: Normal rate and regular rhythm.   Pulmonary:      Effort: No respiratory distress.      Breath sounds: Wheezing present. No rhonchi or rales.      Comments: Increased work of breathing, use of accessory muscles, very poor breath sounds with faint wheezes    Ecchymoses across anterior chest  Chest:      Chest wall: No tenderness.   Abdominal:      General: Abdomen is flat. Bowel sounds are normal. There is no distension.      Tenderness: There is no abdominal tenderness.      Comments: Scaphoid   Musculoskeletal:      Right lower leg: Edema present.      Left lower leg: Edema present.   Skin:     General: Skin is warm and dry.      Capillary Refill: Capillary refill takes less than 2 seconds.      Findings: Bruising present.   Neurological:      General: No focal deficit present.      Mental Status: He is alert and oriented to person, place, and time.      Motor: Weakness present.   Psychiatric:      Comments: Anxious due to dyspnea         Laboratory:  Recent Labs     02/22/20  1345   WBC 7.1   RBC 3.81*   HEMOGLOBIN 12.6*   HEMATOCRIT 39.4*   .4*   MCH 33.1*   MCHC 32.0*   RDW 52.8*   PLATELETCT 122*   MPV 9.6     Recent Labs     02/22/20  1345   SODIUM 135   POTASSIUM 4.8   CHLORIDE 97   CO2 25   GLUCOSE 105*   BUN 19   CREATININE 0.82   CALCIUM 9.1     Recent Labs     02/22/20  1345   ALTSGPT 11   ASTSGOT 20   ALKPHOSPHAT 68   TBILIRUBIN 0.8   LIPASE 13   GLUCOSE 105*     Recent Labs     02/22/20  1345   APTT 25.3   INR 1.04     Recent Labs      02/22/20  1345   NTPROBNP [id number]*         Recent Labs     02/22/20  1345   TROPONINT 32*       Urinalysis:    No results found     Imaging:  DX-CHEST-PORTABLE (1 VIEW)   Final Result      Cardiomegaly with interstitial edema.            Assessment/Plan:  I anticipate this patient will require at least two midnights for appropriate medical management, necessitating inpatient admission.    Chronic obstructive pulmonary disease with acute exacerbation (HCC)- (present on admission)  Assessment & Plan  Plan as noted    DNR (do not resuscitate)  Assessment & Plan  Patient was considered for hospice during his last hospitalization, he continues to do poorly  Consider reconsultation    Chronic diastolic (congestive) heart failure (HCC)  Assessment & Plan  He has mild pedal edema but overtly I do not think he is an exacerbation at this time he was given 1 dose of Lasix in the ER    Acute on chronic respiratory failure with hypoxemia (HCC)- (present on admission)  Assessment & Plan  Due to exacerbation of COPD, he has very poor air movement  Start IV steroids, expectorants, RT        VTE prophylaxis: SCDs

## 2020-02-23 NOTE — DISCHARGE PLANNING
*ATTN Discharge Planning team: This patient is currently on service with [facility]. Please submit a referral order and face-to-face prior to discharging the patient home. If you have any questions, contact our Transitional Care Specialist team at x [phone].

## 2020-02-23 NOTE — PROGRESS NOTES
Hospital Medicine Daily Progress Note    Date of Service  2/23/2020    Chief Complaint  89 y.o. male admitted 2/22/2020 with chest tightness    Hospital Course    This is a 89 y.o. male who presented 2/22/2020 with worsening shortness of breath.  He has a history of COPD, diastolic heart failure, was recently treated for pneumonia.  There was talk during his last hospitalization of going home on hospice but decision by [place] was to take him home with home health.  Patient has been at home he states he is alone much of the time and feels too weak to get around or get anything to eat.  He is become progressively more short of breath and has chest tightness.  He is also not consistent with wearing his O2 at home and only wears it intermittently throughout the day.        Interval Problem Update  The patient is inconsistent in giving his recent and past medical history, he states he walked yesterday and when the question is reworded he states he hasnt walked in two years.  Chest tight ness persists    Consultants/Specialty  none    Code Status  DNR    Disposition  Tbd, hospice?    Review of Systems  Review of Systems   Constitutional: Positive for malaise/fatigue. Negative for chills, diaphoresis and fever.   HENT: Negative for congestion and sinus pain.    Respiratory: Positive for cough and shortness of breath. Negative for wheezing and stridor.    Cardiovascular: Positive for chest pain. Negative for palpitations, orthopnea and leg swelling.   Gastrointestinal: Negative for abdominal pain, diarrhea, nausea and vomiting.   Genitourinary: Negative for dysuria, frequency and urgency.   Musculoskeletal: Negative for myalgias.   Skin: Negative for itching and rash.   Neurological: Negative for dizziness, tremors, sensory change and weakness.   Psychiatric/Behavioral: Negative for depression. The patient is not nervous/anxious and does not have insomnia.         Physical Exam  Temp:  [35.9 °C (96.7 °F)-36.7 °C (98 °F)] 36.6  °C (97.9 °F)  Pulse:  [] 91  Resp:  [15-20] 15  BP: (110-159)/() 133/68  SpO2:  [93 %-99 %] 95 %    Physical Exam  Vitals signs and nursing note reviewed.   Constitutional:       Appearance: He is ill-appearing. He is not diaphoretic.   HENT:      Nose: No congestion or rhinorrhea.      Mouth/Throat:      Mouth: Mucous membranes are dry.      Pharynx: No oropharyngeal exudate or posterior oropharyngeal erythema.   Eyes:      General: No scleral icterus.     Extraocular Movements: Extraocular movements intact.      Comments: Crusty discharge in eyes   Neck:      Musculoskeletal: Neck supple. No muscular tenderness.   Cardiovascular:      Rate and Rhythm: Normal rate and regular rhythm.      Pulses: Normal pulses.      Heart sounds: Heart sounds are distant. No murmur.   Pulmonary:      Effort: No respiratory distress.      Breath sounds: Decreased air movement present. Decreased breath sounds, wheezing and rhonchi present.   Abdominal:      General: Bowel sounds are normal. There is no distension.      Tenderness: There is no abdominal tenderness.   Musculoskeletal:         General: No swelling or signs of injury.   Skin:     General: Skin is warm and dry.      Capillary Refill: Capillary refill takes less than 2 seconds.      Coloration: Skin is not jaundiced or pale.      Findings: No bruising, erythema or rash.      Comments: Bruising over chest, under arms   Neurological:      Mental Status: He is alert and oriented to person, place, and time.      Motor: Weakness present.   Psychiatric:         Mood and Affect: Mood normal.         Thought Content: Thought content normal.         Cognition and Memory: Cognition is impaired. Memory is impaired.         Fluids    Intake/Output Summary (Last 24 hours) at 2/23/2020 0940  Last data filed at 2/23/2020 0823  Gross per 24 hour   Intake 360 ml   Output 3400 ml   Net -3040 ml       Laboratory  Recent Labs     02/22/20  1345   WBC 7.1   RBC 3.81*   HEMOGLOBIN  12.6*   HEMATOCRIT 39.4*   .4*   MCH 33.1*   MCHC 32.0*   RDW 52.8*   PLATELETCT 122*   MPV 9.6     Recent Labs     02/22/20  1345 02/23/20  0302   SODIUM 135 137   POTASSIUM 4.8 4.1   CHLORIDE 97 97   CO2 25 29   GLUCOSE 105* 164*   BUN 19 19   CREATININE 0.82 0.94   CALCIUM 9.1 8.7     Recent Labs     02/22/20  1345   APTT 25.3   INR 1.04               Imaging  DX-CHEST-PORTABLE (1 VIEW)   Final Result      Cardiomegaly with interstitial edema.           Assessment/Plan  Chronic obstructive pulmonary disease with acute exacerbation (HCC)- (present on admission)  Assessment & Plan  Steroids, oxygen, RT    DNR (do not resuscitate)  Assessment & Plan  Hospice ordered    Chronic diastolic (congestive) heart failure (HCC)  Assessment & Plan  With exacerbation  He has mild pedal edema, crackles on exam and chest xray is reviewed by myself showing interstitial edema, will add iv lasix    Acute on chronic respiratory failure with hypoxemia (HCC)- (present on admission)  Assessment & Plan  Due to exacerbation of COPD, he has very poor air movement  continue IV steroids, expectorants, RT  Will order hospice consultation, patient is noncompliant with oxygen at home and medications  He is also a poor historian       Discussed the patient with his son today, Moi, and the patient does not ambulate at his baseline  He has asked for snf with hospice, will order for this, I spent from 9:22 until 10:09 in discussion with Moi about care of the patient, goals and end of life  VTE prophylaxis: heparin

## 2020-02-23 NOTE — FLOWSHEET NOTE
02/23/20 1006   Events/Summary/Plan   Events/Summary/Plan SVN given.   Vital Signs   Pulse 90   Respiration 18   Breath Sounds   RUL Breath Sounds Clear;Diminished   RML Breath Sounds Clear;Diminished   RLL Breath Sounds Diminished   CARROLL Breath Sounds Crackles   LLL Breath Sounds Crackles;Diminished   Secretions   Cough Congested;Non Productive;Strong   How Sputum Obtained Spontaneous

## 2020-02-23 NOTE — ASSESSMENT & PLAN NOTE
With exacerbation  Continue iv lasix, after discussion with his son Moi, the patient had been off his lasix at home as his blood pressure was low

## 2020-02-23 NOTE — PROGRESS NOTES
Pt arrives to unit from ER via gurney. Ambulated from gurney to bed, accompanied by staff. PT A&Ox4. Tele monitor applied, vitals taken. History, allergies and med rec reviewed and admission profile completed.    Pt oriented to unit and POC discussed. Welcome folder provided and discussed, Communication board filled out. Pt instructed on call light usage and encouraged to call for any questions, needs or concerns and prior to getting out of bed. Fall precautions in place. Pt agrees with the plan and declines any needs at this time. Bed locked and low and bed alarm on.

## 2020-02-23 NOTE — FLOWSHEET NOTE
02/22/20 1643   Events/Summary/Plan   Events/Summary/Plan tx given   Vital Signs   Pulse 100   Respiration 16   Pulse Oximetry 94 %   Respiratory Assessment   Level of Consciousness Alert   Breath Sounds   RUL Breath Sounds Expiratory Wheezes   RML Breath Sounds Expiratory Wheezes   RLL Breath Sounds Diminished   CARROLL Breath Sounds Expiratory Wheezes   LLL Breath Sounds Diminished   Oxygen   O2 (LPM) 4   O2 Delivery Device Silicone Nasal Cannula   Smoking History   Have you ever smoked Yes   Have you smoked in the last 12 months No   Confirm Quit Date 02/22/80   COPD Risk Screening   Do you have a history of COPD? Yes   Do you have a Pulmonologist? No   OTHER   During the past 4 weeks, how much did you feel short of breath? 2   COPD Population Screener   Do you ever cough up any mucus or phlegm? 0   In the past 12 months, you do less than you used to because of your breathing problems 1   Have you smoked at least 100 cigarettes in your entire life? 2   How old are you? 2   COPD Screening Score 7

## 2020-02-23 NOTE — FLOWSHEET NOTE
02/22/20 2024   Events/Summary/Plan   Events/Summary/Plan SVN    Vital Signs   Pulse 94   Respiration 18   Pulse Oximetry 93 %   $ Pulse Oximetry (Spot Check) Yes   Breath Sounds   RUL Breath Sounds Diminished   RML Breath Sounds Diminished   RLL Breath Sounds Diminished   CARROLL Breath Sounds Diminished   LLL Breath Sounds Diminished   Oxygen   O2 (LPM) 3   O2 Delivery Device Silicone Nasal Cannula

## 2020-02-23 NOTE — PROGRESS NOTES
Telemetry Shift Summary    Rhythm SR  HR Range 70s-90s  Ectopy fPVC, rCoup, fPAC, rTrig  Measurements 0.16/0.10/0.38        Normal Values  Rhythm SR  HR Range    Measurements 0.12-0.20 / 0.06-0.10  / 0.30-0.52

## 2020-02-23 NOTE — ASSESSMENT & PLAN NOTE
Due to exacerbation of COPD, he has very poor air movement  continue IV steroids but decrease frequency to q12h, expectorants, RT  Will order hospice consultation, patient is noncompliant with oxygen at home and medications  He is also a poor historian

## 2020-02-23 NOTE — PROGRESS NOTES
Passed meds to patient, assess patient for pain. Patient c/o of mid/upper abd pain when moving, offered medication patient declines. Call light within reach, bed is low, locked and alarm is on.

## 2020-02-23 NOTE — CARE PLAN
Problem: Infection  Goal: Will remain free from infection  Outcome: PROGRESSING AS EXPECTED  Note: Assess and monitor for signs and symptoms of infection. Perform hand hygiene before and after patient contact and entering/exiting the room.     Problem: Knowledge Deficit  Goal: Knowledge of disease process/condition, treatment plan, diagnostic tests, and medications will improve  Outcome: PROGRESSING AS EXPECTED  Note: Encourage patient to ask questions and be involved in plan of care. Provide education on treatment plan, diagnostic testing, and medications; have patient verbalize understanding.

## 2020-02-23 NOTE — FLOWSHEET NOTE
02/23/20 0638   Events/Summary/Plan   Events/Summary/Plan SVN   Skin Inspection Respiratory Device Intact   Vital Signs   Pulse 86   Respiration 20   Pulse Oximetry 99 %   $ Pulse Oximetry (Spot Check) Yes   Respiratory Assessment   Level of Consciousness Alert   Chest Exam   Chest Observation Barrel Chest   Breath Sounds   RUL Breath Sounds Clear   RML Breath Sounds Crackles;Diminished   RLL Breath Sounds Diminished;Crackles   CARROLL Breath Sounds Clear   LLL Breath Sounds Crackles;Diminished   Oxygen   O2 (LPM) 3   O2 Delivery Device Silicone Nasal Cannula

## 2020-02-23 NOTE — FLOWSHEET NOTE
02/23/20 1412   Events/Summary/Plan   Events/Summary/Plan SVN given   Vital Signs   Pulse 94   Respiration 18   Pulse Oximetry 95 %   Chest Exam   Chest Observation Barrel Chest   Breath Sounds   RUL Breath Sounds Clear   RML Breath Sounds Clear   RLL Breath Sounds Diminished;Crackles   CARROLL Breath Sounds Clear   LLL Breath Sounds Diminished   Oxygen   O2 (LPM) 3   O2 Delivery Device Silicone Nasal Cannula

## 2020-02-23 NOTE — FLOWSHEET NOTE
02/22/20 1635   Inhalation Therapy Treatment   $ SVN Performed Yes   Given By: Mouthpiece   Date SVN Next Change Due 02/29/20

## 2020-02-23 NOTE — CARE PLAN
Problem: Knowledge Deficit  Goal: Knowledge of disease process/condition, treatment plan, diagnostic tests, and medications will improve  Outcome: PROGRESSING SLOWER THAN EXPECTED  Provided education on the use of O2 at home and how to monitor O2 levels, patient states he uses O2 only when he feels like he needs it.   Problem: Respiratory:  Goal: Respiratory status will improve  Outcome: PROGRESSING AS EXPECTED   Patient on 3 L NC and saturations at 94%. Encourage pt to to take deep breaths and cough

## 2020-02-23 NOTE — PROGRESS NOTES
Received report from NANO Funes. Patient is awake, Pt resting comfortably in bed, plan of care discussed with patient, declines any needs at this time and denies pain. Call light within reach and safety precautions in place.

## 2020-02-24 LAB
ANION GAP SERPL CALC-SCNC: 12 MMOL/L (ref 7–16)
BUN SERPL-MCNC: 29 MG/DL (ref 8–22)
CALCIUM SERPL-MCNC: 8.6 MG/DL (ref 8.4–10.2)
CHLORIDE SERPL-SCNC: 94 MMOL/L (ref 96–112)
CO2 SERPL-SCNC: 30 MMOL/L (ref 20–33)
CREAT SERPL-MCNC: 1.16 MG/DL (ref 0.5–1.4)
GLUCOSE SERPL-MCNC: 171 MG/DL (ref 65–99)
NT-PROBNP SERPL IA-MCNC: ABNORMAL PG/ML (ref 0–125)
POTASSIUM SERPL-SCNC: 3.7 MMOL/L (ref 3.6–5.5)
SODIUM SERPL-SCNC: 136 MMOL/L (ref 135–145)

## 2020-02-24 PROCEDURE — A9270 NON-COVERED ITEM OR SERVICE: HCPCS | Performed by: INTERNAL MEDICINE

## 2020-02-24 PROCEDURE — 83880 ASSAY OF NATRIURETIC PEPTIDE: CPT

## 2020-02-24 PROCEDURE — 700101 HCHG RX REV CODE 250: Performed by: INTERNAL MEDICINE

## 2020-02-24 PROCEDURE — 700102 HCHG RX REV CODE 250 W/ 637 OVERRIDE(OP): Performed by: INTERNAL MEDICINE

## 2020-02-24 PROCEDURE — 770020 HCHG ROOM/CARE - TELE (206)

## 2020-02-24 PROCEDURE — 99232 SBSQ HOSP IP/OBS MODERATE 35: CPT | Performed by: INTERNAL MEDICINE

## 2020-02-24 PROCEDURE — 94760 N-INVAS EAR/PLS OXIMETRY 1: CPT

## 2020-02-24 PROCEDURE — 94640 AIRWAY INHALATION TREATMENT: CPT

## 2020-02-24 PROCEDURE — 97165 OT EVAL LOW COMPLEX 30 MIN: CPT

## 2020-02-24 PROCEDURE — 80048 BASIC METABOLIC PNL TOTAL CA: CPT

## 2020-02-24 PROCEDURE — 700111 HCHG RX REV CODE 636 W/ 250 OVERRIDE (IP): Performed by: INTERNAL MEDICINE

## 2020-02-24 RX ORDER — POLYMYXIN B SULFATE AND TRIMETHOPRIM 1; 10000 MG/ML; [USP'U]/ML
1 SOLUTION OPHTHALMIC
Status: ACTIVE | OUTPATIENT
Start: 2020-02-24 | End: 2020-02-25

## 2020-02-24 RX ADMIN — IPRATROPIUM BROMIDE AND ALBUTEROL SULFATE 3 ML: .5; 3 SOLUTION RESPIRATORY (INHALATION) at 11:13

## 2020-02-24 RX ADMIN — METHYLPREDNISOLONE SODIUM SUCCINATE 125 MG: 125 INJECTION, POWDER, FOR SOLUTION INTRAMUSCULAR; INTRAVENOUS at 12:04

## 2020-02-24 RX ADMIN — SENNOSIDES AND DOCUSATE SODIUM 2 TABLET: 8.6; 5 TABLET ORAL at 17:53

## 2020-02-24 RX ADMIN — METHYLPREDNISOLONE SODIUM SUCCINATE 125 MG: 125 INJECTION, POWDER, FOR SOLUTION INTRAMUSCULAR; INTRAVENOUS at 05:36

## 2020-02-24 RX ADMIN — GUAIFENESIN 1200 MG: 600 TABLET, EXTENDED RELEASE ORAL at 05:36

## 2020-02-24 RX ADMIN — FUROSEMIDE 40 MG: 10 INJECTION, SOLUTION INTRAVENOUS at 05:36

## 2020-02-24 RX ADMIN — POLYMYXIN B SULFATE AND TRIMETHOPRIM 1 DROP: 1; 10000 SOLUTION OPHTHALMIC at 17:54

## 2020-02-24 RX ADMIN — GUAIFENESIN 1200 MG: 600 TABLET, EXTENDED RELEASE ORAL at 17:53

## 2020-02-24 RX ADMIN — POLYMYXIN B SULFATE AND TRIMETHOPRIM SULFATE 1 DROP: 10000; 1 SOLUTION/ DROPS OPHTHALMIC at 05:35

## 2020-02-24 RX ADMIN — IPRATROPIUM BROMIDE AND ALBUTEROL SULFATE 3 ML: .5; 3 SOLUTION RESPIRATORY (INHALATION) at 06:24

## 2020-02-24 RX ADMIN — POLYMYXIN B SULFATE AND TRIMETHOPRIM 1 DROP: 1; 10000 SOLUTION OPHTHALMIC at 10:38

## 2020-02-24 RX ADMIN — METHYLPREDNISOLONE SODIUM SUCCINATE 125 MG: 125 INJECTION, POWDER, FOR SOLUTION INTRAMUSCULAR; INTRAVENOUS at 23:23

## 2020-02-24 RX ADMIN — POLYMYXIN B SULFATE AND TRIMETHOPRIM 1 DROP: 1; 10000 SOLUTION OPHTHALMIC at 15:36

## 2020-02-24 RX ADMIN — FUROSEMIDE 40 MG: 10 INJECTION, SOLUTION INTRAVENOUS at 16:26

## 2020-02-24 RX ADMIN — METHYLPREDNISOLONE SODIUM SUCCINATE 125 MG: 125 INJECTION, POWDER, FOR SOLUTION INTRAMUSCULAR; INTRAVENOUS at 17:54

## 2020-02-24 ASSESSMENT — ENCOUNTER SYMPTOMS
DIARRHEA: 0
WEAKNESS: 0
SHORTNESS OF BREATH: 1
NAUSEA: 0
SENSORY CHANGE: 0
FEVER: 0
WHEEZING: 0
INSOMNIA: 0
ORTHOPNEA: 0
DEPRESSION: 0
COUGH: 1
DIZZINESS: 0
SPUTUM PRODUCTION: 0
BACK PAIN: 0
NERVOUS/ANXIOUS: 0
STRIDOR: 0
TREMORS: 0
VOMITING: 0
MYALGIAS: 0

## 2020-02-24 ASSESSMENT — COGNITIVE AND FUNCTIONAL STATUS - GENERAL
HELP NEEDED FOR BATHING: A LOT
DRESSING REGULAR UPPER BODY CLOTHING: A LITTLE
PERSONAL GROOMING: A LITTLE
DAILY ACTIVITIY SCORE: 16
TOILETING: A LOT
SUGGESTED CMS G CODE MODIFIER DAILY ACTIVITY: CK
DRESSING REGULAR LOWER BODY CLOTHING: A LOT

## 2020-02-24 ASSESSMENT — ACTIVITIES OF DAILY LIVING (ADL): TOILETING: REQUIRES ASSIST

## 2020-02-24 NOTE — CARE PLAN
Problem: Safety  Goal: Will remain free from injury  Outcome: PROGRESSING AS EXPECTED  Note: Pt has bed alarm in place, pt currently 2 person assist. Pt educated to call if needing help with ambulation.      Problem: Skin Integrity  Goal: Risk for impaired skin integrity will decrease  Outcome: PROGRESSING AS EXPECTED  Note: Pt at risk for skin breakdown. Pt encouraged to turn. Pts skin assessed. Mepilex in place on sacrum.

## 2020-02-24 NOTE — FLOWSHEET NOTE
02/24/20 1115   Events/Summary/Plan   Events/Summary/Plan tx given   Skin Inspection Respiratory Device Intact   Vital Signs   Pulse 82   Respiration 16   Pulse Oximetry 96 %   $ Pulse Oximetry (Spot Check) Yes   Respiratory Assessment   Level of Consciousness Alert   Breath Sounds   RUL Breath Sounds Clear   RML Breath Sounds Diminished   RLL Breath Sounds Diminished   CARROLL Breath Sounds Clear   LLL Breath Sounds Diminished   Secretions   Cough Congested;Non Productive   Oxygen   O2 (LPM) 3   O2 Delivery Device Silicone Nasal Cannula

## 2020-02-24 NOTE — PROGRESS NOTES
Telemetry Shift Summary    Rhythm:  SR-ST  HR Range:   Ectopy: o-f PVC, r Couplets, o-f PAC  Measurements: .16/.08/.36          Normal Values  Rhythm SR  HR Range   Measurements 0.12 / 0.20 / 0.06-0.10 / 0.30-0.52

## 2020-02-24 NOTE — PROGRESS NOTES
Hospital Medicine Daily Progress Note    Date of Service  2/24/2020    Chief Complaint  89 y.o. male admitted 2/22/2020 with chest tightness    Hospital Course    This is a 89 y.o. male who presented 2/22/2020 with worsening shortness of breath.  He has a history of COPD, diastolic heart failure, was recently treated for pneumonia.  There was talk during his last hospitalization of going home on hospice but decision by haleigh was to take him home with home health.  Patient has been at home he states he is alone much of the time and feels too weak to get around or get anything to eat.  He is become progressively more short of breath and has chest tightness.  He is also not consistent with wearing his O2 at home and only wears it intermittently throughout the day.        Interval Problem Update  The patient has improved chest tightness today, he states he thought it was due to his pants being too tight but it persisted after his pants were removed  He requires assistance cleaning himself    Consultants/Specialty  none    Code Status  DNR    Disposition  snf vs hospice at snf    Review of Systems  Review of Systems   Constitutional: Positive for malaise/fatigue. Negative for fever.   HENT: Negative for congestion.    Respiratory: Positive for cough and shortness of breath. Negative for sputum production, wheezing and stridor.    Cardiovascular: Negative for chest pain, orthopnea and leg swelling.   Gastrointestinal: Negative for diarrhea, nausea and vomiting.   Genitourinary: Negative for dysuria and frequency.   Musculoskeletal: Negative for back pain and myalgias.   Skin: Negative for rash.   Neurological: Negative for dizziness, tremors, sensory change and weakness.   Psychiatric/Behavioral: Negative for depression. The patient is not nervous/anxious and does not have insomnia.         Physical Exam  Temp:  [36.3 °C (97.3 °F)-36.7 °C (98.1 °F)] 36.5 °C (97.7 °F)  Pulse:  [83-97] 93  Resp:  [16-18] 18  BP:  (109-118)/(57-68) 111/67  SpO2:  [94 %-98 %] 95 %    Physical Exam  Vitals signs and nursing note reviewed.   Constitutional:       Appearance: He is ill-appearing. He is not diaphoretic.   HENT:      Nose: No congestion.      Mouth/Throat:      Mouth: Mucous membranes are moist.      Pharynx: No oropharyngeal exudate or posterior oropharyngeal erythema.   Eyes:      General: No scleral icterus.     Extraocular Movements: Extraocular movements intact.   Neck:      Musculoskeletal: Neck supple. No muscular tenderness.   Cardiovascular:      Rate and Rhythm: Normal rate and regular rhythm.      Pulses: Normal pulses.      Heart sounds: Heart sounds are distant. No murmur. No friction rub.   Pulmonary:      Effort: No respiratory distress.      Breath sounds: Decreased air movement present. Decreased breath sounds, wheezing and rhonchi present.   Abdominal:      General: Bowel sounds are normal.      Tenderness: There is no abdominal tenderness. There is no guarding.   Musculoskeletal:         General: No swelling or signs of injury.   Skin:     General: Skin is warm and dry.      Capillary Refill: Capillary refill takes less than 2 seconds.      Coloration: Skin is not pale.      Findings: No bruising, erythema or rash.      Comments: Bruising over left back consistent with fall   Neurological:      Mental Status: He is alert and oriented to person, place, and time.      Motor: Weakness present.   Psychiatric:         Mood and Affect: Mood normal.         Thought Content: Thought content normal.         Cognition and Memory: Cognition is impaired. Memory is impaired.         Fluids    Intake/Output Summary (Last 24 hours) at 2/24/2020 0901  Last data filed at 2/24/2020 0845  Gross per 24 hour   Intake 720 ml   Output 1900 ml   Net -1180 ml       Laboratory  Recent Labs     02/22/20  1345   WBC 7.1   RBC 3.81*   HEMOGLOBIN 12.6*   HEMATOCRIT 39.4*   .4*   MCH 33.1*   MCHC 32.0*   RDW 52.8*   PLATELETCT 122*    MPV 9.6     Recent Labs     02/22/20  1345 02/23/20  0302 02/24/20  0405   SODIUM 135 137 136   POTASSIUM 4.8 4.1 3.7   CHLORIDE 97 97 94*   CO2 25 29 30   GLUCOSE 105* 164* 171*   BUN 19 19 29*   CREATININE 0.82 0.94 1.16   CALCIUM 9.1 8.7 8.6     Recent Labs     02/22/20  1345   APTT 25.3   INR 1.04               Imaging  DX-CHEST-PORTABLE (1 VIEW)   Final Result      Cardiomegaly with interstitial edema.           Assessment/Plan  Chronic obstructive pulmonary disease with acute exacerbation (HCC)- (present on admission)  Assessment & Plan  Steroids, oxygen, RT    DNR (do not resuscitate)  Assessment & Plan  Hospice vs snf depending upon the patient's improvement  Will order PT/OT    Chronic diastolic (congestive) heart failure (HCC)  Assessment & Plan  With exacerbation  Continue iv lasix, after discussion with his son Moi, the patient had been off his lasix at home as his blood pressure was low    Acute on chronic respiratory failure with hypoxemia (HCC)- (present on admission)  Assessment & Plan  Due to exacerbation of COPD, he has very poor air movement  continue IV steroids, expectorants, RT  Will order hospice consultation, patient is noncompliant with oxygen at home and medications  He is also a poor historian       VTE prophylaxis: heparin

## 2020-02-24 NOTE — PROGRESS NOTES
Received report from Sophia MCGILL. Pt resting in bed at this time. Pt refusing Neb treatment. Pt educated about midnight medications, pt stating understanding.

## 2020-02-24 NOTE — PROGRESS NOTES
Hold all Home Health services. Patient admitted to [facility] eff 2/22 with chest pain, shortness of breath

## 2020-02-24 NOTE — PROGRESS NOTES
Received report from NANO Perez. Patient is awake, Pt resting comfortably in bed, plan of care discussed with patient, declines any needs at this time and denies pain. Call light within reach and safety precautions in place.

## 2020-02-24 NOTE — PROGRESS NOTES
Telemetry Shift Summary    Rhythm SR/ST  HR Range   Ectopy Occasional PVC, PAC. Rare Coup  Measurements 0.16/0.10/0.32        Normal Values  Rhythm SR  HR Range    Measurements 0.12-0.20 / 0.06-0.10  / 0.30-0.52

## 2020-02-24 NOTE — THERAPY
"Occupational Therapy Evaluation completed.   Functional Status:  Pt presents in bed, A&Ox3. [place]. O2@2L NC. Remembers this OT from pt's visit ~2 wks ago. Agreeable for activity. Performs sup to sit with Wes, extra effort. Generalized weakness. STS with Wes,FWW. Transfer to chair with Wes,FWW. Fatigues quickly. Seated grooming with Sup. Tolerates UIC post session.     Plan of Care: Will benefit from Occupational Therapy 2 times per week  Discharge Recommendations:  Equipment: No Equipment Needed. Post-acute therapy Discharge to a transitional care facility for continued skilled therapy services. Lives with son.  See \"Rehab Therapy-Acute\" Patient Summary Report for complete documentation.    "

## 2020-02-24 NOTE — CARE PLAN
Problem: Skin Integrity  Goal: Risk for impaired skin integrity will decrease  Outcome: PROGRESSING AS EXPECTED   Check on patient hourly to verify patient is dry, verify condom cath is in place, changed all linens and patient gown, mepilex  in place and barrier cream used  Problem: Mobility  Goal: Risk for activity intolerance will decrease  Outcome: PROGRESSING AS EXPECTED   Encourage patient to sit up in chair for meals and to ambulate to restroom

## 2020-02-25 PROBLEM — R04.0 EPISTAXIS: Status: ACTIVE | Noted: 2020-02-25

## 2020-02-25 LAB
ANION GAP SERPL CALC-SCNC: 16 MMOL/L (ref 7–16)
BUN SERPL-MCNC: 36 MG/DL (ref 8–22)
CALCIUM SERPL-MCNC: 8.8 MG/DL (ref 8.4–10.2)
CHLORIDE SERPL-SCNC: 95 MMOL/L (ref 96–112)
CO2 SERPL-SCNC: 29 MMOL/L (ref 20–33)
CREAT SERPL-MCNC: 1.15 MG/DL (ref 0.5–1.4)
GLUCOSE SERPL-MCNC: 166 MG/DL (ref 65–99)
POTASSIUM SERPL-SCNC: 3.7 MMOL/L (ref 3.6–5.5)
SODIUM SERPL-SCNC: 140 MMOL/L (ref 135–145)

## 2020-02-25 PROCEDURE — 94760 N-INVAS EAR/PLS OXIMETRY 1: CPT

## 2020-02-25 PROCEDURE — 770020 HCHG ROOM/CARE - TELE (206)

## 2020-02-25 PROCEDURE — 99233 SBSQ HOSP IP/OBS HIGH 50: CPT | Performed by: INTERNAL MEDICINE

## 2020-02-25 PROCEDURE — 700111 HCHG RX REV CODE 636 W/ 250 OVERRIDE (IP): Performed by: INTERNAL MEDICINE

## 2020-02-25 PROCEDURE — 80048 BASIC METABOLIC PNL TOTAL CA: CPT

## 2020-02-25 PROCEDURE — 97161 PT EVAL LOW COMPLEX 20 MIN: CPT

## 2020-02-25 PROCEDURE — A9270 NON-COVERED ITEM OR SERVICE: HCPCS | Performed by: INTERNAL MEDICINE

## 2020-02-25 PROCEDURE — 700102 HCHG RX REV CODE 250 W/ 637 OVERRIDE(OP): Performed by: INTERNAL MEDICINE

## 2020-02-25 RX ORDER — IPRATROPIUM BROMIDE AND ALBUTEROL SULFATE 2.5; .5 MG/3ML; MG/3ML
3 SOLUTION RESPIRATORY (INHALATION)
Status: DISCONTINUED | OUTPATIENT
Start: 2020-02-25 | End: 2020-02-26 | Stop reason: HOSPADM

## 2020-02-25 RX ORDER — OXYMETAZOLINE HYDROCHLORIDE 0.05 G/100ML
2 SPRAY NASAL EVERY 4 HOURS PRN
Status: DISCONTINUED | OUTPATIENT
Start: 2020-02-25 | End: 2020-02-26 | Stop reason: HOSPADM

## 2020-02-25 RX ADMIN — METHYLPREDNISOLONE SODIUM SUCCINATE 125 MG: 125 INJECTION, POWDER, FOR SOLUTION INTRAMUSCULAR; INTRAVENOUS at 12:43

## 2020-02-25 RX ADMIN — METHYLPREDNISOLONE SODIUM SUCCINATE 125 MG: 125 INJECTION, POWDER, FOR SOLUTION INTRAMUSCULAR; INTRAVENOUS at 23:13

## 2020-02-25 RX ADMIN — POLYMYXIN B SULFATE AND TRIMETHOPRIM 1 DROP: 1; 10000 SOLUTION OPHTHALMIC at 14:59

## 2020-02-25 RX ADMIN — METHYLPREDNISOLONE SODIUM SUCCINATE 125 MG: 125 INJECTION, POWDER, FOR SOLUTION INTRAMUSCULAR; INTRAVENOUS at 05:57

## 2020-02-25 RX ADMIN — METHYLPREDNISOLONE SODIUM SUCCINATE 125 MG: 125 INJECTION, POWDER, FOR SOLUTION INTRAMUSCULAR; INTRAVENOUS at 17:58

## 2020-02-25 RX ADMIN — GUAIFENESIN 1200 MG: 600 TABLET, EXTENDED RELEASE ORAL at 17:58

## 2020-02-25 RX ADMIN — POLYMYXIN B SULFATE AND TRIMETHOPRIM 1 DROP: 1; 10000 SOLUTION OPHTHALMIC at 05:56

## 2020-02-25 RX ADMIN — POLYMYXIN B SULFATE AND TRIMETHOPRIM 1 DROP: 1; 10000 SOLUTION OPHTHALMIC at 10:35

## 2020-02-25 RX ADMIN — GUAIFENESIN 1200 MG: 600 TABLET, EXTENDED RELEASE ORAL at 05:59

## 2020-02-25 ASSESSMENT — ENCOUNTER SYMPTOMS
PHOTOPHOBIA: 0
DIARRHEA: 0
SHORTNESS OF BREATH: 1
NAUSEA: 0
CLAUDICATION: 0
HEARTBURN: 0
CONSTIPATION: 0
COUGH: 0
FEVER: 0
DIZZINESS: 0
VOMITING: 0
HEADACHES: 0
INSOMNIA: 0
SPEECH CHANGE: 0
DEPRESSION: 0
ABDOMINAL PAIN: 0
SENSORY CHANGE: 0
WEAKNESS: 0
MYALGIAS: 0
BLURRED VISION: 0
CHILLS: 0
NERVOUS/ANXIOUS: 0

## 2020-02-25 ASSESSMENT — COGNITIVE AND FUNCTIONAL STATUS - GENERAL
TURNING FROM BACK TO SIDE WHILE IN FLAT BAD: A LITTLE
CLIMB 3 TO 5 STEPS WITH RAILING: A LOT
MOVING FROM LYING ON BACK TO SITTING ON SIDE OF FLAT BED: A LITTLE
STANDING UP FROM CHAIR USING ARMS: A LITTLE
WALKING IN HOSPITAL ROOM: A LITTLE
MOBILITY SCORE: 16
MOVING TO AND FROM BED TO CHAIR: A LOT
SUGGESTED CMS G CODE MODIFIER MOBILITY: CK

## 2020-02-25 ASSESSMENT — GAIT ASSESSMENTS
GAIT LEVEL OF ASSIST: MINIMAL ASSIST
DISTANCE (FEET): 12
DEVIATION: SHUFFLED GAIT;BRADYKINETIC
ASSISTIVE DEVICE: FRONT WHEEL WALKER

## 2020-02-25 NOTE — PROGRESS NOTES
Received report from Sophia MCGILL. Pt resting in bed at this time. Condom cath in place, pt offered bed bath, pt refused. Pt has bed alarm on.

## 2020-02-25 NOTE — CARE PLAN
Problem: Safety  Goal: Will remain free from injury  2/25/2020 1330 by Sophia Gonzalez R.N.  Outcome: PROGRESSING AS EXPECTED  Patient calling before getting out of bed or out of chair, chair alarm and bed alarm are in use. Personal belongs are with in reach. Tread socks on, bed is locked and low.   Problem: Mobility  Goal: Risk for activity intolerance will decrease  2/25/2020 1330 by Sophia Gonzalez R.N.  Outcome: PROGRESSING AS EXPECTED    Encouraged patient to sit in chair for meals or at edge of bed and to ambulate several times during day

## 2020-02-25 NOTE — PROGRESS NOTES
Per hosptatlist this patient is transitioning to comfort measure.  Met with spouse Kavin, son Chente and his wife Fabi.   Provided emotional support.  DTR coming from [place].     Bereavement tray ordered.    Telemetry Shift Summary    Rhythm SR/ST  HR Range   Ectopy r-oCoup, oPAC, trig, r-fPVC  Measurements 0.14/0.06/0.34    Per Perri, MT    Normal Values  Rhythm SR  HR Range    Measurements 0.12-0.20 / 0.06-0.10  / 0.30-0.52

## 2020-02-25 NOTE — RESPIRATORY CARE
Respiratory Therapy Update    Interdisciplinary Plan of Care-Goals (Indications)  Bronchodilator Indications: History / Diagnosis;Strong Subjective / Objective Improvement (02/22/20 1600)              Cough: Congested;Non Productive                      O2 (LPM): 3 (02/24/20 1505)       Breath Sounds  RUL Breath Sounds: Clear (02/24/20 1115)  RML Breath Sounds: Diminished (02/24/20 1115)  RLL Breath Sounds: Diminished (02/24/20 1115)  CARROLL Breath Sounds: Clear (02/24/20 1115)  LLL Breath Sounds: Diminished (02/24/20 1115)    Events/Summary/Plan: no changes to pt at this time. Cont tx's as needed assess in AM

## 2020-02-25 NOTE — DISCHARGE PLANNING
Received Choice form at 6121  Agency/Facility Name: [facility]  Referral sent per Choice form @ [phone]

## 2020-02-25 NOTE — DISCHARGE PLANNING
Care Transition Team Assessment  Student  met with patient at bedside. Pt would like to be discharged home with support from both son's Moi and Felipa. Prior to being in the hospital Pt had no difficulties with ADLs. Pt reports getting help with cooking and cleaning. Pt. reports using a walker occasionally. Pt. Has no concerns of any financial barriers when D/C from the hospital.     Reviewed by MELYSSA Allen     Information Source  Orientation : Oriented x 4  Information Given By: Patient  Informant's Name: (Jhon DEYSI Begum)  Who is responsible for making decisions for patient? : Patient         Elopement Risk  Legal Hold: No  Ambulatory or Self Mobile in Wheelchair: No-Not an Elopement Risk  Elopement Risk: Not at Risk for Elopement    Interdisciplinary Discharge Planning  Primary Care Physician: (Ihsan Monet M.D.)  Lives with - Patient's Self Care Capacity: Adult Children  Patient or legal guardian wants to designate a caregiver (see row info): No  Support Systems: Children  Housing / Facility: Mobile Home  Prior Services: Intermittent Physical Support for ADL Per Family  Durable Medical Equipment: Walker    Discharge Preparedness  What is your plan after discharge?: Home with help  What are your discharge supports?: Child  Prior Functional Level: Ambulatory, Needs Assist with Activities of Daily Living, Uses Walker  Difficulity with ADLs: None  Difficulity with IADLs: Cooking, Driving, Laundry, Shopping    Functional Assesment  Prior Functional Level: Ambulatory, Needs Assist with Activities of Daily Living, Uses Walker    Finances  Financial Barriers to Discharge: No    Vision / Hearing Impairment  Vision Impairment : Yes  Right Eye Vision: Impaired  Left Eye Vision: Impaired  Hearing Impairment : Yes  Hearing Impairment: Both Ears  Does Pt Need Special Equipment for the Hearing Impaired?: No              Domestic Abuse  Have you ever been the victim of abuse or violence?: No  Physical  Abuse or Sexual Abuse: No  Verbal Abuse or Emotional Abuse: No  Possible Abuse Reported to:: Not Applicable         Discharge Risks or Barriers  Discharge risks or barriers?: No    Anticipated Discharge Information  Anticipated discharge disposition: Home  Discharge Address: ([facility] [address] [place] [address])  Discharge Contact Phone Number: [phone])

## 2020-02-25 NOTE — DISCHARGE PLANNING
KATEYW sent [email or web address] services Home Health request to [facility].

## 2020-02-25 NOTE — CARE PLAN
Problem: Knowledge Deficit  Goal: Knowledge of disease process/condition, treatment plan, diagnostic tests, and medications will improve  Outcome: PROGRESSING AS EXPECTED  Note: Pt Aox4, with short term forgetfulness. Pt educated about medication, pt able to provide teach back.      Problem: Skin Integrity  Goal: Risk for impaired skin integrity will decrease  Outcome: PROGRESSING AS EXPECTED  Note: Pt at risk for skin breakdown, pt has condom cath in place. Pt educated about use of cath.

## 2020-02-25 NOTE — PROGRESS NOTES
PT got patient up in cardiac chair, patient denies pain and feels states he feels comfortable at this time. Will continue to monitor. Call light is in reach, chair alarm is on, tread socks on

## 2020-02-25 NOTE — DIETARY
"Nutrition services: Day 2 of admit.  Jhon Begum is a 89 y.o. male with admitting DX of diastolic heart failure.    Consult received for low BMI      Assessment:  Height: 175.3 cm (5' 9\")  Weight: 49 kg (108 lb 0.4 oz)  Body mass index is 15.95 kg/m²., BMI classification: underweight  Diet/Intake: regular, level 6-soft & bite sized and level 0-thin liquids/PO intake good today at % x 1 and 50-75% x 1    Evaluation:   1. Per H&P, poor PO reported PTA due to limited access to food while at home. Documentation from prior admit reviewed. Pt weighed 53 kg on 1/28/20. Weight loss noted of 4 kg (7.5%) x 1 month. Weight loss is significant. During prior admit, pt drank Boost Plus supplements. This will be provided TID. Current PO intake has also been adequate. It appears that intake improved with change in diet to soft & bite sized (dysphagia 3).   2. Meds: solu-medrol    Malnutrition Risk: Unknown at this time    Recommendations/Plan:  1. Add Boost Plus to meals TID.   2. Encourage intake of >50%  3. Document intake of all meals and supplements  as % taken in ADL's to provide interdisciplinary communication across all shifts.   4. Monitor weight.  5. Nutrition rep will continue to see patient for ongoing meal and snack preferences.  6. RD will monitor.           "

## 2020-02-25 NOTE — PROGRESS NOTES
Received report from NANO Perez. Patient is sleeping, Pt resting comfortably in bed, Call light within reach and safety precautions in place.     09:00 Helped patient with setting up breakfast tray, assess patient, plan of care discussed with patient, declines any needs at this time and denies pain. Call light and belongs in reach, bed is low locked and alarm is on.

## 2020-02-25 NOTE — THERAPY
"Physical Therapy Evaluation completed.   Bed Mobility:  Supine to Sit: Moderate Assist  Transfers: Sit to Stand: Minimal Assist  Gait: Level Of Assist: Minimal Assist with Front-Wheel Walker x12 ft     Plan of Care: Will benefit from Physical Therapy 3 times per week  Discharge Recommendations: Equipment: Will Continue to Assess for Equipment Needs. Recommend post-acute placement for continued physical therapy services prior to discharge home.       Pt is a 89 yo male with diagnosis of heart failure and COPD exacerbation. Pt is presenting with weakness and impaired balance and gait as well as impaired endurance. Pt would benefit from further therapy at SNF prior to DC home to address mentioned impairments. Recommend continued acute PT to improve mobility and strength.    See \"Rehab Therapy-Acute\" Patient Summary Report for complete documentation.     "

## 2020-02-25 NOTE — CARE PLAN
Problem: Nutritional:  Goal: Achieve adequate nutritional intake  Description: Patient will consume >50% of meals and supplements.   Outcome: PROGRESSING AS EXPECTED

## 2020-02-25 NOTE — PROGRESS NOTES
[facility] Medicine Daily Progress Note    Date of Service  2/25/2020    Chief Complaint  89 y.o. male admitted 2/22/2020 with shortness of breath.    Hospital Course    Patient with history of COPD and heart failure with recent treatment of pneumonia.  Patient discharged to rehab and discharged from there home, patient weak and unable to get around much and with progressive SOB.  He has home oxygen and is minimally complaint with wearing it.        Interval Problem Update  Patient feeling continued shortness of breath.  He had his oxygen tubing on his forehead and states he feels congested.      Consultants/Specialty  none    Code Status  DNR/DNI    Disposition  SNF likely.    Review of Systems  Review of Systems   Constitutional: Positive for malaise/fatigue. Negative for chills and fever.   HENT: Positive for congestion (with nose bleeding) and nosebleeds.    Eyes: Negative for blurred vision and photophobia.   Respiratory: Positive for shortness of breath. Negative for cough.    Cardiovascular: Negative for chest pain, claudication and leg swelling.   Gastrointestinal: Negative for abdominal pain, constipation, diarrhea, heartburn, nausea and vomiting.   Genitourinary: Negative for dysuria and hematuria.   Musculoskeletal: Negative for joint pain and myalgias.   Skin: Negative for itching and rash.   Neurological: Negative for dizziness, sensory change, speech change, weakness and headaches.   Psychiatric/Behavioral: Negative for depression. The patient is not nervous/anxious and does not have insomnia.         Physical Exam  Temp:  [36.3 °C (97.4 °F)-36.6 °C (97.9 °F)] 36.3 °C (97.4 °F)  Pulse:  [58-97] 69  Resp:  [17-18] 17  BP: (111-145)/(63-86) 120/86  SpO2:  [90 %-100 %] 95 %    Physical Exam  Vitals signs and nursing note reviewed.   Constitutional:       General: He is not in acute distress.     Appearance: Normal appearance.   HENT:      Head: Normocephalic and atraumatic.      Nose:      Comments: Dried  blood right nare    Eyes:      General: No scleral icterus.     Extraocular Movements: Extraocular movements intact.   Neck:      Musculoskeletal: Normal range of motion and neck supple.   Cardiovascular:      Rate and Rhythm: Normal rate and regular rhythm.      Pulses: Normal pulses.      Heart sounds: Normal heart sounds. No murmur.   Pulmonary:      Effort: Pulmonary effort is normal. No respiratory distress.      Breath sounds: Normal breath sounds. No wheezing, rhonchi or rales.   Abdominal:      General: Abdomen is flat. Bowel sounds are normal. There is no distension.      Palpations: Abdomen is soft.      Tenderness: There is no rebound.   Musculoskeletal:         General: No swelling or tenderness.   Lymphadenopathy:      Cervical: No cervical adenopathy.   Skin:     Coloration: Skin is not jaundiced.      Findings: No erythema.   Neurological:      General: No focal deficit present.      Mental Status: He is alert and oriented to person, place, and time. Mental status is at baseline.      Cranial Nerves: No cranial nerve deficit.   Psychiatric:         Mood and Affect: Mood normal.         Behavior: Behavior normal.         Fluids    Intake/Output Summary (Last 24 hours) at 2/25/2020 1211  Last data filed at 2/25/2020 0400  Gross per 24 hour   Intake 480 ml   Output 1000 ml   Net -520 ml       Laboratory  Recent Labs     02/22/20  1345   WBC 7.1   RBC 3.81*   HEMOGLOBIN 12.6*   HEMATOCRIT 39.4*   .4*   MCH 33.1*   MCHC 32.0*   RDW 52.8*   PLATELETCT 122*   MPV 9.6     Recent Labs     02/23/20  0302 02/24/20  0405 02/25/20  0404   SODIUM 137 136 140   POTASSIUM 4.1 3.7 3.7   CHLORIDE 97 94* 95*   CO2 29 30 29   GLUCOSE 164* 171* 166*   BUN 19 29* 36*   CREATININE 0.94 1.16 1.15   CALCIUM 8.7 8.6 8.8     Recent Labs     02/22/20  1345   APTT 25.3   INR 1.04               Imaging  DX-CHEST-PORTABLE (1 VIEW)   Final Result      Cardiomegaly with interstitial edema.           Assessment/Plan  Chronic  obstructive pulmonary disease with acute exacerbation (HCC)- (present on admission)  Assessment & Plan  Steroids, oxygen, RT    Epistaxis  Assessment & Plan  Humidifier on oxygen nasal cannula  Afrin prn      DNR (do not resuscitate)  Assessment & Plan  Snf placement, patient doesn't want hospice.  Will order PT/OT    Chronic diastolic (congestive) heart failure (HCC)  Assessment & Plan  With exacerbation  Continue iv lasix, after discussion with his son Moi, the patient had been off his lasix at home as his blood pressure was low    Acute on chronic respiratory failure with hypoxemia (HCC)- (present on admission)  Assessment & Plan  Due to exacerbation of COPD, he has very poor air movement  continue IV steroids, expectorants, RT  Will order hospice consultation, patient is noncompliant with oxygen at home and medications  He is also a poor historian         VTE prophylaxis: SCDs

## 2020-02-25 NOTE — PROGRESS NOTES
Patient sitting up in chair, Pt. states pain is there when he moves but is fine if he is still. Will continue to monitor. Safety precautions in place.

## 2020-02-25 NOTE — DISCHARGE PLANNING
Received Choice form at 9748  Agency/Facility Name: [facility]  Referral sent per Choice form @ [phone]

## 2020-02-25 NOTE — PROGRESS NOTES
Telemetry Shift Summary    Rhythm SR  HR Range 78-98  Ectopy Frequent PVC, PAC, Coup. Rare Trip.  Measurements 0.22/0.18/0.48        Normal Values  Rhythm SR  HR Range    Measurements 0.12-0.20 / 0.06-0.10  / 0.30-0.52

## 2020-02-26 VITALS
WEIGHT: 108.03 LBS | TEMPERATURE: 97.9 F | DIASTOLIC BLOOD PRESSURE: 59 MMHG | HEIGHT: 69 IN | SYSTOLIC BLOOD PRESSURE: 110 MMHG | BODY MASS INDEX: 16 KG/M2 | OXYGEN SATURATION: 94 % | RESPIRATION RATE: 18 BRPM | HEART RATE: 90 BPM

## 2020-02-26 LAB
ANION GAP SERPL CALC-SCNC: 9 MMOL/L (ref 7–16)
BASOPHILS # BLD AUTO: 0.1 % (ref 0–1.8)
BASOPHILS # BLD: 0.01 K/UL (ref 0–0.12)
BUN SERPL-MCNC: 44 MG/DL (ref 8–22)
CALCIUM SERPL-MCNC: 8.8 MG/DL (ref 8.4–10.2)
CHLORIDE SERPL-SCNC: 94 MMOL/L (ref 96–112)
CO2 SERPL-SCNC: 30 MMOL/L (ref 20–33)
CREAT SERPL-MCNC: 1.02 MG/DL (ref 0.5–1.4)
EOSINOPHIL # BLD AUTO: 0 K/UL (ref 0–0.51)
EOSINOPHIL NFR BLD: 0 % (ref 0–6.9)
ERYTHROCYTE [DISTWIDTH] IN BLOOD BY AUTOMATED COUNT: 51.8 FL (ref 35.9–50)
GLUCOSE SERPL-MCNC: 162 MG/DL (ref 65–99)
HCT VFR BLD AUTO: 38.1 % (ref 42–52)
HGB BLD-MCNC: 12.2 G/DL (ref 14–18)
IMM GRANULOCYTES # BLD AUTO: 0.11 K/UL (ref 0–0.11)
IMM GRANULOCYTES NFR BLD AUTO: 1.1 % (ref 0–0.9)
LYMPHOCYTES # BLD AUTO: 0.33 K/UL (ref 1–4.8)
LYMPHOCYTES NFR BLD: 3.3 % (ref 22–41)
MCH RBC QN AUTO: 32.4 PG (ref 27–33)
MCHC RBC AUTO-ENTMCNC: 32 G/DL (ref 33.7–35.3)
MCV RBC AUTO: 101.3 FL (ref 81.4–97.8)
MONOCYTES # BLD AUTO: 0.59 K/UL (ref 0–0.85)
MONOCYTES NFR BLD AUTO: 5.8 % (ref 0–13.4)
NEUTROPHILS # BLD AUTO: 9.1 K/UL (ref 1.82–7.42)
NEUTROPHILS NFR BLD: 89.7 % (ref 44–72)
NRBC # BLD AUTO: 0 K/UL
NRBC BLD-RTO: 0 /100 WBC
PLATELET # BLD AUTO: 139 K/UL (ref 164–446)
PMV BLD AUTO: 9.8 FL (ref 9–12.9)
POTASSIUM SERPL-SCNC: 4.2 MMOL/L (ref 3.6–5.5)
RBC # BLD AUTO: 3.76 M/UL (ref 4.7–6.1)
SODIUM SERPL-SCNC: 133 MMOL/L (ref 135–145)
WBC # BLD AUTO: 10.1 K/UL (ref 4.8–10.8)

## 2020-02-26 PROCEDURE — 700111 HCHG RX REV CODE 636 W/ 250 OVERRIDE (IP): Performed by: INTERNAL MEDICINE

## 2020-02-26 PROCEDURE — 99239 HOSP IP/OBS DSCHRG MGMT >30: CPT | Performed by: INTERNAL MEDICINE

## 2020-02-26 PROCEDURE — 85025 COMPLETE CBC W/AUTO DIFF WBC: CPT

## 2020-02-26 PROCEDURE — 80048 BASIC METABOLIC PNL TOTAL CA: CPT

## 2020-02-26 PROCEDURE — 700102 HCHG RX REV CODE 250 W/ 637 OVERRIDE(OP): Performed by: INTERNAL MEDICINE

## 2020-02-26 PROCEDURE — A9270 NON-COVERED ITEM OR SERVICE: HCPCS | Performed by: INTERNAL MEDICINE

## 2020-02-26 RX ORDER — IPRATROPIUM BROMIDE AND ALBUTEROL SULFATE 2.5; .5 MG/3ML; MG/3ML
3 SOLUTION RESPIRATORY (INHALATION) EVERY 4 HOURS PRN
Start: 2020-02-26

## 2020-02-26 RX ORDER — PREDNISONE 20 MG/1
60 TABLET ORAL DAILY
Qty: 30 TAB | Refills: 0 | Status: SHIPPED | OUTPATIENT
Start: 2020-02-26

## 2020-02-26 RX ORDER — GUAIFENESIN 1200 MG/1
1200 TABLET, EXTENDED RELEASE ORAL EVERY 12 HOURS
Qty: 28 TAB
Start: 2020-02-26

## 2020-02-26 RX ORDER — METHYLPREDNISOLONE SODIUM SUCCINATE 125 MG/2ML
125 INJECTION, POWDER, LYOPHILIZED, FOR SOLUTION INTRAMUSCULAR; INTRAVENOUS EVERY 12 HOURS
Status: DISCONTINUED | OUTPATIENT
Start: 2020-02-26 | End: 2020-02-26 | Stop reason: HOSPADM

## 2020-02-26 RX ADMIN — GUAIFENESIN 1200 MG: 600 TABLET, EXTENDED RELEASE ORAL at 05:54

## 2020-02-26 RX ADMIN — METHYLPREDNISOLONE SODIUM SUCCINATE 125 MG: 125 INJECTION, POWDER, FOR SOLUTION INTRAMUSCULAR; INTRAVENOUS at 05:53

## 2020-02-26 ASSESSMENT — ENCOUNTER SYMPTOMS
MYALGIAS: 0
INSOMNIA: 0
CHILLS: 0
SHORTNESS OF BREATH: 1
COUGH: 0
BLURRED VISION: 0
ABDOMINAL PAIN: 0
WEAKNESS: 0
FEVER: 0
CLAUDICATION: 0
SPEECH CHANGE: 0
DEPRESSION: 0
DIARRHEA: 0
CONSTIPATION: 0
PHOTOPHOBIA: 0
SENSORY CHANGE: 0
NAUSEA: 0
DIZZINESS: 0
HEADACHES: 0
NERVOUS/ANXIOUS: 0
HEARTBURN: 0
VOMITING: 0

## 2020-02-26 NOTE — PROGRESS NOTES
Rounded on patient, patient sleeping in chair respirations are even and unlabored. Call light on lap chair alarm on

## 2020-02-26 NOTE — DIETARY
Nutrition Services: Update   Day 4 of admit.  Jhon Begum is a 89 y.o. male with admitting DX of Diastolic heart failure.    Pt is currently on regular, soft & bite sized (dysph 3) diet with thin liquids. He is also receiving Boost Plus TID with meals. Recorded PO intake prior to 2/25 has been good at 50-75% x 3 and % x 2. Per nurse, more recent PO intake has not been that good at his meals. He drinks his Boost supplements well though. Nurse thinks that pt is filling up on his Boost supplements and not eating his meals. Pt may eat his meals better if the Boost supplement is provided after pt finishes his meals. Nurse said that pt consumes liquids better than solids. She thought that the pt would drink a Chobani Smoothie at snack better than a Greek yogurt. Pt reports that he eats 2 1/2 meals at home with a regular size breakfast and dinner and a smaller lunch. He said that he is never hungry, though. He related diminished appetite to less activity. He said that his son prepares his meals for him. Nurse was present during RD visit to assist with discussion because pt is not the best historian.     No new weights since admit to review. As noted in previous RD note dated 2/24, significant weight loss noted of 4 kg (7.5%) x 1 month.    Malnutrition Risk: pt with malnutrition in the context of environmental circumstances related to limited access to foods and poor appetite AEB weight loss of 4 kg (7.5%) x 1 month and presence of moderate muscle loss in the temple region.     Recommendations/Plan:  1. Add Chobani Smoothies to snacks TID.  2. Provide Boost Plus after pt eats his meal to encourage PO of his meal first.    3. Encourage intake of meals  4. Document intake of all meals as % taken in ADL's to provide interdisciplinary communication across all shifts.   5. Monitor weight.  6. Nutrition rep will continue to see patient for ongoing meal and snack preferences.     RD following

## 2020-02-26 NOTE — PROGRESS NOTES
Patient in bed eating lunch, asked patient if he had any pain, patient denied any. Informed patient that he will be leaving at 15:00 to go to [facility] and Moi would be there to meet him. Bed left low, locked with alarm on. Call light in reach.

## 2020-02-26 NOTE — DISCHARGE PLANNING
Spoke to Jacinta at [facility]. [facility] has a bed available and can transport pt today at 1500.

## 2020-02-26 NOTE — PROGRESS NOTES
Telemetry Shift Summary    Rhythm SR  HR Range 76-94  Ectopy Rare PVC, Coup.  Measurements 0.12/0.06/0.34        Normal Values  Rhythm SR  HR Range    Measurements 0.12-0.20 / 0.06-0.10  / 0.30-0.52

## 2020-02-26 NOTE — DISCHARGE PLANNING
Please provide HH orders and F2F. Once  receives the required documentation we will then review  Your referral request.         Thank you

## 2020-02-26 NOTE — CARE PLAN
Problem: Safety  Goal: Will remain free from falls  Outcome: PROGRESSING AS EXPECTED  Note: Pt at risk for falls, pt educated to call prior to ambulating. Bed alarm on.     Problem: Respiratory:  Goal: Respiratory status will improve  Outcome: PROGRESSING AS EXPECTED  Note: Pt currently on oxygen, pt breathing steady during sleep. Pt educated to call staff if felling SOB.

## 2020-02-26 NOTE — DISCHARGE SUMMARY
Discharge Summary    CHIEF COMPLAINT ON ADMISSION  Chief Complaint   Patient presents with   • Shortness of Breath   • Abdominal Pain       Reason for Admission  EMS     Admission Date  2/22/2020    CODE STATUS  DNAR/DNI    HPI & HOSPITAL COURSE  This is a 89 y.o. male here with shortness of breath.  He has a history of COPD and heart failure with recent treatment of pneumonia.  Patient discharged to rehab and discharged from there home, patient weak and unable to get around much and with progressive SOB.  He has home oxygen and is minimally complaint with wearing it.   He has shown improvement with treatment of copd exacerbation, he should taper from steroids while at SNF.    Therefore, he is discharged in fair and stable condition to skilled nursing facility.    The patient met 2-midnight criteria for an inpatient stay at the time of discharge.    Discharge Date  2/26/2020    FOLLOW UP ITEMS POST DISCHARGE  PCP    DISCHARGE DIAGNOSES  Active Problems:    Chronic obstructive pulmonary disease with acute exacerbation (HCC) POA: Yes    Acute on chronic respiratory failure with hypoxemia (HCC) POA: Yes    Chronic diastolic (congestive) heart failure (HCC) POA: Unknown    DNR (do not resuscitate) POA: Unknown    Epistaxis POA: Unknown  Resolved Problems:    * No resolved hospital problems. *      FOLLOW UP  Future Appointments   Date Time Provider Department Center   2/27/2020  2:20 PM Devendra Mary M.D. [facility] None     Ihsan Monet M.D.  [address] [address]  [phone]            MEDICATIONS ON DISCHARGE     Medication List      START taking these medications      Instructions   Guaifenesin 1200 MG Tb12   Take 1 Tab by mouth every 12 hours.  Dose:  1,200 mg     ipratropium-albuterol 0.5-2.5 (3) MG/3ML nebulizer solution  Commonly known as:  DUONEB   3 mL by Nebulization route every four hours as needed for Shortness of Breath.  Dose:  3 mL     predniSONE 20 MG Tabs  Commonly known as:   DELTASONE   Take 3 Tabs by mouth every day.  Dose:  60 mg        CONTINUE taking these medications      Instructions   aspirin EC 81 MG Tbec  Commonly known as:  ECOTRIN   Take 81 mg by mouth 1 time daily as needed.  Dose:  81 mg     D3 ADULT PO   Take 1 Tab by mouth every day. Indications: supplement  Dose:  1 Tab     enalapril 5 MG Tabs  Commonly known as:  VASOTEC   Take 1 Tab by mouth every day.  Dose:  5 mg            Allergies  No Known Allergies    DIET  Orders Placed This Encounter   Procedures   • Diet Order Regular     Standing Status:   Standing     Number of Occurrences:   1     Order Specific Question:   Diet:     Answer:   Regular [1]     Order Specific Question:   Texture Modifier     Answer:   Level 6 - Soft & Bite Sized (Dysphagia 3)     Comments:   Patient has difficulty seeing and is unable to cut up food.     Order Specific Question:   Liquid level     Answer:   Level 0 - Thin       ACTIVITY  As tolerated.  Weight bearing as tolerated    CONSULTATIONS  none    PROCEDURES  none    LABORATORY  Lab Results   Component Value Date    SODIUM 133 (L) 02/26/2020    POTASSIUM 4.2 02/26/2020    CHLORIDE 94 (L) 02/26/2020    CO2 30 02/26/2020    GLUCOSE 162 (H) 02/26/2020    BUN 44 (H) 02/26/2020    CREATININE 1.02 02/26/2020    CREATININE 1.2 03/25/2008        Lab Results   Component Value Date    WBC 10.1 02/26/2020    HEMOGLOBIN 12.2 (L) 02/26/2020    HEMATOCRIT 38.1 (L) 02/26/2020    PLATELETCT 139 (L) 02/26/2020        Total time of the discharge process exceeds 35 minutes.

## 2020-02-26 NOTE — DISCHARGE PLANNING
ATTN: Case Management  RE: Referral for Home Health    Reason for referral denial: Patient going to SNF will wait for referral from facility              Unfortunately, we are not able to accept this referral for the reason listed above. If further clarity is needed, our Transitional Care Specialists are available to discuss any barriers to service at [phone].      We look forward to collaborating with you in the future,  [facility] Team

## 2020-02-26 NOTE — RESPIRATORY CARE
COPD EDUCATION by COPD CLINICAL EDUCATOR  2/26/2020 at 7:08 AM by Radha Tao RRT     Patient reviewed by COPD education team. Patient does not have a history or diagnosis of COPD and is a non-smoker, therefore does not qualify for the COPD program.

## 2020-02-26 NOTE — PROGRESS NOTES
Received report from NANO Perez. Patient is sleeping, Pt resting comfortably in bed,  Call light within reach and safety precautions in place.       08:20 Assess patient, plan of care discussed with patient, declines any needs at this time and denies pain. Helped patient with setting up breakfast. Bed is low, locked with alarm on, tread socks on, call light in reach

## 2020-02-26 NOTE — CARE PLAN
Problem: Communication  Goal: The ability to communicate needs accurately and effectively will improve  Outcome: PROGRESSING AS EXPECTED   Provided time for patient to ask questions about POC, Will continue to diuresis and explained what to expect. Patient verbalized understanding    Problem: Bowel/Gastric:  Goal: Normal bowel function is maintained or improved  Outcome: PROGRESSING AS EXPECTED   Hold stool softeners, encourage oral hydration and to ambulate

## 2020-02-26 NOTE — PROGRESS NOTES
Discharged order written, IV removed, tele removed and returned to the monitor tech. Patient discharged to [facility]. AVS printed and revieved copied signed and placed on chart, another copy given to patient, and one to [facility]. Discharged instructions provided to patient. Patient verbalizes understanding. Patent states all questions have been answered. Patient states that all personal belongings are in possesion.   Prescriptions sent to pharmacy, [facility] . Patient off unit by wheelchair, escorted by medical transport

## 2020-02-26 NOTE — DISCHARGE INSTRUCTIONS
Discharge Instructions    Discharged to home by medical transportation with self. Discharged via wheelchair, hospital escort: Yes.  Special equipment needed: Wheelchair    Be sure to schedule a follow-up appointment with your primary care doctor or any specialists as instructed.     Discharge Plan:   Influenza Vaccine Indication: Patient Refuses    I understand that a diet low in cholesterol, fat, and sodium is recommended for good health. Unless I have been given specific instructions below for another diet, I accept this instruction as my diet prescription.   Other diet: Regular    Special Instructions:     HF Patient Discharge Instructions  · Monitor your weight daily, and maintain a weight chart, to track your weight changes.   · Activity as tolerated, unless your Doctor has ordered otherwise. Other activity order: As tolerated .  · Follow a low fat, low cholesterol, low salt diet unless instructed otherwise by your Doctor. Read the labels on the back of food products and track your intake of fat, cholesterol and salt.   · Fluid Restriction No. If a Fluid Restriction has been ordered by your Doctor, measure fluids with a measuring cup to ensure that you are not exceeding the restriction.   · No smoking.  · Oxygen Yes. If your Doctor has ordered that you wear Oxygen at home, it is important to wear it as ordered.  · Did you receive an explanation from staff on the importance of taking each of your medications and why it is necessary to stay on the medications the physician/care provider has ordered? Yes  · Do you have any questions concerning how to manage your heart failure and what to do should you have any increased signs and symptoms after you go home? Yes  · Do you feel like your heart failure care team involved you in the care treatment plan and allowed you to make decisions regarding your care while in the hospital and addressed any discharge needs you might have? Yes    See the educational handout  provided at discharge for more information on monitoring your daily weight, activity and diet. This also explains more about Heart Failure, symptoms of a flare-up and some of the tests that you have undergone.     Warning Signs of a Flare-Up include:  · Swelling in the ankles or lower legs.  · Shortness of breath, while at rest, or while doing normal activities.   · Shortness of breath at night when in bed, or coughing in bed.   · Requiring more pillows to sleep at night, or needing to sit up at night to sleep.  · Feeling weak, dizzy or fatigued.     When to call your Doctor:  · Call [facility] about questions regarding the discharge instructions you were given [phone].  (Discharge Unit Med/Tele)  · Call your Primary Care Physician or Cardiologist if:   1. You experience any pain radiating to your jaw or neck.  2. You have any difficulty breathing.  3. You experience weight gain of 2 lbs in a day or 5 lbs in a week.   4. You feel any palpitations or irregular heartbeats.  5. You become dizzy or lose consciousness.   If you have had an angiogram or had a pacemaker or AICD placed, and experience:  1. Bleeding, drainage or swelling at the surgical / puncture site.  2. Fever greater than 100.0 F  3. Shock from internal defibrillator.  4. Cool and / or numb extremities.      · Is patient discharged on Warfarin / Coumadin?   No     Depression / Suicide Risk    As you are discharged from this [facility], it is important to learn how to keep safe from harming yourself.    Recognize the warning signs:  · Abrupt changes in personality, positive or negative- including increase in energy   · Giving away possessions  · Change in eating patterns- significant weight changes-  positive or negative  · Change in sleeping patterns- unable to sleep or sleeping all the time   · Unwillingness or inability to communicate  · Depression  · Unusual sadness, discouragement and loneliness  · Talk of  wanting to die  · Neglect of personal appearance   · Rebelliousness- reckless behavior  · Withdrawal from people/activities they love  · Confusion- inability to concentrate     If you or a loved one observes any of these behaviors or has concerns about self-harm, here's what you can do:  · Talk about it- your feelings and reasons for harming yourself  · Remove any means that you might use to hurt yourself (examples: pills, rope, extension cords, firearm)  · Get professional help from the community (Mental Health, Substance Abuse, psychological counseling)  · Do not be alone:Call your Safe Contact- someone whom you trust who will be there for you.  · Call your local CRISIS HOTLINE 779-5717 or 312-793-0669  · Call your local Children's Mobile Crisis Response Team [place] (718) 166-9577 or www.[employer]  · Call the toll free National Suicide Prevention Hotlines   · National Suicide Prevention Lifeline 742-322-CVYN (6573)  · National [employer] Line Network 800-SUICIDE (862-9698)                Pulmonary Edema  Pulmonary edema is abnormal fluid buildup in the lungs that can make it hard to breathe.  Follow these instructions at home:  · Talk to your doctor about an exercise program.  · Eat a healthy diet:  ¨ Eat fresh fruits, vegetables, and lean meats.  ¨ Limit high fat and salty foods.  ¨ Avoid processed, canned, or fried foods.  ¨ Avoid fast food.  · Follow your doctor's advice about taking medicine and recording the medicine you take.  · Follow your doctor's advice about keeping a record of your weight.  · Talk to your doctor about keeping track of your blood pressure.  · Do not smoke.  · Do not use nicotine patches or nicotine gum.  · Make a follow-up appointment with your doctor.  · Ask your doctor for a copy of your latest heart tracing (ECG) and keep a copy with you at all times.  Get help right away if:  · You have chest pain. THIS IS AN EMERGENCY. Do not wait to see if the pain will go away. Call for  local emergency medical help. Do not drive yourself to the hospital.  · You have sweating, feel sick to your stomach (nauseous), or are experiencing shortness of breath.  · Your weight increases more than your doctor tells you it should.  · You start to have shortness of breath.  · You notice more swelling in your hands, feet, ankles, or belly.  · You have dizziness, blurred vision, headache, or unsteadiness that does not go away.  · You cough up bloody spit.  · You have a cough that does not go away.  · You are unable to sleep because it is hard to breathe.  · You begin to feel a “jumping” or “fluttering” sensation (palpitations) in the chest that is unusual for you.  This information is not intended to replace advice given to you by your health care provider. Make sure you discuss any questions you have with your health care provider.  Document Released: 12/06/2010 Document Revised: 05/25/2017 Document Reviewed: 08/25/2014  [employer] Interactive Patient Education © 2017 [employer]        Chronic Obstructive Pulmonary Disease  Chronic obstructive pulmonary disease (COPD) is a common lung problem. In COPD, the flow of air from the lungs is limited. The way your lungs work will probably never return to normal, but there are things you can do to improve your lungs and make yourself feel better. Your doctor may treat your condition with:  · Medicines.  · Oxygen.  · Lung surgery.  · Changes to your diet.  · Rehabilitation. This may involve a team of specialists.  Follow these instructions at home:  · Take all medicines as told by your doctor.  · Avoid medicines or cough syrups that dry up your airway (such as antihistamines) and do not allow you to get rid of thick spit. You do not need to avoid them if told differently by your doctor.  · If you smoke, stop. Smoking makes the problem worse.  · Avoid being around things that make your breathing worse (like smoke, chemicals, and fumes).  · Use oxygen therapy and therapy  to help improve your lungs (pulmonary rehabilitation) if told by your doctor. If you need home oxygen therapy, ask your doctor if you should buy a tool to measure your oxygen level (oximeter).  · Avoid people who have a sickness you can catch (contagious).  · Avoid going outside when it is very hot, cold, or humid.  · Eat healthy foods. Eat smaller meals more often. Rest before meals.  · Stay active, but remember to also rest.  · Make sure to get all the shots (vaccines) your doctor recommends. Ask your doctor if you need a pneumonia shot.  · Learn and use tips on how to relax.  · Learn and use tips on how to control your breathing as told by your doctor. Try:  1. Breathing in (inhaling) through your nose for 1 second. Then, pucker your lips and breath out (exhale) through your lips for 2 seconds.  2. Putting one hand on your belly (abdomen). Breathe in slowly through your nose for 1 second. Your hand on your belly should move out. Pucker your lips and breathe out slowly through your lips. Your hand on your belly should move in as you breathe out.  · Learn and use controlled coughing to clear thick spit from your lungs. The steps are:  1. Lean your head a little forward.  2. Breathe in deeply.  3. Try to hold your breath for 3 seconds.  4. Keep your mouth slightly open while coughing 2 times.  5. Spit any thick spit out into a tissue.  6. Rest and do the steps again 1 or 2 times as needed.  Contact a doctor if:  · You cough up more thick spit than usual.  · There is a change in the color or thickness of the spit.  · It is harder to breathe than usual.  · Your breathing is faster than usual.  Get help right away if:  · You have shortness of breath while resting.  · You have shortness of breath that stops you from:  ¨ Being able to talk.  ¨ Doing normal activities.  · You chest hurts for longer than 5 minutes.  · Your skin color is more blue than usual.  · Your pulse oximeter shows that you have low oxygen for longer  than 5 minutes.  This information is not intended to replace advice given to you by your health care provider. Make sure you discuss any questions you have with your health care provider.  Document Released: 06/05/2009 Document Revised: 05/25/2017 Document Reviewed: 08/14/2014  [employer] Interactive Patient Education © 2017 [employer]      Guaifenesin; Hydrocodone Extended release capsules or tablets  What is this medicine?  GUAIFENESIN; HYDROCODONE (gwye FEN e sin; ebony droe KOE done) helps to temporarily stop or reduce a dry and nonproductive cough.  This medicine may be used for other purposes; ask your health care provider or pharmacist if you have questions.  COMMON BRAND NAME(S): Atuss HX, Extendryl HC, Tusso-HC, Xpect-HC  What should I tell my health care provider before I take this medicine?  They need to know if you have any of these conditions:  -asthma  -brain tumor  -chronic bronchitis  -diarrhea associated with pseudomembranous colitis from antibiotics  -emphysema  -head injury  -kidney disease  -liver disease  -an allergic reaction to hydrocodone, guaifenesin, other opioid analgesics, other medicines, foods, dyes, or preservatives  -pregnant or trying to get pregnant  -breast-feeding  How should I use this medicine?  Take this medicine by mouth with a full glass of water. Follow the directions on the prescription label. Do not cut, crush or chew this medicine. Take this medicine with food or milk if it upsets your stomach. Take your doses at regular times. Do not take your medicine more often than directed. If you are taking this medicine on a regular basis, do not suddenly stop taking it. Your doctor may want to slowly lower your dose.  Talk to your pediatrician regarding the use of this medicine in children. Special care may be needed.  Patients over 65 years old may have a stronger reaction and need a smaller dose.  Overdosage: If you think you have taken too much of this medicine contact a poison  control center or emergency room at once.  NOTE: This medicine is only for you. Do not share this medicine with others.  What if I miss a dose?  If you miss a dose, take it as soon as you can. If it is almost time for your next dose, take only that dose. Do not take double or extra doses.  What may interact with this medicine?  Do not take this medicine with any of the following medications:  -alcohol  -antihistamines for allergy, cough and cold  -certain medicines for anxiety or sleep  -certain medicines for depression like amitriptyline, fluoxetine, sertraline  -certain medicines for seizures like carbamazepine, phenobarbital, phenytoin, primidone  -general anesthetics like halothane, isoflurane, methoxyflurane, propofol  -local anesthetics like lidocaine, pramoxine, tetracaine  -MAOIs like Carbex, Eldepryl, Marplan, Nardil, and Parnate  -other narcotic medicines (opiates) for pain or cough  -phenothiazines like chlorpromazine, mesoridazine, prochlorperazine, thioridazine  This medicine may also interact with the following medications:  -antiviral medicines for HIV and AIDS  -atropine  -certain antibiotics like clarithromycin, erythromycin  -certain medicines for bladder problems like oxybutynin, tolterodine  -certain medicines for fungal infections like ketoconazole and itraconazole  -certain medicines for Parkinson's disease like benztropine, trihexyphenidyl  -certain medicines for stomach problems like dicyclomine, hyoscyamine  -certain medicines for travel sickness like scopolamine  -ipratropium  -rifampin  This list may not describe all possible interactions. Give your health care provider a list of all the medicines, herbs, non-prescription drugs, or dietary supplements you use. Also tell them if you smoke, drink alcohol, or use illegal drugs. Some items may interact with your medicine.  What should I watch for while using this medicine?  You may develop tolerance to this medicine if you take it for a long  time. Tolerance means that you will get less cough relief with time. Tell your doctor or health care professional if your symptoms do not improve or if they get worse. If you have a high fever, skin rash, or headache, see your health care professional.  Do not suddenly stop taking your medicine because you may develop a severe reaction. Your body becomes used to the medicine. This does NOT mean you are addicted. Addiction is a behavior related to getting and using a drug for a non-medical reason. If your doctor wants you to stop the medicine, the dose will be slowly lowered over time to avoid any side effects.  Drink several glasses of water each day.  You may get drowsy or dizzy. Do not drive, use machinery, or do anything that needs mental alertness until you know how this medicine affects you. Do not stand or sit up quickly, especially if you are an older patient. This reduces the risk of dizzy or fainting spells. Alcohol may interfere with the effect of this medicine. Avoid alcoholic drinks.  The medicine may cause constipation. Try to have a bowel movement at least every 2 to 3 days. If you do not have a bowel movement for 3 days, call your doctor or health care professional.  What side effects may I notice from receiving this medicine?  Side effects that you should report to your doctor or health care professional as soon as possible:  -allergic reactions like skin rash or hives, swelling of the face, lips, or tongue  -confusion  -difficulty breathing  -slow or fast heartbeat  Side effects that usually do not require medical attention (report to your doctor or health care professional if they continue or are bothersome):  -dizziness  -drowsiness  -nausea  This list may not describe all possible side effects. Call your doctor for medical advice about side effects. You may report side effects to FDA at 2-880-FDA-7206.  Where should I keep my medicine?  Keep out of the reach of children. This medicine can be  abused. Keep this medicine in a safe place to protect it from theft. Do not share this medicine with anyone. Selling or giving away this medicine is dangerous and is against the law.  This medicine may cause accidental overdose and death if taken by other adults, children, or pets. Mix any unused medicine with a substance like cat littler or coffee grounds. Then throw the medicine away in a sealed container like a sealed bag or a coffee can with a lid. Do not use the medicine after the expiration date.  Store at room temperature between 15 and 30 degrees C (59 and 86 degrees F). Protect from light.  NOTE: This sheet is a summary. It may not cover all possible information. If you have questions about this medicine, talk to your doctor, pharmacist, or health care provider.  © 2018 [employer] (2017-02-02 13:40:05)          Albuterol; Ipratropium solution for inhalation  What is this medicine?  ALBUTEROL; IPRATROPIUM (al BYOO ter ole; i pra TROE pee um) has two bronchodilators. It helps open up the airways in your lungs to make it easier to breathe. This medicine is used to treat chronic obstructive pulmonary disease (COPD).  This medicine may be used for other purposes; ask your health care provider or pharmacist if you have questions.  COMMON BRAND NAME(S): Nancy  What should I tell my health care provider before I take this medicine?  They need to know if you have any of the following conditions:  -heart disease  -high blood pressure  -irregular heartbeat  -an unusual or allergic reaction to albuterol, ipratropium, atropine, soya protein, soybeans or peanuts, other medicines, foods, dyes, or preservatives  -pregnant or trying to get pregnant  -breast-feeding  How should I use this medicine?  This medicine is used in a nebulizer. Nebulizers make a liquid into an aerosol that you breathe in through your mouth or your mouth and nose into your lungs. You will be taught how to use your nebulizer. Follow the  directions on your prescription label. Take your medicine at regular intervals. Do not use more often than directed.  Talk to your pediatrician regarding the use of this medicine in children. Special care may be needed.  Overdosage: If you think you have taken too much of this medicine contact a poison control center or emergency room at once.  NOTE: This medicine is only for you. Do not share this medicine with others.  What if I miss a dose?  If you miss a dose, use it as soon as you can. If it is almost time for your next dose, use only that dose. Do not use double or extra doses.  What may interact with this medicine?  Do not take this medicine with any of the following medications:  -MAOIs like Carbex, Eldepryl, Marplan, Nardil, and Parnate  This medicine may also interact with the following medications:  -diuretics  -medicines for depression, anxiety, or psychotic disturbances  -medicines for irregular heartbeat  -medicines for weight loss including some herbal products  -methadone  -pimozide  -some medicines for blood pressure or the heart  -sertindole  This list may not describe all possible interactions. Give your health care provider a list of all the medicines, herbs, non-prescription drugs, or dietary supplements you use. Also tell them if you smoke, drink alcohol, or use illegal drugs. Some items may interact with your medicine.  What should I watch for while using this medicine?  Tell your doctor or healthcare professional if your symptoms do not start to get better or if they get worse. If your breathing gets worse while you are using this medicine, call your doctor right away. Do not stop using your medicine unless your doctor tells you to.  Your mouth may get dry. Chewing sugarless gum or sucking hard candy, and drinking plenty of water may help. Contact your doctor if the problem does not go away or is severe.  You may get dizzy or have blurred vision. Do not drive, use machinery, or do anything  that needs mental alertness until you know how this medicine affects you. Do not stand or sit up quickly, especially if you are an older patient. This reduces the risk of dizzy or fainting spells.  What side effects may I notice from receiving this medicine?  Side effects that you should report to your doctor or health care professional as soon as possible:  -allergic reactions like skin rash, itching or hives, swelling of the face, lips, or tongue  -breathing problems  -feeling faint or lightheaded, falls  -fever  -high blood pressure  -irregular heartbeat or chest pain  -muscle cramps or weakness  -pain, tingling, numbness in the hands or feet  -vomiting  Side effects that usually do not require medical attention (report to your doctor or health care professional if they continue or are bothersome):  -blurred vision  -cough  -difficulty passing urine  -difficulty sleeping  -headache  -nervousness or trembling  -stuffy or runny nose  -unusual taste  -upset stomach  This list may not describe all possible side effects. Call your doctor for medical advice about side effects. You may report side effects to FDA at 0-694-FDA-9414.  Where should I keep my medicine?  Keep out of the reach of children.  Store at a room temperature 2 and 30 degrees C (36 to 86 degrees F). Protect from light. Store this medicine in the protective pouch until ready to use. Throw away any unused medicine after the expiration date.  NOTE: This sheet is a summary. It may not cover all possible information. If you have questions about this medicine, talk to your doctor, pharmacist, or health care provider.  © 2018 [employer] (2016-12-28 15:59:17)        Prednisone tablets  What is this medicine?  PREDNISONE (PRED ni sone) is a corticosteroid. It is commonly used to treat inflammation of the skin, joints, lungs, and other organs. Common conditions treated include asthma, allergies, and arthritis. It is also used for other conditions,  such as blood disorders and diseases of the adrenal glands.  This medicine may be used for other purposes; ask your health care provider or pharmacist if you have questions.  COMMON BRAND NAME(S): Deltasone, Predone, Sterapred, Sterapred DS  What should I tell my health care provider before I take this medicine?  They need to know if you have any of these conditions:  -Cushing's syndrome  -diabetes  -glaucoma  -heart disease  -high blood pressure  -infection (especially a virus infection such as chickenpox, cold sores, or herpes)  -kidney disease  -liver disease  -mental illness  -myasthenia gravis  -osteoporosis  -seizures  -stomach or intestine problems  -thyroid disease  -an unusual or allergic reaction to lactose, prednisone, other medicines, foods, dyes, or preservatives  -pregnant or trying to get pregnant  -breast-feeding  How should I use this medicine?  Take this medicine by mouth with a glass of water. Follow the directions on the prescription label. Take this medicine with food. If you are taking this medicine once a day, take it in the morning. Do not take more medicine than you are told to take. Do not suddenly stop taking your medicine because you may develop a severe reaction. Your doctor will tell you how much medicine to take. If your doctor wants you to stop the medicine, the dose may be slowly lowered over time to avoid any side effects.  Talk to your pediatrician regarding the use of this medicine in children. Special care may be needed.  Overdosage: If you think you have taken too much of this medicine contact a poison control center or emergency room at once.  NOTE: This medicine is only for you. Do not share this medicine with others.  What if I miss a dose?  If you miss a dose, take it as soon as you can. If it is almost time for your next dose, talk to your doctor or health care professional. You may need to miss a dose or take an extra dose. Do not take double or extra doses without  advice.  What may interact with this medicine?  Do not take this medicine with any of the following medications:  -metyrapone  -mifepristone  This medicine may also interact with the following medications:  -aminoglutethimide  -amphotericin B  -aspirin and aspirin-like medicines  -barbiturates  -certain medicines for diabetes, like glipizide or glyburide  -cholestyramine  -cholinesterase inhibitors  -cyclosporine  -digoxin  -diuretics  -ephedrine  -female hormones, like estrogens and birth control pills  -isoniazid  -ketoconazole  -NSAIDS, medicines for pain and inflammation, like ibuprofen or naproxen  -phenytoin  -rifampin  -toxoids  -vaccines  -warfarin  This list may not describe all possible interactions. Give your health care provider a list of all the medicines, herbs, non-prescription drugs, or dietary supplements you use. Also tell them if you smoke, drink alcohol, or use illegal drugs. Some items may interact with your medicine.  What should I watch for while using this medicine?  Visit your doctor or health care professional for regular checks on your progress. If you are taking this medicine over a prolonged period, carry an identification card with your name and address, the type and dose of your medicine, and your doctor's name and address.  This medicine may increase your risk of getting an infection. Tell your doctor or health care professional if you are around anyone with measles or chickenpox, or if you develop sores or blisters that do not heal properly.  If you are going to have surgery, tell your doctor or health care professional that you have taken this medicine within the last twelve months.  Ask your doctor or health care professional about your diet. You may need to lower the amount of salt you eat.  This medicine may affect blood sugar levels. If you have diabetes, check with your doctor or health care professional before you change your diet or the dose of your diabetic medicine.  What  side effects may I notice from receiving this medicine?  Side effects that you should report to your doctor or health care professional as soon as possible:  -allergic reactions like skin rash, itching or hives, swelling of the face, lips, or tongue  -changes in emotions or moods  -changes in vision  -depressed mood  -eye pain  -fever or chills, cough, sore throat, pain or difficulty passing urine  -increased thirst  -swelling of ankles, feet  Side effects that usually do not require medical attention (report to your doctor or health care professional if they continue or are bothersome):  -confusion, excitement, restlessness  -headache  -nausea, vomiting  -skin problems, acne, thin and shiny skin  -trouble sleeping  -weight gain  This list may not describe all possible side effects. Call your doctor for medical advice about side effects. You may report side effects to FDA at 0-873-FDA-7760.  Where should I keep my medicine?  Keep out of the reach of children.  Store at room temperature between 15 and 30 degrees C (59 and 86 degrees F). Protect from light. Keep container tightly closed. Throw away any unused medicine after the expiration date.  NOTE: This sheet is a summary. It may not cover all possible information. If you have questions about this medicine, talk to your doctor, pharmacist, or health care provider.  © 2018 [employer] (2012-08-02 10:57:14)

## 2020-02-26 NOTE — PROGRESS NOTES
Received report from Sophia RN, pt resting in bed at this time. Pt stating no further needs. Bed alarm on.

## 2020-02-27 NOTE — PROGRESS NOTES
Telemetry Shift Summary    Rhythm:  SR -ST  Ectopy:o PVC, o PAC  HR Range:   Measurements: .16/.08/.38          Normal Values  Rhythm SR  HR Range   Measurements 0.12 / 0.20 / 0.06-0.10 / 0.30-0.52

## 2020-03-30 ENCOUNTER — HOME CARE VISIT (OUTPATIENT)
Dept: HOME HEALTH SERVICES | Facility: HOME HEALTHCARE | Age: 85
End: 2020-03-30
Payer: MEDICARE

## 2021-01-14 DIAGNOSIS — Z23 NEED FOR VACCINATION: ICD-10-CM

## 2024-04-19 NOTE — PROGRESS NOTES
1024 Patient arrived from cath lab to PPU. Connected to monitor and report received from anesthesia and RN. VSS. 6L 02 via mask. No airway in place. Breaths calm, even, unlabored.     1025: Pt on room air, no pain or nausea    1033: EKG completed at bedside    1034:Updated pt's spouse via phone    1110: Discharge instructions provided to pt and pt's spouse at bedside.l     1120: Pt meets discharge criteria. PIV removed intact. Pt escorted out with all personal belongings via wheelchair.                 Pt arrived to unit via gurney. Ambulated from gurney to bed, with 1-2 person assist. Tele monitor applied, vitals taken. Pt assessed. A&O x4. Admit profile and med rec complete. Discussed POC with pt, including stress test. Welcome folder provided and discussed. Communication board filled out. Questions and concerns addressed, verbalized understanding. Fall precautions in place. Pt demonstrates ability to use call light appropriately. Pt left in lowest position. Bed locked and low, bed alarm on.